# Patient Record
Sex: FEMALE | Race: WHITE | NOT HISPANIC OR LATINO | Employment: OTHER | URBAN - METROPOLITAN AREA
[De-identification: names, ages, dates, MRNs, and addresses within clinical notes are randomized per-mention and may not be internally consistent; named-entity substitution may affect disease eponyms.]

---

## 2019-08-27 ENCOUNTER — TELEPHONE (OUTPATIENT)
Dept: VASCULAR SURGERY | Facility: CLINIC | Age: 69
End: 2019-08-27

## 2019-08-27 NOTE — TELEPHONE ENCOUNTER
Would be happy to see patient again  Unfortunately, without any clinical history or exam, I cannot know what workup she needs without seeing her first   Please schedule her for appt at her convenience and we can try to expedite her workup if need be      Thanks,    Alicia Moss

## 2019-08-27 NOTE — TELEPHONE ENCOUNTER
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  Patient has since relocated to [place] [id number] and was seeing vascular surgeon there who informed her she is going to need some "work" done on the area and possibly "cleaned out"  Even though patient would technically be a new patient now, she is requesting Dr Alex Kilgore look over her chart and advise what should be ordered out in [place] so that she can come here and Dr Alex Kilgore address her problem  Advised her she needs to have all records with vascular surgeon in [place] forwarded to office and to schedule appt here  Please advise if there is anything you suggest the patient have done (imaging) prior to her visit with you

## 2019-09-20 ENCOUNTER — TELEPHONE (OUTPATIENT)
Dept: VASCULAR SURGERY | Facility: CLINIC | Age: 69
End: 2019-09-20

## 2019-09-20 NOTE — TELEPHONE ENCOUNTER
Patient called to request Dr Lou Souza to order bilateral knee Xrays when she has her doppler done  She said she had a bad fall in the shower and she wants her joint replacements to be checked  I told her that it should be done by ortho, but she insisted on me asking dr Lou Souza because she is in [place] presently and she is coming to see dr Luo Souza and have doppler done and wants knees checked at the same time  I told her I would send a message to dr Lou Souza

## 2019-09-24 NOTE — TELEPHONE ENCOUNTER
No, I will not order xrays to evaluate knee replacements as I would not be qualified to evaluate the xrays once complete  Additionally, these could not be done at the same time as the doppler as they are two different imaging modalities  Please tell patient that she should mention any concerns either to ortho or her PCP and if she is still having pain, she should get this evaluated in [place] so as not to delay treatment      Thanks

## 2019-09-26 ENCOUNTER — OFFICE VISIT (OUTPATIENT)
Dept: VASCULAR SURGERY | Facility: CLINIC | Age: 69
End: 2019-09-26
Payer: COMMERCIAL

## 2019-09-26 ENCOUNTER — HOSPITAL ENCOUNTER (OUTPATIENT)
Dept: NON INVASIVE DIAGNOSTICS | Facility: CLINIC | Age: 69
Discharge: HOME | End: 2019-09-26
Payer: COMMERCIAL

## 2019-09-26 VITALS
HEIGHT: 61 IN | HEART RATE: 83 BPM | SYSTOLIC BLOOD PRESSURE: 138 MMHG | BODY MASS INDEX: 40.17 KG/M2 | TEMPERATURE: 98.7 F | DIASTOLIC BLOOD PRESSURE: 90 MMHG | WEIGHT: 212.8 LBS

## 2019-09-26 DIAGNOSIS — I73.9 PAD (PERIPHERAL ARTERY DISEASE) (HCC): Primary | ICD-10-CM

## 2019-09-26 DIAGNOSIS — I72.9 ANEURYSM (HCC): ICD-10-CM

## 2019-09-26 PROBLEM — M47.812 DJD (DEGENERATIVE JOINT DISEASE) OF CERVICAL SPINE: Status: ACTIVE | Noted: 2019-09-26

## 2019-09-26 PROCEDURE — 93926 LOWER EXTREMITY STUDY: CPT | Performed by: SURGERY

## 2019-09-26 PROCEDURE — 99214 OFFICE O/P EST MOD 30 MIN: CPT | Performed by: SURGERY

## 2019-09-26 PROCEDURE — 93922 UPR/L XTREMITY ART 2 LEVELS: CPT | Performed by: SURGERY

## 2019-09-26 PROCEDURE — 93926 LOWER EXTREMITY STUDY: CPT

## 2019-09-26 RX ORDER — ASPIRIN 81 MG/1
81 TABLET ORAL DAILY
COMMUNITY

## 2019-09-26 RX ORDER — ESOMEPRAZOLE MAGNESIUM 40 MG/1
CAPSULE, DELAYED RELEASE ORAL
COMMUNITY

## 2019-09-26 RX ORDER — DULOXETIN HYDROCHLORIDE 30 MG/1
CAPSULE, DELAYED RELEASE ORAL
COMMUNITY

## 2019-09-26 NOTE — PATIENT INSTRUCTIONS
1) Right popliteal stent  -the ultrasound showed that there is a narrowing at the end of the stent  -I think we should do an angiogram to try to enlarge this area and prevent further blockage  -we can do this now or next time you are back in the area  -avoid kneeling for any period of time  -continue your aspirin once daily

## 2019-09-26 NOTE — PROGRESS NOTES
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  She is s/p viabahn stent placement  (11/15)    PAD (peripheral artery disease) ([facility])  -     VAS lower limb arterial duplex, complete bilateral; Future  -s/p R viabahn stent placement for bleeding pseudoaneurysm as complication of R knee replacement  -patient has since moved to [place] to be close to her sons  -reports that she had a procedure there where it sound like the did a RLE agram and "cleaned things out put want to do the same thing to clean it out more"  -reviewed LEADs which report >75% stenosis at the distal stent but ABIs remain wnl  -patient continues to be asymptomatic  -discussed in stent stenosis and risk of stentgraft thrombosis; recommended angiogram to evaluate and treat any stenosis; as patient is only visiting, she does not want to plan procedure now and would like to have this done after the winter  -she will call our office once she knows the schedule for her next visit; we will plan to do a LEADs and then angiogram; instructed her that she should attempt to fly home for a least a week after the procedure, if all goes well and longer if any complications    Medications  -cont ASA and statin    F/U: 6 mos for RLE angiogram    Subjective:      Patient ID: Matt Sosa is a 76 y o  female  Pt is a new patient referred by Dr Nadeen Vizcaino  Pt c/o of Heaviness of the R leg  Pt C/o of cramping in legs and thighs, pt states they are worst in the [place]   Pt is taking potassium and magnesium to help  This has been going on for 2 1/2 years  Pt had R leg agram in [place] about two years ago  Pt [place] TEDS in [place]   Pt has a VAS CASH 09/26/19  HPI:    Patient known to me from '13 when I placed a viabahn stent for bleeding PSA at [facility] after injury from TKA  She returns for first f/u in 2 years    She has since move to [place] to be closer to her twin boys and grandchildren  She reports having a procedure there to work on the right leg where he "opened things up" and that he said he wants to repeat the procedure to "open it up some more"  She denies symptoms including claudication, rest pain, wounds  The following portions of the patient's history were reviewed and updated as appropriate: allergies, current medications, past family history, past medical history, past social history, past surgical history and problem list     Review of Systems   Constitutional: Negative  HENT: Negative  Eyes: Negative  Respiratory: Positive for shortness of breath (with activity)  Cardiovascular: Positive for leg swelling  Gastrointestinal: Negative  Endocrine: Positive for heat intolerance  Genitourinary: Negative  Musculoskeletal: Positive for back pain, gait problem and myalgias (thigh spasms B)  Leg Heaviness B   Skin: Positive for pallor  Allergic/Immunologic: Negative  Neurological: Positive for numbness (R leg (since knee surgery))  Hematological: Negative  Psychiatric/Behavioral: Negative  Objective:      /90 (BP Location: Left arm, Patient Position: Sitting)   Pulse 83   Temp 98 7 °F (37 1 °C)   Ht 5' 1" (1 549 m)   Wt 96 5 kg (212 lb 12 8 oz)   BMI 40 21 kg/m²          Physical Exam   Constitutional: She is oriented to person, place, and time  She appears well-developed and well-nourished  HENT:   Head: Normocephalic and atraumatic  Eyes: Conjunctivae are normal    Neck: Normal range of motion  Neck supple  Cardiovascular: Normal rate, regular rhythm and normal heart sounds  No murmur heard  Pulses:       Radial pulses are 2+ on the right side, and 2+ on the left side  Dorsalis pedis pulses are 2+ on the right side, and 2+ on the left side  Posterior tibial pulses are 2+ on the right side, and 1+ on the left side     No carotid bruits   Pulmonary/Chest: Effort normal and breath sounds normal  No respiratory distress  She has no wheezes  Abdominal: Soft  She exhibits no distension  There is no tenderness  There is no rebound  Musculoskeletal: Normal range of motion  She exhibits edema (mild to moderate leg edema)  Neurological: She is alert and oriented to person, place, and time  Skin: Skin is warm and dry  Psychiatric: She has a normal mood and affect  Her behavior is normal    Nursing note and vitals reviewed  I have reviewed and made appropriate changes to the review of systems input by the medical assistant      Vitals:    09/26/19 1512   BP: 138/90   BP Location: Left arm   Patient Position: Sitting   Pulse: 83   Temp: 98 7 °F (37 1 °C)   Weight: 96 5 kg (212 lb 12 8 oz)   Height: 5' 1" (1 549 m)       Patient Active Problem List   Diagnosis    DJD (degenerative joint disease) of cervical spine    Arthritis    Bilateral edema of lower extremity    [facility]ch[facility] obstructive [facility]) [facility])    PAD (peripheral artery disease) ([facility])       Past Surgical History:   Procedure Laterality Date    LAPAROSCOPIC GASTRIC BANDING      POPLITEAL ARTERY STENT      REPLACEMENT TOTAL KNEE         Family History   Problem Relation Age of Onset    Cancer Mother        Social History     Socioeconomic History    Marital status: /Civil Union     Spouse name: Not on file    Number of children: Not on file    Years of education: Not on file    Highest education level: Not on file   Occupational History    Not on file   Social Needs    Financial resource strain: Not on file    Food insecurity:     Worry: Not on file     Inability: Not on file    Transportation needs:     Medical: Not on file     Non-medical: Not on file   Tobacco Use    Smoking status: Never Smoker    Smokeless tobacco: Never Used   Substance and Sexual Activity    Alcohol use: Never     Frequency: Never    Drug use: Never    Sexual activity: Not on file   Lifestyle    Physical activity: Days per week: Not on file     Minutes per session: Not on file    Stress: Not on file   Relationships    Social connections:     Talks on phone: Not on file     Gets together: Not on file     Attends [employer] service: Not on file     Active member of club or organization: Not on file     Attends meetings of clubs or organizations: Not on file     Relationship status: Not on file    Intimate partner violence:     Fear of current or ex partner: Not on file     Emotionally abused: Not on file     Physically abused: Not on file     Forced sexual activity: Not on file   Other Topics Concern    Not on file   Social History Narrative    Not on file       Allergies   Allergen Reactions    Erythromycin Nausea Only     Nausea      Levofloxacin          Current Outpatient Medications:     aspirin (ECOTRIN LOW STRENGTH) 81 mg EC tablet, Take 81 mg by mouth daily, Disp: , Rfl:     Aspirin-Acetaminophen-Caffeine (EXCEDRIN PO), Take by mouth, Disp: , Rfl:     diazePAM (VALIUM PO), Take by mouth, Disp: , Rfl:     MAGNESIUM PO, Take by mouth, Disp: , Rfl:     POTASSIUM PO, Take by mouth, Disp: , Rfl:     DULoxetine (CYMBALTA) 30 mg delayed release capsule, Take by mouth, Disp: , Rfl:     esomeprazole (NEXIUM) 40 MG capsule, Take by mouth, Disp: , Rfl:

## 2020-05-03 ENCOUNTER — OFFICE VISIT (OUTPATIENT)
Dept: URGENT CARE | Facility: CLINIC | Age: 70
End: 2020-05-03
Payer: COMMERCIAL

## 2020-05-03 VITALS
OXYGEN SATURATION: 95 % | SYSTOLIC BLOOD PRESSURE: 124 MMHG | RESPIRATION RATE: 20 BRPM | WEIGHT: 197 LBS | TEMPERATURE: 98.5 F | HEIGHT: 61 IN | DIASTOLIC BLOOD PRESSURE: 86 MMHG | HEART RATE: 82 BPM | BODY MASS INDEX: 37.19 KG/M2

## 2020-05-03 DIAGNOSIS — J11.1 INFLUENZA: Primary | ICD-10-CM

## 2020-05-03 PROCEDURE — 99213 OFFICE O/P EST LOW 20 MIN: CPT | Performed by: PHYSICIAN ASSISTANT

## 2020-05-03 PROCEDURE — S9083 URGENT CARE CENTER GLOBAL: HCPCS | Performed by: PHYSICIAN ASSISTANT

## 2020-05-03 RX ORDER — OSELTAMIVIR PHOSPHATE 75 MG/1
75 CAPSULE ORAL EVERY 12 HOURS SCHEDULED
Qty: 10 CAPSULE | Refills: 0 | Status: SHIPPED | OUTPATIENT
Start: 2020-05-03 | End: 2020-05-08

## 2020-05-03 RX ORDER — VALSARTAN AND HYDROCHLOROTHIAZIDE 80; 12.5 MG/1; MG/1
1 TABLET, FILM COATED ORAL
COMMUNITY
Start: 2019-04-25 | End: 2021-10-07

## 2020-05-03 RX ORDER — ALPRAZOLAM 0.5 MG/1
TABLET ORAL
COMMUNITY
Start: 2019-04-02 | End: 2022-08-09

## 2020-05-03 RX ORDER — IPRATROPIUM BROMIDE 42 UG/1
2 SPRAY, METERED NASAL 4 TIMES DAILY
COMMUNITY
Start: 2019-11-15 | End: 2020-05-03 | Stop reason: SDUPTHER

## 2020-05-03 RX ORDER — IPRATROPIUM BROMIDE 42 UG/1
2 SPRAY, METERED NASAL 4 TIMES DAILY
Qty: 15 ML | Refills: 0 | Status: SHIPPED | OUTPATIENT
Start: 2020-05-03

## 2020-06-11 ENCOUNTER — HOSPITAL ENCOUNTER (OUTPATIENT)
Dept: NON INVASIVE DIAGNOSTICS | Facility: CLINIC | Age: 70
Discharge: HOME | End: 2020-06-11
Payer: COMMERCIAL

## 2020-06-11 DIAGNOSIS — I73.9 PAD (PERIPHERAL ARTERY DISEASE) (HCC): ICD-10-CM

## 2020-06-11 PROCEDURE — 93925 LOWER EXTREMITY STUDY: CPT

## 2020-06-11 PROCEDURE — 93923 UPR/LXTR ART STDY 3+ LVLS: CPT

## 2020-06-12 ENCOUNTER — TELEPHONE (OUTPATIENT)
Dept: ADMINISTRATIVE | Facility: HOSPITAL | Age: 70
End: 2020-06-12

## 2020-06-12 PROCEDURE — 93925 LOWER EXTREMITY STUDY: CPT | Performed by: SURGERY

## 2020-06-12 PROCEDURE — 93922 UPR/L XTREMITY ART 2 LEVELS: CPT | Performed by: SURGERY

## 2020-06-15 ENCOUNTER — OFFICE VISIT (OUTPATIENT)
Dept: VASCULAR SURGERY | Facility: CLINIC | Age: 70
End: 2020-06-15
Payer: COMMERCIAL

## 2020-06-15 VITALS
TEMPERATURE: 99.1 F | SYSTOLIC BLOOD PRESSURE: 126 MMHG | WEIGHT: 199 LBS | RESPIRATION RATE: 18 BRPM | HEART RATE: 107 BPM | BODY MASS INDEX: 37.57 KG/M2 | DIASTOLIC BLOOD PRESSURE: 84 MMHG | HEIGHT: 61 IN

## 2020-06-15 DIAGNOSIS — I73.9 PAD (PERIPHERAL ARTERY DISEASE) (HCC): Primary | ICD-10-CM

## 2020-06-15 DIAGNOSIS — R60.0 BILATERAL EDEMA OF LOWER EXTREMITY: ICD-10-CM

## 2020-06-15 PROCEDURE — 99213 OFFICE O/P EST LOW 20 MIN: CPT | Performed by: SURGERY

## 2020-06-25 ENCOUNTER — APPOINTMENT (OUTPATIENT)
Dept: LAB | Facility: CLINIC | Age: 70
End: 2020-06-25
Payer: COMMERCIAL

## 2020-06-25 ENCOUNTER — TRANSCRIBE ORDERS (OUTPATIENT)
Dept: LAB | Facility: CLINIC | Age: 70
End: 2020-06-25

## 2020-06-25 DIAGNOSIS — F41.9 ANXIETY AND DEPRESSION: ICD-10-CM

## 2020-06-25 DIAGNOSIS — F32.A ANXIETY AND DEPRESSION: Primary | ICD-10-CM

## 2020-06-25 DIAGNOSIS — M54.42 BILATERAL LOW BACK PAIN WITH BILATERAL SCIATICA, UNSPECIFIED CHRONICITY: ICD-10-CM

## 2020-06-25 DIAGNOSIS — M54.41 BILATERAL LOW BACK PAIN WITH BILATERAL SCIATICA, UNSPECIFIED CHRONICITY: ICD-10-CM

## 2020-06-25 DIAGNOSIS — I10 HYPERTENSION, UNSPECIFIED TYPE: ICD-10-CM

## 2020-06-25 DIAGNOSIS — F32.A ANXIETY AND DEPRESSION: ICD-10-CM

## 2020-06-25 DIAGNOSIS — F41.9 ANXIETY AND DEPRESSION: Primary | ICD-10-CM

## 2020-06-25 LAB
25(OH)D3 SERPL-MCNC: 30.4 NG/ML (ref 30–100)
ALBUMIN SERPL BCP-MCNC: 3.8 G/DL (ref 3.5–5)
ALP SERPL-CCNC: 82 U/L (ref 46–116)
ALT SERPL W P-5'-P-CCNC: 21 U/L (ref 12–78)
ANION GAP SERPL CALCULATED.3IONS-SCNC: 6 MMOL/L (ref 4–13)
AST SERPL W P-5'-P-CCNC: 19 U/L (ref 5–45)
BASOPHILS # BLD AUTO: 0.05 THOUSANDS/ΜL (ref 0–0.1)
BASOPHILS NFR BLD AUTO: 1 % (ref 0–1)
BILIRUB SERPL-MCNC: 0.66 MG/DL (ref 0.2–1)
BUN SERPL-MCNC: 18 MG/DL (ref 5–25)
CALCIUM SERPL-MCNC: 9.5 MG/DL (ref 8.3–10.1)
CHLORIDE SERPL-SCNC: 104 MMOL/L (ref 100–108)
CHOLEST SERPL-MCNC: 294 MG/DL (ref 50–200)
CO2 SERPL-SCNC: 30 MMOL/L (ref 21–32)
CREAT SERPL-MCNC: 0.8 MG/DL (ref 0.6–1.3)
EOSINOPHIL # BLD AUTO: 0.08 THOUSAND/ΜL (ref 0–0.61)
EOSINOPHIL NFR BLD AUTO: 1 % (ref 0–6)
ERYTHROCYTE [DISTWIDTH] IN BLOOD BY AUTOMATED COUNT: 14.1 % (ref 11.6–15.1)
ERYTHROCYTE [SEDIMENTATION RATE] IN BLOOD: 35 MM/HOUR (ref 0–20)
GFR SERPL CREATININE-BSD FRML MDRD: 75 ML/MIN/1.73SQ M
GLUCOSE P FAST SERPL-MCNC: 98 MG/DL (ref 65–99)
HCT VFR BLD AUTO: 40.4 % (ref 34.8–46.1)
HDLC SERPL-MCNC: 94 MG/DL
HGB BLD-MCNC: 12.8 G/DL (ref 11.5–15.4)
IMM GRANULOCYTES # BLD AUTO: 0.02 THOUSAND/UL (ref 0–0.2)
IMM GRANULOCYTES NFR BLD AUTO: 0 % (ref 0–2)
LDLC SERPL CALC-MCNC: 178 MG/DL (ref 0–100)
LYMPHOCYTES # BLD AUTO: 0.98 THOUSANDS/ΜL (ref 0.6–4.47)
LYMPHOCYTES NFR BLD AUTO: 13 % (ref 14–44)
MAGNESIUM SERPL-MCNC: 1.9 MG/DL (ref 1.6–2.6)
MCH RBC QN AUTO: 28.9 PG (ref 26.8–34.3)
MCHC RBC AUTO-ENTMCNC: 31.7 G/DL (ref 31.4–37.4)
MCV RBC AUTO: 91 FL (ref 82–98)
MONOCYTES # BLD AUTO: 0.35 THOUSAND/ΜL (ref 0.17–1.22)
MONOCYTES NFR BLD AUTO: 5 % (ref 4–12)
NEUTROPHILS # BLD AUTO: 5.84 THOUSANDS/ΜL (ref 1.85–7.62)
NEUTS SEG NFR BLD AUTO: 80 % (ref 43–75)
NONHDLC SERPL-MCNC: 200 MG/DL
NRBC BLD AUTO-RTO: 0 /100 WBCS
NT-PROBNP SERPL-MCNC: 63 PG/ML
PLATELET # BLD AUTO: 328 THOUSANDS/UL (ref 149–390)
PMV BLD AUTO: 8.6 FL (ref 8.9–12.7)
POTASSIUM SERPL-SCNC: 3.7 MMOL/L (ref 3.5–5.3)
PROT SERPL-MCNC: 7.6 G/DL (ref 6.4–8.2)
RBC # BLD AUTO: 4.43 MILLION/UL (ref 3.81–5.12)
SODIUM SERPL-SCNC: 140 MMOL/L (ref 136–145)
TRIGL SERPL-MCNC: 108 MG/DL
TSH SERPL DL<=0.05 MIU/L-ACNC: 0.98 UIU/ML (ref 0.36–3.74)
VIT B12 SERPL-MCNC: 500 PG/ML (ref 100–900)
WBC # BLD AUTO: 7.32 THOUSAND/UL (ref 4.31–10.16)

## 2020-06-25 PROCEDURE — 83880 ASSAY OF NATRIURETIC PEPTIDE: CPT

## 2020-06-25 PROCEDURE — 80061 LIPID PANEL: CPT

## 2020-06-25 PROCEDURE — 83735 ASSAY OF MAGNESIUM: CPT

## 2020-06-25 PROCEDURE — 85025 COMPLETE CBC W/AUTO DIFF WBC: CPT

## 2020-06-25 PROCEDURE — 84443 ASSAY THYROID STIM HORMONE: CPT

## 2020-06-25 PROCEDURE — 82607 VITAMIN B-12: CPT

## 2020-06-25 PROCEDURE — 82306 VITAMIN D 25 HYDROXY: CPT

## 2020-06-25 PROCEDURE — 36415 COLL VENOUS BLD VENIPUNCTURE: CPT

## 2020-06-25 PROCEDURE — 80053 COMPREHEN METABOLIC PANEL: CPT

## 2020-06-25 PROCEDURE — 85652 RBC SED RATE AUTOMATED: CPT

## 2021-02-13 DIAGNOSIS — Z23 ENCOUNTER FOR IMMUNIZATION: ICD-10-CM

## 2021-03-22 ENCOUNTER — OFFICE VISIT (OUTPATIENT)
Dept: URGENT CARE | Facility: CLINIC | Age: 71
End: 2021-03-22
Payer: COMMERCIAL

## 2021-03-22 VITALS
SYSTOLIC BLOOD PRESSURE: 130 MMHG | TEMPERATURE: 97.4 F | OXYGEN SATURATION: 97 % | HEIGHT: 61 IN | WEIGHT: 220 LBS | BODY MASS INDEX: 41.54 KG/M2 | RESPIRATION RATE: 16 BRPM | HEART RATE: 80 BPM | DIASTOLIC BLOOD PRESSURE: 92 MMHG

## 2021-03-22 DIAGNOSIS — N30.01 ACUTE CYSTITIS WITH HEMATURIA: Primary | ICD-10-CM

## 2021-03-22 LAB
SL AMB  POCT GLUCOSE, UA: ABNORMAL
SL AMB LEUKOCYTE ESTERASE,UA: ABNORMAL
SL AMB POCT BILIRUBIN,UA: ABNORMAL
SL AMB POCT BLOOD,UA: ABNORMAL
SL AMB POCT CLARITY,UA: ABNORMAL
SL AMB POCT COLOR,UA: ABNORMAL
SL AMB POCT KETONES,UA: ABNORMAL
SL AMB POCT NITRITE,UA: ABNORMAL
SL AMB POCT PH,UA: 6
SL AMB POCT SPECIFIC GRAVITY,UA: 1.02
SL AMB POCT URINE PROTEIN: 30
SL AMB POCT UROBILINOGEN: 0.2

## 2021-03-22 PROCEDURE — 87086 URINE CULTURE/COLONY COUNT: CPT | Performed by: PHYSICIAN ASSISTANT

## 2021-03-22 PROCEDURE — 99213 OFFICE O/P EST LOW 20 MIN: CPT | Performed by: PHYSICIAN ASSISTANT

## 2021-03-22 PROCEDURE — 87186 SC STD MICRODIL/AGAR DIL: CPT | Performed by: PHYSICIAN ASSISTANT

## 2021-03-22 PROCEDURE — 81002 URINALYSIS NONAUTO W/O SCOPE: CPT | Performed by: PHYSICIAN ASSISTANT

## 2021-03-22 PROCEDURE — 87077 CULTURE AEROBIC IDENTIFY: CPT | Performed by: PHYSICIAN ASSISTANT

## 2021-03-22 PROCEDURE — S9083 URGENT CARE CENTER GLOBAL: HCPCS | Performed by: PHYSICIAN ASSISTANT

## 2021-03-22 RX ORDER — NITROFURANTOIN 25; 75 MG/1; MG/1
100 CAPSULE ORAL 2 TIMES DAILY
Qty: 14 CAPSULE | Refills: 1 | Status: SHIPPED | OUTPATIENT
Start: 2021-03-22 | End: 2021-03-29

## 2021-03-22 NOTE — PATIENT INSTRUCTIONS
Urine dip in the office today was positive for signs of UTI  Begin taking  Antibiotic as directed  Take with probiotic or yogurt to prevent stomach upset  Continue to stay very well hydrated  Follow up with PCP in 3-5 days for continued symptoms  Proceed to the ER with any worsening of symptoms, flank pain, decreased urination, fever or chills  Dysuria   WHAT YOU NEED TO KNOW:   Dysuria is difficulty urinating, or pain, burning, or discomfort with urination  Dysuria is usually a symptom of another problem  DISCHARGE INSTRUCTIONS:   Return to the emergency department if:   · You have severe back, side, or abdominal pain  · You have fever and shaking chills  · You vomit several times in a row  Contact your healthcare provider if:   · Your symptoms do not go away, even after treatment  · You have questions or concerns about your condition or care  Medicines:   · Medicines  may be given to help treat a bacterial infection or help decrease bladder spasms  · Take your medicine as directed  Contact your healthcare provider if you think your medicine is not helping or if you have side effects  Tell him of her if you are allergic to any medicine  Keep a list of the medicines, vitamins, and herbs you take  Include the amounts, and when and why you take them  Bring the list or the pill bottles to follow-up visits  Carry your medicine list with you in case of an emergency  Follow up with your healthcare provider as directed: Your healthcare provider may also refer you to a urologist or nephrologist to have additional testing  Write down your questions so you remember to ask them during your visits  Manage your dysuria:   · Drink more liquids  Liquids help flush out bacteria that may be causing an infection  Ask your healthcare provider how much liquid to drink each day and which liquids are best for you  · Take sitz baths as directed  Fill a bathtub with 4 to 6 inches of warm water  You may also use a sitz bath pan that fits over a toilet  Sit in the sitz bath for 20 minutes  Do this 2 to 3 times a day, or as directed  The warm water can help decrease pain and swelling  © Copyright [facility] [address] Information is for End User's use only and may not be sold, redistributed or otherwise used for commercial purposes  All illustrations and images included in CareNotes® are the copyrighted property of [employer]  or [facility] Naima Sung   The above information is an  only  It is not intended as medical advice for individual conditions or treatments  Talk to your doctor, nurse or pharmacist before following any medical regimen to see if it is safe and effective for you

## 2021-03-22 NOTE — PROGRESS NOTES
[facility]        NAME: Leah Bejarano is a 79 y o  female  : 1950    MRN: [id number]  DATE: 2021  TIME: 10:31 AM    Assessment and Plan   Acute cystitis with hematuria [N30 01]  1  Acute cystitis with hematuria  POCT urine dip    Urine culture    nitrofurantoin (MACROBID) 100 mg capsule         Patient Instructions     Urine dip in the office today was positive for signs of UTI  Begin taking  Antibiotic as directed  Take with probiotic or yogurt to prevent stomach upset  Continue to stay very well hydrated  Follow up with PCP in 3-5 days for continued symptoms  Proceed to the ER with any worsening of symptoms, flank pain, decreased urination, fever or chills  Follow up with PCP in 3-5 days  Proceed to  ER if symptoms worsen  Chief Complaint     Chief Complaint   Patient presents with    Possible UTI     About one week ago patient started with blood in urine  She the complains of burning while urinating  She tried Azo but it did not help  History of Present Illness       79year old female presents to the office for c/o of hematuria x 1 episode 4 days ago as well as dysuria for the last few days  The patient notes that she is prone to UTIs and that this feels similar  She wears a pad and noted a small amount of blood 4 days ago  No episodes since  She has had burning with urination on every urination for the last several days  She has been taking Azo OTC without much relief  She has also tried to stay hydrated  She dneies any fever, chills, flank pain  No changes in BM, blood in stool  She is eating and drinking normally  He last UTI was in the fall of last year  Urinary Tract Infection   This is a new problem  The current episode started in the past 7 days  The problem occurs every urination  The problem has been unchanged  The quality of the pain is described as burning  There has been no fever   Associated symptoms include frequency, hematuria, hesitancy and urgency  Pertinent negatives include no chills, flank pain, nausea or vomiting  She has tried increased fluids (AZO) for the symptoms  The treatment provided no relief  Her past medical history is significant for recurrent UTIs  There is no history of a single kidney or a urological procedure  Review of Systems   Review of Systems   Constitutional: Negative for appetite change, chills, fatigue and fever  Gastrointestinal: Negative for nausea and vomiting  Genitourinary: Positive for difficulty urinating, dysuria, frequency, hematuria, hesitancy and urgency  Negative for decreased urine volume and flank pain  Neurological: Negative for dizziness and headaches           Current Medications       Current Outpatient Medications:     ALPRAZolam (XANAX) 0 5 mg tablet, TAKE 1 TABLET BY MOUTH EVERY DAY AS NEEDED FOR SEVERE ANXIETY, Disp: , Rfl:     aspirin (ECOTRIN LOW STRENGTH) 81 mg EC tablet, Take 81 mg by mouth daily, Disp: , Rfl:     Aspirin-Acetaminophen-Caffeine (EXCEDRIN PO), Take by mouth, Disp: , Rfl:     diazePAM (VALIUM PO), Take by mouth, Disp: , Rfl:     DULoxetine (CYMBALTA) 30 mg delayed release capsule, Take by mouth, Disp: , Rfl:     esomeprazole (NEXIUM) 40 MG capsule, Take by mouth, Disp: , Rfl:     ipratropium (ATROVENT) 0 06 % nasal spray, 2 sprays into each nostril 4 (four) times a day, Disp: 15 mL, Rfl: 0    MAGNESIUM PO, Take by mouth, Disp: , Rfl:     POTASSIUM PO, Take by mouth, Disp: , Rfl:     valsartan-hydrochlorothiazide (DIOVAN-HCT) 80-12 5 MG per tablet, 1 tablet, Disp: , Rfl:     nitrofurantoin (MACROBID) 100 mg capsule, Take 1 capsule (100 mg total) by mouth 2 (two) times a day for 7 days, Disp: 14 capsule, Rfl: 1    Current Allergies     Allergies as of 03/22/2021 - Reviewed 03/22/2021   Allergen Reaction Noted    Erythromycin Nausea Only 06/16/2017    Levofloxacin  01/25/2016    Codeine Other (See Comments) 04/19/2018            The following portions of the patient's history were reviewed and updated as appropriate: allergies, current medications, past family history, past medical history, past social history, past surgical history and problem list      Past Medical History:   Diagnosis Date    High blood pressure        Past Surgical History:   Procedure Laterality Date    GASTROSTOMY      LAPAROSCOPIC GASTRIC BANDING      POPLITEAL ARTERY STENT      REPLACEMENT TOTAL KNEE      TONSILLECTOMY         Family History   Problem Relation Age of Onset    Cancer Mother          Medications have been verified  Objective   /92   Pulse 80   Temp (!) 97 4 °F (36 3 °C)   Resp 16   Ht 5' 1" (1 549 m)   Wt 99 8 kg (220 lb)   SpO2 97%   BMI 41 57 kg/m²   No LMP recorded  Physical Exam     Physical Exam  Vitals signs and nursing note reviewed  Constitutional:       General: She is not in acute distress  Appearance: Normal appearance  She is well-developed  She is not diaphoretic  Comments: Pleasant female in no acute distress    HENT:      Head: Normocephalic and atraumatic  Right Ear: Hearing, tympanic membrane, ear canal and external ear normal       Left Ear: Hearing, tympanic membrane, ear canal and external ear normal       Nose: No mucosal edema or rhinorrhea  Mouth/Throat:      Lips: Pink  Mouth: Mucous membranes are moist       Dentition: No dental caries  Pharynx: Uvula midline  No oropharyngeal exudate, posterior oropharyngeal erythema or uvula swelling  Tonsils: No tonsillar exudate or tonsillar abscesses  Comments:  Mucous membranes moist  Eyes:      General: Lids are normal  No scleral icterus  Right eye: No discharge  Left eye: No discharge  Conjunctiva/sclera: Conjunctivae normal       Pupils: Pupils are equal, round, and reactive to light  Cardiovascular:      Rate and Rhythm: Normal rate and regular rhythm        Heart sounds: Normal heart sounds, S1 normal and S2 normal  No murmur  No S3 or S4 sounds  Pulmonary:      Effort: Pulmonary effort is normal  No respiratory distress  Breath sounds: Normal breath sounds  No stridor  No wheezing, rhonchi or rales  Abdominal:      General: Bowel sounds are normal  There is no distension  Palpations: Abdomen is soft  Abdomen is not rigid  Tenderness: There is no abdominal tenderness  There is no right CVA tenderness, left CVA tenderness or guarding  Lymphadenopathy:      Cervical: No cervical adenopathy  Skin:     General: Skin is warm and dry  Coloration: Skin is not pale  Findings: No rash  Neurological:      Mental Status: She is alert  Psychiatric:         Behavior: Behavior is cooperative

## 2021-03-25 LAB — BACTERIA UR CULT: ABNORMAL

## 2021-04-27 ENCOUNTER — HOSPITAL ENCOUNTER (OUTPATIENT)
Dept: NON INVASIVE DIAGNOSTICS | Facility: CLINIC | Age: 71
Discharge: HOME | End: 2021-04-27
Payer: COMMERCIAL

## 2021-04-27 DIAGNOSIS — I73.9 PAD (PERIPHERAL ARTERY DISEASE) (HCC): ICD-10-CM

## 2021-04-27 PROCEDURE — 93925 LOWER EXTREMITY STUDY: CPT

## 2021-04-27 PROCEDURE — 93925 LOWER EXTREMITY STUDY: CPT | Performed by: SURGERY

## 2021-04-27 PROCEDURE — 93923 UPR/LXTR ART STDY 3+ LVLS: CPT

## 2021-04-27 PROCEDURE — 93922 UPR/L XTREMITY ART 2 LEVELS: CPT | Performed by: SURGERY

## 2021-05-07 ENCOUNTER — OFFICE VISIT (OUTPATIENT)
Dept: VASCULAR SURGERY | Facility: CLINIC | Age: 71
End: 2021-05-07
Payer: COMMERCIAL

## 2021-05-07 VITALS
WEIGHT: 220 LBS | TEMPERATURE: 98.8 F | HEART RATE: 86 BPM | HEIGHT: 61 IN | SYSTOLIC BLOOD PRESSURE: 130 MMHG | DIASTOLIC BLOOD PRESSURE: 86 MMHG | BODY MASS INDEX: 41.54 KG/M2

## 2021-05-07 DIAGNOSIS — I73.9 PAD (PERIPHERAL ARTERY DISEASE) (HCC): Primary | ICD-10-CM

## 2021-05-07 DIAGNOSIS — R60.0 BILATERAL EDEMA OF LOWER EXTREMITY: ICD-10-CM

## 2021-05-07 PROCEDURE — 99214 OFFICE O/P EST MOD 30 MIN: CPT | Performed by: PHYSICIAN ASSISTANT

## 2021-05-07 RX ORDER — VALSARTAN 80 MG/1
80 TABLET ORAL DAILY
COMMUNITY
Start: 2020-09-23 | End: 2021-09-23

## 2021-05-07 RX ORDER — ALBUTEROL SULFATE 90 UG/1
AEROSOL, METERED RESPIRATORY (INHALATION)
COMMUNITY
Start: 2020-12-11 | End: 2022-08-09

## 2021-05-07 NOTE — PROGRESS NOTES
Assessment/Plan:    PAD (peripheral artery disease) ([address] )  79year old female nonsmoker w/ COPD, OA, DJD, venous insufficiency, hx of bleeding PSA after R knee replacement s/p viabahn stent placement by Dr Antwan Alfaro Doctor in 2015, presenting for review of recent CASH    CASH 4/27/21  -Rt: known >50% stenosis of the distal popliteal stent, ELBA 1 11/144/102  -Lt: No evidence of arterial occlusive disease  ELBA 1 24/119/109    -Patient presents for follow up after previous viabahn stenting for bleeding PSA following R knee replacement in 2015  -CASH reviewed with patient  There is a known >50% stenosis of the distal R pop stent  This has remained stable and unchanged  -Patient remains asymptomatic  She denies claudication, rest pain or wounds  Palpable pedal pulses on exam  -No surgical/endovascular intervention warranted at this time  -Recommend continuation of ASA  -Will continue routine surveillance with CASH in one year with follow up at that time  -Advised patient to call the office with new symptoms consistent with claudication or rest pain  Patient vocalized understanding     Bilateral edema of lower extremity  -Bilateral lower extremity edema likely secondary to venous insuffiencey   -Patient notices increased swelling in the lower extremities at night  -Recommend compression stockings, lower extremity elevation, exercise and weight loss  -Prescription given for compression stockings       Diagnoses and all orders for this visit:    PAD (peripheral artery disease) ([facility])  -     VAS lower limb arterial duplex, complete bilateral; Future  -     Compression Stocking    Bilateral edema of lower extremity    Other orders  -     albuterol (PROVENTIL HFA,VENTOLIN HFA) 90 mcg/act inhaler; USE 2 INHALATIONS BY MOUTH  SPACED 60 SECONDS APART,  EVERY 4 TO 6 HOURS  -     valsartan (DIOVAN) 80 mg tablet; Take 80 mg by mouth daily          Subjective:      Patient ID: Chema Mishra is a 79 y o  female      Patient is here to rev CASH done on 4/27/21  Patient is having R leg swelling  Patient is taking ASA 81mg  79year old female with hx of R knee replacement complicated by bleeding PSA s/p repair presents for regular follow up and review of routine CASH for surveillance  Patient has overall been doing well  Her imaging notes patent R popliteal viabahn stent with 50% stenosis distally  This has been present on previous imaging  Patient denies claudication, rest pain or wounds  She is able to ambulate without difficulty  She notes some bilateral lower extremity edema which is relieved with compression stockings  The following portions of the patient's history were reviewed and updated as appropriate: allergies, current medications, past family history, past medical history, past social history, past surgical history and problem list     Review of Systems   Constitutional: Negative  HENT: Negative  Eyes: Negative  Respiratory: Negative  Cardiovascular: Positive for leg swelling (RLE)  Gastrointestinal: Negative  Endocrine: Negative  Genitourinary: Negative  Musculoskeletal: Negative  Skin: Negative  Allergic/Immunologic: Negative  Neurological: Negative  Hematological: Negative  Psychiatric/Behavioral: Negative  I have reviewed and made appropriate changes to the review of systems input by the medical assistant      Vitals:    05/07/21 1308   BP: 130/86   BP Location: Right arm   Patient Position: Sitting   Cuff Size: Standard   Pulse: 86   Temp: 98 8 °F (37 1 °C)   TempSrc: Tympanic   Weight: 99 8 kg (220 lb)   Height: 5' 1" (1 549 m)       Patient Active Problem List   Diagnosis    DJD (degenerative joint disease) of cervical spine    Arthritis    Bilateral edema of lower extremity    COPD (chronic obstructive pulmonary disease) ([facility]    PAD (peripheral artery disease) ([facility])       Past Surgical History:   Procedure Laterality Date    GASTROSTOMY      LAPAROSCOPIC GASTRIC BANDING  POPLITEAL ARTERY STENT      REPLACEMENT TOTAL KNEE      TONSILLECTOMY         Family History   Problem Relation Age of Onset    Cancer Mother        Social History     Socioeconomic History    Marital status: /Civil Union     Spouse name: Not on file    Number of children: Not on file    Years of education: Not on file    Highest education level: Not on file   Occupational History    Not on file   Social Needs    Financial resource strain: Not on file    Food insecurity     Worry: Not on file     Inability: Not on file   [employer] needs     Medical: Not on file     Non-medical: Not on file   Tobacco Use    Smoking status: Never Smoker    Smokeless tobacco: Never Used   Substance and Sexual Activity    Alcohol use: Never     Frequency: Never    Drug use: Never    Sexual activity: Not on file   Lifestyle    Physical activity     Days per week: Not on file     Minutes per session: Not on file    Stress: Not on file   Relationships    Social connections     Talks on phone: Not on file     Gets together: Not on file     Attends Rastafarian service: Not on file     Active member of club or organization: Not on file     Attends meetings of clubs or organizations: Not on file     Relationship status: Not on file    Intimate partner violence     Fear of current or ex partner: Not on file     Emotionally abused: Not on file     Physically abused: Not on file     Forced sexual activity: Not on file   Other Topics Concern    Not on file   Social History Narrative    Not on file       Allergies   Allergen Reactions    Erythromycin Nausea Only     Nausea      Levofloxacin     Codeine Other (See Comments)     RINGING IN EARS AND DIFFICULTY SLEEPING         Current Outpatient Medications:     albuterol (PROVENTIL HFA,VENTOLIN HFA) 90 mcg/act inhaler, USE 2 INHALATIONS BY MOUTH  SPACED 60 SECONDS APART,  EVERY 4 TO 6 HOURS, Disp: , Rfl:     ALPRAZolam (XANAX) 0 5 mg tablet, TAKE 1 TABLET BY MOUTH EVERY DAY AS NEEDED FOR SEVERE ANXIETY, Disp: , Rfl:     aspirin (ECOTRIN LOW STRENGTH) 81 mg EC tablet, Take 81 mg by mouth daily, Disp: , Rfl:     Aspirin-Acetaminophen-Caffeine (EXCEDRIN PO), Take by mouth, Disp: , Rfl:     diazePAM (VALIUM PO), Take by mouth, Disp: , Rfl:     DULoxetine (CYMBALTA) 30 mg delayed release capsule, Take by mouth, Disp: , Rfl:     esomeprazole (NEXIUM) 40 MG capsule, Take by mouth, Disp: , Rfl:     MAGNESIUM PO, Take by mouth, Disp: , Rfl:     POTASSIUM PO, Take by mouth, Disp: , Rfl:     valsartan (DIOVAN) 80 mg tablet, Take 80 mg by mouth daily, Disp: , Rfl:     valsartan-hydrochlorothiazide (DIOVAN-HCT) 80-12 5 MG per tablet, 1 tablet, Disp: , Rfl:     ipratropium (ATROVENT) 0 06 % nasal spray, 2 sprays into each nostril 4 (four) times a day (Patient not taking: Reported on 5/7/2021), Disp: 15 mL, Rfl: 0    Objective:      /86 (BP Location: Right arm, Patient Position: Sitting, Cuff Size: Standard)   Pulse 86   Temp 98 8 °F (37 1 °C) (Tympanic)   Ht 5' 1" (1 549 m)   Wt 99 8 kg (220 lb)   BMI 41 57 kg/m²          Physical Exam  Constitutional:       General: She is not in acute distress  Appearance: Normal appearance  She is not ill-appearing, toxic-appearing or diaphoretic  HENT:      Head: Normocephalic and atraumatic  Right Ear: External ear normal       Left Ear: External ear normal       Nose: Nose normal       Mouth/Throat:      Mouth: Mucous membranes are moist       Pharynx: Oropharynx is clear  Eyes:      General: No scleral icterus  Extraocular Movements: Extraocular movements intact  Conjunctiva/sclera: Conjunctivae normal    Neck:      Musculoskeletal: Normal range of motion and neck supple  Cardiovascular:      Rate and Rhythm: Normal rate and regular rhythm  Pulses:           Radial pulses are 2+ on the right side and 2+ on the left side          Dorsalis pedis pulses are 2+ on the right side and 2+ on the left side       Heart sounds: Normal heart sounds  Pulmonary:      Effort: Pulmonary effort is normal  No respiratory distress  Breath sounds: Normal breath sounds  Abdominal:      General: Abdomen is flat  Palpations: Abdomen is soft  Tenderness: There is no abdominal tenderness  Musculoskeletal: Normal range of motion  Skin:     General: Skin is warm and dry  Neurological:      General: No focal deficit present  Mental Status: She is alert and oriented to person, place, and time  Mental status is at baseline  Cranial Nerves: No cranial nerve deficit  Sensory: No sensory deficit  Motor: No weakness     Psychiatric:         Mood and Affect: Mood normal          Behavior: Behavior normal

## 2021-05-07 NOTE — ASSESSMENT & PLAN NOTE
-Bilateral lower extremity edema likely secondary to venous insuffiencey   -Patient notices increased swelling in the lower extremities at night  -Recommend compression stockings, lower extremity elevation, exercise and weight loss  -Prescription given for compression stockings

## 2021-05-07 NOTE — ASSESSMENT & PLAN NOTE
79year old female nonsmoker w/ COPD, OA, DJD, venous insufficiency, hx of bleeding PSA after R knee replacement s/p viabahn stent placement by Dr Danisha Dela Cruz Doctor in 2015, presenting for review of recent CASH    CASH 4/27/21  -Rt: known >50% stenosis of the distal popliteal stent, ELBA 1 11/144/102  -Lt: No evidence of arterial occlusive disease  ELBA 1 24/119/109    -Patient presents for follow up after previous viabahn stenting for bleeding PSA following R knee replacement in 2015  -CASH reviewed with patient  There is a known >50% stenosis of the distal R pop stent  This has remained stable and unchanged  -Patient remains asymptomatic  She denies claudication, rest pain or wounds  Palpable pedal pulses on exam  -No surgical/endovascular intervention warranted at this time  -Recommend continuation of ASA  -Will continue routine surveillance with CASH in one year with follow up at that time  -Advised patient to call the office with new symptoms consistent with claudication or rest pain   Patient vocalized understanding

## 2021-05-07 NOTE — PATIENT INSTRUCTIONS
Venous Insufficiency   AMBULATORY CARE:   Venous insufficiency  is a condition that prevents blood from flowing out of your legs and back to your heart  Veins contain valves that help blood flow in one direction  Venous insufficiency means the valves do not close correctly or fully  Blood flows back and pools in your leg  This can cause problems such as varicose veins  Venous insufficiency may also be called chronic venous insufficiency or venous stasis  Common signs and symptoms:   · Visible veins on your legs that may be small and red or large, thick, and blue    · Swelling in your ankles or calves    · Changes in skin color, such as dark or purple skin    · An ulcer (open sore) on your leg    · Leg pain that is worse when you are menstruating (women) or when you stand, and better when you elevate your legs    · Burning or itching    · Cramps that happen at night    · Thick, hard skin on your legs and ankles    · Feeling of heaviness in your legs    Seek care immediately if:   · You have a wound that does not heal or is infected  · You have an injury that has broken your skin and caused your varicose veins to bleed  · Your leg is swollen and hard  · You have pain in your leg that does not go away or gets worse  · Your legs or feet are turning blue or black  · Your leg feels warm, tender, and painful  It may look swollen and red  Contact your healthcare provider if:   · You have a fever  · You have varicose veins and they are painful  · You have new or worsening leg pain, swelling, or redness  · You have new or worsening ulcers or other sores on your leg  · You have questions or concerns about your condition or care  Treatment  may include any of the following:  · Medicine  may be given to improve blood flow  The medicines may thin your blood or reduce swelling to help blood flow  You may also need medicine to treat a bacterial infection      · Ablation  is a procedure used to close varicose veins  A catheter is guided until it is near the vein  A device will then be guided to the area  The device may produce energy through radiofrequency or a laser  The energy creates heat that will close the blood vessel  · Sclerotherapy  is a procedure used to fade visible veins  Your healthcare provider will inject a liquid into a spider vein or varicose vein  The liquid causes irritation in the vein  The vein swells and sticks together  Your body will then absorb the vein  · Surgery  may be needed if other treatments do not work  Surgery may be used to repair a leg vein valve or to clip or tie off a vein so blood cannot flow through it  You may need to have a veins removed during surgery called stripping  Surgery may be used to bypass (go around) the damaged vein  Blood will flow through a vein transplanted from another part of your body  Manage your symptoms:   · Wear pressure stockings as directed  Pressure stockings help keep blood from pooling in your leg veins  Your healthcare provider can prescribe stockings that are right for you  Do not buy over-the-counter pressure stockings unless your healthcare provider says it is okay  They may not fit correctly or may have elastic that cuts off your circulation  Ask your healthcare provider when to start wearing pressure stockings and how long to wear them each day  · Do not sit or stand for long periods of time  If you have to sit for a long time, flex and extend your legs, feet, and ankles  Do this about 10 times every 30 minutes to help keep blood flowing  If you have to stand for a long time, take breaks and sit with your legs elevated  · Elevate your legs  Elevate your legs above the level of your heart to reduce swelling  Your healthcare provider may recommend that you keep your legs elevated for 30 minutes at a time  You may need to do this 3 to 4 times per day, or more if your healthcare provider recommends  · Do not smoke  Nicotine and other chemicals in cigarettes and cigars can cause blood vessel damage  Ask your healthcare provider for information if you currently smoke and need help to quit  E-cigarettes or smokeless tobacco still contain nicotine  Talk to your healthcare provider before you use these products  · Reach or maintain a healthy weight  Extra weight can make venous insufficiency worse  Ask your healthcare provider how much you should weigh  He can help you create a weight loss plan if you need to lose weight  · Exercise as directed  Walking can help increase blood flow in your calves  Ask your healthcare provider how much exercise you need each day and which exercises are best for you  · Care for your skin  Keep your skin clean  Do not use any soaps or lotions that may dry your skin  For example, do not use products that contain fragrance or alcohol  If you have a skin ulcer, your healthcare provider may recommend a wet-to-dry bandage  To do this, apply a wet bandage to your wound and allow it to dry  This will help remove drainage from your wound each time you change the bandage  Your healthcare provider will tell you how often to change your bandage and which kind of bandage to use  Check your wound for signs of infection, such as swelling or pus  · Go to physical therapy (PT) as directed  A physical therapist can help you increase movement and range of motion in your legs  Follow up with your healthcare provider as directed:  Write down your questions so you remember to ask them during your visits  © Copyright [address] Information is for End User's use only and may not be sold, redistributed or otherwise used for commercial purposes  All illustrations and images included in CareNotes® are the copyrighted property of [employer]  or [facility] Naima Sung   The above information is an  only  It is not intended as medical advice for individual conditions or treatments   Talk to your doctor, nurse or pharmacist before following any medical regimen to see if it is safe and effective for you  Peripheral Artery Disease   WHAT YOU NEED TO KNOW:   What is peripheral artery disease? Peripheral artery disease (PAD) is narrow, weak, or blocked arteries  It may affect any arteries outside of your heart and brain  PAD is usually the result of a buildup of fat and cholesterol, also called plaque, along your artery walls  Inflammation, a blood clot, or abnormal cell growth could also block your arteries  PAD prevents normal blood flow to your legs and arms  You are at risk of an amputation if poor blood flow keeps wounds from healing or causes gangrene (tissue death)  Without treatment, PAD can also cause a heart attack or stroke  What increases my risk for PAD? · Smoking cigarettes     · Diabetes     · High blood pressure     · High cholesterol     · Age older than 40 years    · Heart disease or a family history of heart disease    What are the signs and symptoms of PAD? Mild PAD usually does not cause symptoms  As the disease worsens over time, you may have the following:  · Pain or cramps in your leg or hip while you walk     · A numb, weak, or heavy feeling in your legs     · Dry, scaly, red, or pale skin on your legs     · Thick or brittle nails, or hair loss on your arms and legs     · Foot sores that will not heal     · Burning or aching in your feet and toes while resting (this may be worse when you lie down)    How is PAD diagnosed? · Angiography  is a test that shows pictures of the arteries in your arms and legs  You will be given contrast liquid to help the arteries show up better on the pictures  The pictures will be taken with an MRI or CT scan  Tell the healthcare provider if you have ever had an allergic reaction to contrast liquid  Do not enter the MRI room with anything metal  Metal can cause serious injury   Tell a healthcare provider if you have any metal in or on your body     · Doppler ankle brachial index (ELBA)  is a test that compares blood pressure in your ankles to blood pressure in your arms  This tells your healthcare provider how well blood is flowing through the arteries in your legs  How is PAD treated? Treatment can help reduce your risk of a heart attack, stroke, or amputation  You may need more than one of the following:  · Medicines  may be given  Your healthcare provider may give you any of the following:     ? Antiplatelet medicine,  such as aspirin, helps prevent blood clots and reduces the risk of a heart attack or stroke  ? Statin medicine  helps lower your cholesterol and prevents your PAD from getting worse  · A supervised exercise program  helps you stay active in normal daily activities and may prevent disability  Healthcare providers will help you safely walk or do strength training exercises 3 times a week for 30 to 60 minutes  You will do this for several months, then transition to walking on your own  · Angioplasty  is a procedure to open your artery so blood can flow through normally  A thin tube called a catheter is used to insert a small balloon into your artery  The balloon is inflated to open your blocked artery, and then removed  A tube called a stent may be placed in your artery to hold it open  · Bypass surgery  is used to make a new connection to your artery with a vein from another part of your body, or an artificial graft  The vein or graft is attached to your artery above and below your blockage  This allows blood to flow around the blocked portion of your artery  How can I manage PAD? · Do not smoke  Nicotine and other chemicals in cigarettes and cigars can worsen PAD  They can also increase your risk for a heart attack or stroke  Ask your healthcare provider for information if you currently smoke and need help to quit  E-cigarettes or smokeless tobacco still contain nicotine   Talk to your healthcare provider before you use these products  · Manage other health conditions  Take your medicines as directed  Follow your healthcare provider's instructions if you have high blood pressure or high cholesterol  Perform foot care and check your blood sugar levels as directed if you have diabetes  · Eat heart healthy foods  Eat whole grains, fruits, and vegetables every day  Limit salt and high-fat foods  Ask your healthcare provider for more information on a heart healthy diet  Ask if you need to lose weight  Your healthcare provider can help you create a healthy weight-loss plan  Call 911 for the following:   · You have any of the following signs of a heart attack:      ? Squeezing, pressure, or pain in your chest    ? You may  also have any of the following:     ? Discomfort or pain in your back, neck, jaw, stomach, or arm    ? Shortness of breath    ? Nausea or vomiting    ? Lightheadedness or a sudden cold sweat    · You have any of the following signs of a stroke:      ? Numbness or drooping on one side of your face     ? Weakness in an arm or leg    ? Confusion or difficulty speaking    ? Dizziness, a severe headache, or vision loss    When should I seek immediate care? · You have sores or wounds that will not heal      · You notice black or discolored skin on your arm or leg  · Your skin is cool to the touch  When should I contact my healthcare provider? · You have leg pain when you walk 1/8 mile (200 meters) or less, even with treatment  · Your legs are red, dry, or pale, even with treatment  · You have questions or concerns about your condition or care  CARE AGREEMENT:   You have the right to help plan your care  Learn about your health condition and how it may be treated  Discuss treatment options with your healthcare providers to decide what care you want to receive  You always have the right to refuse treatment  The above information is an  only   It is not intended as medical advice for individual conditions or treatments  Talk to your doctor, nurse or pharmacist before following any medical regimen to see if it is safe and effective for you  © Copyright [address] Information is for End User's use only and may not be sold, redistributed or otherwise used for commercial purposes   All illustrations and images included in CareNotes® are the copyrighted property of [employer]  or [address] [place] [address]

## 2021-09-10 ENCOUNTER — APPOINTMENT (OUTPATIENT)
Dept: LAB | Facility: CLINIC | Age: 71
End: 2021-09-10
Payer: COMMERCIAL

## 2021-09-10 DIAGNOSIS — J44.9 OBSTRUCTIVE CHRONIC BRONCHITIS WITHOUT EXACERBATION (HCC): ICD-10-CM

## 2021-09-10 DIAGNOSIS — I10 HYPERTENSION, UNSPECIFIED TYPE: ICD-10-CM

## 2021-09-10 DIAGNOSIS — R06.02 SHORTNESS OF BREATH: ICD-10-CM

## 2021-09-10 DIAGNOSIS — R05.9 COUGH: ICD-10-CM

## 2021-09-10 LAB
25(OH)D3 SERPL-MCNC: 27.1 NG/ML (ref 30–100)
ALBUMIN SERPL BCP-MCNC: 3.9 G/DL (ref 3.5–5)
ALP SERPL-CCNC: 94 U/L (ref 46–116)
ALT SERPL W P-5'-P-CCNC: 27 U/L (ref 12–78)
ANION GAP SERPL CALCULATED.3IONS-SCNC: 8 MMOL/L (ref 4–13)
AST SERPL W P-5'-P-CCNC: 20 U/L (ref 5–45)
BASOPHILS # BLD AUTO: 0.06 THOUSANDS/ΜL (ref 0–0.1)
BASOPHILS NFR BLD AUTO: 1 % (ref 0–1)
BILIRUB SERPL-MCNC: 0.51 MG/DL (ref 0.2–1)
BUN SERPL-MCNC: 15 MG/DL (ref 5–25)
CALCIUM SERPL-MCNC: 9.5 MG/DL (ref 8.3–10.1)
CHLORIDE SERPL-SCNC: 103 MMOL/L (ref 100–108)
CHOLEST SERPL-MCNC: 327 MG/DL (ref 50–200)
CO2 SERPL-SCNC: 30 MMOL/L (ref 21–32)
CREAT SERPL-MCNC: 0.69 MG/DL (ref 0.6–1.3)
EOSINOPHIL # BLD AUTO: 0.17 THOUSAND/ΜL (ref 0–0.61)
EOSINOPHIL NFR BLD AUTO: 3 % (ref 0–6)
ERYTHROCYTE [DISTWIDTH] IN BLOOD BY AUTOMATED COUNT: 14.6 % (ref 11.6–15.1)
ERYTHROCYTE [SEDIMENTATION RATE] IN BLOOD: 51 MM/HOUR (ref 0–29)
GFR SERPL CREATININE-BSD FRML MDRD: 88 ML/MIN/1.73SQ M
GLUCOSE P FAST SERPL-MCNC: 95 MG/DL (ref 65–99)
HCT VFR BLD AUTO: 42.3 % (ref 34.8–46.1)
HDLC SERPL-MCNC: 93 MG/DL
HGB BLD-MCNC: 13 G/DL (ref 11.5–15.4)
IMM GRANULOCYTES # BLD AUTO: 0.02 THOUSAND/UL (ref 0–0.2)
IMM GRANULOCYTES NFR BLD AUTO: 0 % (ref 0–2)
IRON SERPL-MCNC: 64 UG/DL (ref 50–170)
LDLC SERPL CALC-MCNC: 212 MG/DL (ref 0–100)
LYMPHOCYTES # BLD AUTO: 1.82 THOUSANDS/ΜL (ref 0.6–4.47)
LYMPHOCYTES NFR BLD AUTO: 29 % (ref 14–44)
MAGNESIUM SERPL-MCNC: 1.9 MG/DL (ref 1.6–2.6)
MCH RBC QN AUTO: 28.1 PG (ref 26.8–34.3)
MCHC RBC AUTO-ENTMCNC: 30.7 G/DL (ref 31.4–37.4)
MCV RBC AUTO: 92 FL (ref 82–98)
MONOCYTES # BLD AUTO: 0.44 THOUSAND/ΜL (ref 0.17–1.22)
MONOCYTES NFR BLD AUTO: 7 % (ref 4–12)
NEUTROPHILS # BLD AUTO: 3.86 THOUSANDS/ΜL (ref 1.85–7.62)
NEUTS SEG NFR BLD AUTO: 60 % (ref 43–75)
NONHDLC SERPL-MCNC: 234 MG/DL
NRBC BLD AUTO-RTO: 0 /100 WBCS
PLATELET # BLD AUTO: 402 THOUSANDS/UL (ref 149–390)
PMV BLD AUTO: 9.1 FL (ref 8.9–12.7)
POTASSIUM SERPL-SCNC: 4.3 MMOL/L (ref 3.5–5.3)
PROT SERPL-MCNC: 7.4 G/DL (ref 6.4–8.2)
RBC # BLD AUTO: 4.62 MILLION/UL (ref 3.81–5.12)
SODIUM SERPL-SCNC: 141 MMOL/L (ref 136–145)
TRIGL SERPL-MCNC: 108 MG/DL
VIT B12 SERPL-MCNC: 401 PG/ML (ref 100–900)
WBC # BLD AUTO: 6.37 THOUSAND/UL (ref 4.31–10.16)

## 2021-09-10 PROCEDURE — 85025 COMPLETE CBC W/AUTO DIFF WBC: CPT

## 2021-09-10 PROCEDURE — 82306 VITAMIN D 25 HYDROXY: CPT

## 2021-09-10 PROCEDURE — 36415 COLL VENOUS BLD VENIPUNCTURE: CPT

## 2021-09-10 PROCEDURE — 80053 COMPREHEN METABOLIC PANEL: CPT

## 2021-09-10 PROCEDURE — 86003 ALLG SPEC IGE CRUDE XTRC EA: CPT

## 2021-09-10 PROCEDURE — 83540 ASSAY OF IRON: CPT

## 2021-09-10 PROCEDURE — 82607 VITAMIN B-12: CPT

## 2021-09-10 PROCEDURE — 83735 ASSAY OF MAGNESIUM: CPT

## 2021-09-10 PROCEDURE — 80061 LIPID PANEL: CPT

## 2021-09-10 PROCEDURE — 85652 RBC SED RATE AUTOMATED: CPT

## 2021-09-10 PROCEDURE — 82785 ASSAY OF IGE: CPT

## 2021-09-11 ENCOUNTER — HOSPITAL ENCOUNTER (OUTPATIENT)
Dept: RADIOLOGY | Facility: HOSPITAL | Age: 71
Discharge: HOME | End: 2021-09-11
Payer: COMMERCIAL

## 2021-09-11 ENCOUNTER — APPOINTMENT (OUTPATIENT)
Dept: LAB | Facility: HOSPITAL | Age: 71
End: 2021-09-11
Payer: COMMERCIAL

## 2021-09-11 DIAGNOSIS — J44.9 OBSTRUCTIVE CHRONIC BRONCHITIS WITHOUT EXACERBATION (HCC): ICD-10-CM

## 2021-09-11 DIAGNOSIS — R06.02 SHORTNESS OF BREATH: ICD-10-CM

## 2021-09-11 DIAGNOSIS — R05.9 COUGH: ICD-10-CM

## 2021-09-11 DIAGNOSIS — I10 HYPERTENSION, UNSPECIFIED TYPE: ICD-10-CM

## 2021-09-11 PROCEDURE — 70210 X-RAY EXAM OF SINUSES: CPT

## 2021-09-11 PROCEDURE — 71046 X-RAY EXAM CHEST 2 VIEWS: CPT

## 2021-09-11 PROCEDURE — 87086 URINE CULTURE/COLONY COUNT: CPT

## 2021-09-12 LAB — BACTERIA UR CULT: NORMAL

## 2021-09-13 LAB
A ALTERNATA IGE QN: <0.1 KUA/I
A FUMIGATUS IGE QN: <0.1 KUA/I
ALLERGEN COMMENT: NORMAL
BERMUDA GRASS IGE QN: <0.1 KUA/I
BOXELDER IGE QN: <0.1 KUA/I
C HERBARUM IGE QN: <0.1 KUA/I
CAT DANDER IGE QN: <0.1 KUA/I
CMN PIGWEED IGE QN: <0.1 KUA/I
COMMON RAGWEED IGE QN: <0.1 KUA/I
COTTONWOOD IGE QN: <0.1 KUA/I
D FARINAE IGE QN: <0.1 KUA/I
D PTERONYSS IGE QN: <0.1 KUA/I
DOG DANDER IGE QN: <0.1 KUA/I
LONDON PLANE IGE QN: <0.1 KUA/I
MOUSE URINE PROT IGE QN: <0.1 KUA/I
MT JUNIPER IGE QN: <0.1 KUA/I
MUGWORT IGE QN: <0.1 KUA/I
P NOTATUM IGE QN: <0.1 KUA/I
ROACH IGE QN: <0.1 KUA/I
SHEEP SORREL IGE QN: <0.1 KUA/I
SILVER BIRCH IGE QN: <0.1 KUA/I
TIMOTHY IGE QN: <0.1 KUA/I
TOTAL IGE SMQN RAST: 19.4 KU/L (ref 0–113)
WALNUT IGE QN: <0.1 KUA/I
WHITE ASH IGE QN: <0.1 KUA/I
WHITE ELM IGE QN: <0.1 KUA/I
WHITE MULBERRY IGE QN: <0.1 KUA/I
WHITE OAK IGE QN: <0.1 KUA/I

## 2021-09-16 ENCOUNTER — TELEPHONE (OUTPATIENT)
Dept: GASTROENTEROLOGY | Facility: CLINIC | Age: 71
End: 2021-09-16

## 2021-09-16 NOTE — TELEPHONE ENCOUNTER
Returned Pt call, She states she is due for colon and has moved back from [place]  She also has bleeding from hemorrhoid  LMOM advising he that we should schedule an OV, last ov 2015, last colon 2014

## 2021-09-20 ENCOUNTER — HOSPITAL ENCOUNTER (OUTPATIENT)
Dept: RADIOLOGY | Facility: IMAGING CENTER | Age: 71
Discharge: HOME | End: 2021-09-20
Payer: COMMERCIAL

## 2021-09-20 VITALS — BODY MASS INDEX: 38.89 KG/M2 | HEIGHT: 61 IN | WEIGHT: 206 LBS

## 2021-09-20 DIAGNOSIS — Z12.31 ENCOUNTER FOR SCREENING MAMMOGRAM FOR MALIGNANT NEOPLASM OF BREAST: ICD-10-CM

## 2021-09-20 PROCEDURE — 77063 BREAST TOMOSYNTHESIS BI: CPT

## 2021-09-20 PROCEDURE — 77067 SCR MAMMO BI INCL CAD: CPT

## 2021-09-24 ENCOUNTER — OFFICE VISIT (OUTPATIENT)
Dept: GASTROENTEROLOGY | Facility: CLINIC | Age: 71
End: 2021-09-24
Payer: COMMERCIAL

## 2021-09-24 ENCOUNTER — TELEPHONE (OUTPATIENT)
Dept: VASCULAR SURGERY | Facility: CLINIC | Age: 71
End: 2021-09-24

## 2021-09-24 VITALS
HEART RATE: 70 BPM | BODY MASS INDEX: 39.65 KG/M2 | DIASTOLIC BLOOD PRESSURE: 80 MMHG | WEIGHT: 210 LBS | HEIGHT: 61 IN | SYSTOLIC BLOOD PRESSURE: 142 MMHG

## 2021-09-24 DIAGNOSIS — E66.01 MORBID OBESITY DUE TO EXCESS CALORIES (HCC): ICD-10-CM

## 2021-09-24 DIAGNOSIS — D12.3 ADENOMATOUS POLYP OF TRANSVERSE COLON: Primary | ICD-10-CM

## 2021-09-24 DIAGNOSIS — E66.01 MORBID OBESITY (HCC): ICD-10-CM

## 2021-09-24 PROCEDURE — 99204 OFFICE O/P NEW MOD 45 MIN: CPT | Performed by: INTERNAL MEDICINE

## 2021-09-24 NOTE — PROGRESS NOTES
[facility] - Outpatient Consultation  Bran Zabala 79 y o  female MRN: [id number]  Encounter: [id number]          ASSESSMENT AND PLAN:      1  Adenomatous polyp of transverse colon   patient has presented with history of colon polyp  Patient has history of multiple adenomatous polyps including villous adenoma removed in the past   Patient has not had any colonoscopy over the last six years  She had moved out to [place] to live with her son  She came back from [place] 18 months ago when [place] pandemic started  Recently she has felt lumpy feeling during her bowel movements  Denies any rectal bleeding  Denies any abdominal pain or discomfort  There is no nausea or vomiting  She denies any dysphagia or odynophagia  She has a bariatric adjustable gastric band surgery done in the past   Recently she has visited her the attic surgeon and had her port injected  She is eating less now  She is open for weight loss in the near future  - Colonoscopy; Future    2  Morbid obesity [facility])     Patient recently saw her bariatric surgeon and had port injected  She is eating less now  There is no nausea or vomiting  She denies any chest pain, cough or wheezing  3  Morbid obesity due to excess calories [facility])    She has history of morbid obesity  ______________________________________________________________________    HPI:    Change in bowel habits and rectal discomfort  Occasional lumpy feeling in the rectum  No bleeding per rectum  Denies any abdominal pain or discomfort  There is no nausea or vomiting  She denies any chest pain, cough or wheezing  She denies any palpitation, orthopnea or exertional dyspnea  There is no dysuria hematuria  REVIEW OF SYSTEMS:    CONSTITUTIONAL: Denies any fever, chills, rigors, and weight loss  HEENT: No earache or tinnitus  Denies hearing loss or visual disturbances  CARDIOVASCULAR: No chest pain or palpitations     RESPIRATORY: Denies any cough, hemoptysis, shortness of breath or dyspnea on exertion  GASTROINTESTINAL: As noted in the History of Present Illness  GENITOURINARY: No problems with urination  Denies any hematuria or dysuria  NEUROLOGIC: No dizziness or vertigo, denies headaches  MUSCULOSKELETAL: Denies any muscle or joint pain  SKIN: Denies skin rashes or itching  ENDOCRINE: Denies excessive thirst  Denies intolerance to heat or cold  PSYCHOSOCIAL: Denies depression or anxiety  Denies any recent memory loss         Historical Information   Past Medical History:   Diagnosis Date    High blood pressure      Past Surgical History:   Procedure Laterality Date    GASTROSTOMY      HYSTERECTOMY Bilateral     complete  age 54    IR IVC FILTER REMOVAL  12/7/2015    LAPAROSCOPIC GASTRIC BANDING      POPLITEAL ARTERY STENT      REPLACEMENT TOTAL KNEE      TONSILLECTOMY      US GUIDED VASCULAR ACCESS  12/7/2015     Social History   Social History     Substance and Sexual Activity   Alcohol Use Never     Social History     Substance and Sexual Activity   Drug Use Never     Social History     Tobacco Use   Smoking Status Never Smoker   Smokeless Tobacco Never Used     Family History   Problem Relation Age of Onset    Ovarian cancer Mother     No Known Problems Father     Colon cancer Cousin     Cancer Maternal Grandmother     No Known Problems Maternal Grandfather     No Known Problems Paternal Grandmother     No Known Problems Paternal Grandfather     No Known Problems Son     No Known Problems Son     No Known Problems Maternal Aunt     No Known Problems Paternal Aunt        Meds/Allergies       Current Outpatient Medications:     albuterol (PROVENTIL HFA,VENTOLIN HFA) 90 mcg/act inhaler    ALPRAZolam (XANAX) 0 5 mg tablet    aspirin (ECOTRIN LOW STRENGTH) 81 mg EC tablet    Aspirin-Acetaminophen-Caffeine (EXCEDRIN PO)    diazePAM (VALIUM PO)    DULoxetine (CYMBALTA) 30 mg delayed release capsule   esomeprazole (NEXIUM) 40 MG capsule    valsartan-hydrochlorothiazide (DIOVAN-HCT) 80-12 5 MG per tablet    ipratropium (ATROVENT) 0 06 % nasal spray    MAGNESIUM PO    POTASSIUM PO    valsartan (DIOVAN) 80 mg tablet    Allergies   Allergen Reactions    Erythromycin Nausea Only     Nausea      Levofloxacin     Codeine Other (See Comments)     RINGING IN EARS AND DIFFICULTY SLEEPING           Objective     Blood pressure 142/80, pulse 70, height 5' 1" (1 549 m), weight 95 3 kg (210 lb)  Body mass index is 39 68 kg/m²  PHYSICAL EXAM:      General Appearance:   Alert, cooperative, no distress   HEENT:   Normocephalic, atraumatic, anicteric      Neck:  Supple, symmetrical, trachea midline   Lungs:   Clear to auscultation bilaterally; no rales, rhonchi or wheezing; respirations unlabored    Heart[de-identified]   Regular rate and rhythm; no murmur, rub, or gallop  Abdomen:   Soft, non-tender, non-distended; normal bowel sounds; no masses, no organomegaly   Epigastrium is nontender  There is no mass felt   Port felt  Genitalia:   Deferred    Rectal:   Deferred    Extremities:  No cyanosis, clubbing or edema    Pulses:  2+ and symmetric    Skin:  No jaundice, rashes, or lesions    Lymph nodes:  No palpable cervical lymphadenopathy        Lab Results:   No visits with results within 1 Day(s) from this visit  Latest known visit with results is:   Appointment on 09/11/2021   Component Date Value    Urine Culture 09/11/2021 No Growth <1000 cfu/mL          Radiology Results:   XR sinuses < 3 vw    Result Date: 9/13/2021  Narrative: PARANASAL SINUSES INDICATION:  R05: Cough  COMPARISON:  None VIEWS:  XR SINUSES < 3 VW FINDINGS: No evidence of sinus opacification or air-fluid levels  No lytic or blastic lesions are identified  Impression: No radiographic evidence of acute sinusitis   Workstation performed: [id number]     XR chest pa & lateral    Result Date: 9/13/2021  Narrative: CHEST INDICATION:   R05: Cough J44 9: Chronic obstructive pulmonary disease, unspecified R06 02: Shortness of breath I10: Essential (primary) hypertension  COMPARISON:  None EXAM PERFORMED/VIEWS:  XR CHEST PA & LATERAL FINDINGS: Cardiomediastinal silhouette appears unremarkable  No focal airspace consolidation  No pneumothorax or pleural effusion  Osseous structures appear within normal limits for patient age  Impression: No acute cardiopulmonary disease  Workstation performed: [id number]     Mammo screening bilateral w 3d & cad    Result Date: 9/21/2021  Narrative: DIAGNOSIS: Encounter for screening mammogram for malignant neoplasm of breast TECHNIQUE: Digital screening mammography was performed  Computer Aided Detection (CAD) analyzed all applicable images  COMPARISONS: Prior breast imaging dated: 10/04/2016, 09/01/2016, 12/04/2015, 11/25/2015, 11/21/2015, 11/13/2015, 08/21/2015, 07/08/2014, and 07/02/2013 RELEVANT HISTORY: Family Breast Cancer History: No known family history of breast cancer  Family Medical History: Family medical history includes colon cancer in cousin and ovarian cancer in mother  Personal History: No known relevant hormone history  Surgical history includes hysterectomy  No known relevant medical history  The patient is scheduled in a reminder system for screening mammography  8-10% of cancers will be missed on mammography  Management of a palpable abnormality must be based on clinical grounds  Patients will be notified of their results via letter from our facility  Accredited by [employer] of Radiology and FDA  RISK ASSESSMENT: 5 Year Tyrer-Cuzick: 1 28 % 10 Year Tyrer-Cuzick: 2 71 % Lifetime Tyrer-Cuzick: 4 29 % TISSUE DENSITY: The breasts are almost entirely fatty  INDICATION: Yuliet Jensen is a 79 y o  female presenting for screening mammography  FINDINGS: There are no suspicious masses, grouped microcalcifications or areas of architectural distortion  The skin and nipple areolar complex are unremarkable  Impression: No mammographic evidence of malignancy  ASSESSMENT/BI-RADS CATEGORY: Left: 1 - Negative Right: 1 - Negative Overall: 1 - Negative RECOMMENDATION:      - Routine screening mammogram in 1 year for both breasts   Workstation ID: [id number]

## 2021-09-24 NOTE — TELEPHONE ENCOUNTER
Patient called and requested antibiotics for a dental appt  She said her dentist recommended because of her stent and prosthetic knee replacement  I told her that if dentist recommended , we ask that the dentist order the antibiotic

## 2021-10-07 ENCOUNTER — ANESTHESIA (OUTPATIENT)
Dept: GASTROENTEROLOGY | Facility: AMBULATORY SURGERY CENTER | Age: 71
End: 2021-10-07

## 2021-10-07 ENCOUNTER — ANESTHESIA EVENT (OUTPATIENT)
Dept: GASTROENTEROLOGY | Facility: AMBULATORY SURGERY CENTER | Age: 71
End: 2021-10-07

## 2021-10-07 ENCOUNTER — HOSPITAL ENCOUNTER (OUTPATIENT)
Dept: GASTROENTEROLOGY | Facility: AMBULATORY SURGERY CENTER | Age: 71
Discharge: HOME | End: 2021-10-07
Payer: COMMERCIAL

## 2021-10-07 VITALS
OXYGEN SATURATION: 98 % | WEIGHT: 202 LBS | DIASTOLIC BLOOD PRESSURE: 84 MMHG | HEART RATE: 68 BPM | BODY MASS INDEX: 38.14 KG/M2 | RESPIRATION RATE: 18 BRPM | TEMPERATURE: 98 F | HEIGHT: 61 IN | SYSTOLIC BLOOD PRESSURE: 132 MMHG

## 2021-10-07 DIAGNOSIS — K58.0 IRRITABLE BOWEL SYNDROME WITH DIARRHEA: Primary | ICD-10-CM

## 2021-10-07 DIAGNOSIS — D12.3 ADENOMATOUS POLYP OF TRANSVERSE COLON: ICD-10-CM

## 2021-10-07 PROCEDURE — 00811 ANES LWR INTST NDSC NOS: CPT | Performed by: NURSE ANESTHETIST, CERTIFIED REGISTERED

## 2021-10-07 PROCEDURE — 45385 COLONOSCOPY W/LESION REMOVAL: CPT | Performed by: INTERNAL MEDICINE

## 2021-10-07 PROCEDURE — 99100 ANES PT EXTEME AGE<1 YR&>70: CPT | Performed by: NURSE ANESTHETIST, CERTIFIED REGISTERED

## 2021-10-07 RX ORDER — CHOLESTYRAMINE 4 G/9G
1 POWDER, FOR SUSPENSION ORAL
Qty: 30 PACKET | Refills: 1 | Status: SHIPPED | OUTPATIENT
Start: 2021-10-07 | End: 2021-10-29

## 2021-10-07 RX ORDER — PROPOFOL 10 MG/ML
INJECTION, EMULSION INTRAVENOUS AS NEEDED
Status: DISCONTINUED | OUTPATIENT
Start: 2021-10-07 | End: 2021-10-07

## 2021-10-07 RX ORDER — SODIUM CHLORIDE 9 MG/ML
20 INJECTION, SOLUTION INTRAVENOUS CONTINUOUS
Status: CANCELLED | OUTPATIENT
Start: 2021-10-07

## 2021-10-07 RX ORDER — SODIUM CHLORIDE 9 MG/ML
INJECTION, SOLUTION INTRAVENOUS CONTINUOUS PRN
Status: DISCONTINUED | OUTPATIENT
Start: 2021-10-07 | End: 2021-10-07

## 2021-10-07 RX ORDER — SODIUM CHLORIDE 9 MG/ML
30 INJECTION, SOLUTION INTRAVENOUS CONTINUOUS
Status: DISCONTINUED | OUTPATIENT
Start: 2021-10-07 | End: 2021-10-11 | Stop reason: HOSPADM

## 2021-10-07 RX ADMIN — PROPOFOL 50 MG: 10 INJECTION, EMULSION INTRAVENOUS at 11:30

## 2021-10-07 RX ADMIN — PROPOFOL 50 MG: 10 INJECTION, EMULSION INTRAVENOUS at 11:33

## 2021-10-07 RX ADMIN — PROPOFOL 50 MG: 10 INJECTION, EMULSION INTRAVENOUS at 11:39

## 2021-10-07 RX ADMIN — PROPOFOL 50 MG: 10 INJECTION, EMULSION INTRAVENOUS at 11:28

## 2021-10-07 RX ADMIN — PROPOFOL 50 MG: 10 INJECTION, EMULSION INTRAVENOUS at 11:36

## 2021-10-07 RX ADMIN — PROPOFOL 50 MG: 10 INJECTION, EMULSION INTRAVENOUS at 11:27

## 2021-10-07 RX ADMIN — SODIUM CHLORIDE: 9 INJECTION, SOLUTION INTRAVENOUS at 11:22

## 2021-10-29 DIAGNOSIS — K58.0 IRRITABLE BOWEL SYNDROME WITH DIARRHEA: ICD-10-CM

## 2021-10-29 RX ORDER — CHOLESTYRAMINE 4 G/9G
1 POWDER, FOR SUSPENSION ORAL
Qty: 90 PACKET | Refills: 1 | Status: SHIPPED | OUTPATIENT
Start: 2021-10-29 | End: 2022-08-09

## 2022-05-10 ENCOUNTER — HOSPITAL ENCOUNTER (OUTPATIENT)
Dept: NON INVASIVE DIAGNOSTICS | Facility: CLINIC | Age: 72
Discharge: HOME | End: 2022-05-10
Payer: COMMERCIAL

## 2022-05-10 DIAGNOSIS — I73.9 PAD (PERIPHERAL ARTERY DISEASE) (HCC): ICD-10-CM

## 2022-05-10 PROCEDURE — 93925 LOWER EXTREMITY STUDY: CPT | Performed by: SURGERY

## 2022-05-10 PROCEDURE — 93925 LOWER EXTREMITY STUDY: CPT

## 2022-05-10 PROCEDURE — 93922 UPR/L XTREMITY ART 2 LEVELS: CPT | Performed by: SURGERY

## 2022-05-10 PROCEDURE — 93923 UPR/LXTR ART STDY 3+ LVLS: CPT

## 2022-06-01 ENCOUNTER — OFFICE VISIT (OUTPATIENT)
Dept: VASCULAR SURGERY | Facility: CLINIC | Age: 72
End: 2022-06-01
Payer: COMMERCIAL

## 2022-06-01 ENCOUNTER — TELEPHONE (OUTPATIENT)
Dept: VASCULAR SURGERY | Facility: CLINIC | Age: 72
End: 2022-06-01

## 2022-06-01 VITALS
DIASTOLIC BLOOD PRESSURE: 72 MMHG | WEIGHT: 178.2 LBS | HEART RATE: 62 BPM | HEIGHT: 61 IN | TEMPERATURE: 97.2 F | BODY MASS INDEX: 33.64 KG/M2 | SYSTOLIC BLOOD PRESSURE: 152 MMHG

## 2022-06-01 DIAGNOSIS — I73.9 PAD (PERIPHERAL ARTERY DISEASE) (HCC): Primary | ICD-10-CM

## 2022-06-01 PROCEDURE — 99215 OFFICE O/P EST HI 40 MIN: CPT | Performed by: SURGERY

## 2022-06-01 RX ORDER — SODIUM CHLORIDE 9 MG/ML
75 INJECTION, SOLUTION INTRAVENOUS CONTINUOUS
OUTPATIENT
Start: 2022-06-01

## 2022-06-01 RX ORDER — GABAPENTIN 100 MG/1
CAPSULE ORAL
COMMUNITY
Start: 2022-04-25

## 2022-06-01 RX ORDER — TIOTROPIUM BROMIDE 18 UG/1
18 CAPSULE ORAL; RESPIRATORY (INHALATION)
COMMUNITY
Start: 2021-09-09 | End: 2022-09-09

## 2022-06-01 NOTE — TELEPHONE ENCOUNTER
REMINDER: Under Reason For Call, comments MUST be formatted as:   (Surgeon's Initials) / (Procedure)      Special Instructions / FYI: patient requests dates in June  Her sone will be here from [place] to help her  June 19, 20, 21, 27, 28 or 29  Procedure: RLE angiogram, L femoral access    Level: 4 - Route clearance(s) to [facility] Surgery Coordinator Pool    Allergies: Erythromycin, Levofloxacin, and Codeine    Instructions Given: Angiogram Instructions    Dialysis: Patient is not on dialysis  Return Visit Required Prior to Procedure: No     Consent: I certify that patient has signed, printed, timed, and dated their surgery consent  I certify that the patient's LEGAL NAME and DATE OF BIRTH are written in the upper left corner on BOTH sides of the consent  I certify that BOTH sides of the completed surgery consent have been scanned into the patient's Epic chart by myself on 6/1/2022  Yes, I have LABELED the consent in Epic as Consent for Vascular Procedure  For Surgical Clearances     Levels   1-3   ROUTE this encounter to [facility] Clearance Pool (AND)   [facility] Surgery Coordinator Pool     Level   4   ROUTE this encounter to [facility] Surgery Coordinator Pool       HYDRATION CLEARANCES   ONLY ROUTE TO  [facility] Clearance Pool     Patient does not require any pre operative clearance  Yes, I have ROUTED this encounter to [facility] Surgery Coordinator and/or [facility] Clearance Pool

## 2022-06-01 NOTE — PROGRESS NOTES
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  She is s/p viabahn stent placement  (11/15)  PAD (peripheral artery disease) ([address] )  -s/p R viabahn stent placement for bleeding pseudoaneurysm as complication of R knee replacement 11/15  -s/p procedure in [place] where it sounds like they did a RLE agram and "cleaned things out put want to do the same thing to clean it out more"  -reviewed LEADs which reports >75% stenosis at the distal stent but ABIs remain wnl; this is increased from prior  -patient continues to be asymptomatic with palpable pulses  -discussed in stent stenosis and risk of stentgraft thrombosis; as this stenosis is worsening, we are going to do angiogram at this point to prevent instent occlusion; this is not urgent and thus we will need to wait until after the contrast dye shortage  -plan for RLE angiogram, L femoral access  -labs     Medications  -cont ASA     Operative Scheduling Information:    Hospital:  Any IR/endo suite    Physician:  Me    Surgery: RLE angiogram, L femoral access    Urgency:  Standard    Level:  Level 4: Outpatients to be scheduled for screening procedures and elective surgery that can be delayed for longer than one month without reasonable expectation of detriment to patient  Can wait until after contrast shortage    Case Length:  Normal    Post-op Bed:  Outpatient    OR Table:  IR    Equipment Needs:  None    Medication Instructions:  Aspirin:   Continue (do not hold)    Hydration:  No    Contrast Allergy:  no      Subjective:      Patient ID: Velasquez Barton is a 70 y o  female  Pt is here to rev CASH  5/10/22  Pt c/o  RL pain and swelling in calf and behind her knee  Pt is taking ASA  HPI:    Patient known to me from '13 when I placed a viabahn stent for bleeding PSA at [facility] after injury from TKA      At last visit, she had moved to [place] to be closer to her twin boys and grandchildren but now moved back  She reports she had a procedure while in [facility] to work on the right leg where he "opened things up" and that he said he wants to repeat the procedure to "open it up some more"  It sounds like she had an access site hematoma after that procedure    She denies symptoms including claudication, rest pain, wounds  Continues to complain of generalized muscle cramps, mainly at night affecting her thighs, pelvis, legs, etc   These are not related to activity  Denies calf cramping with walking or short inclines  Continues ASA daily  The following portions of the patient's history were reviewed and updated as appropriate: allergies, current medications, past family history, past medical history, past social history, past surgical history and problem list     Review of Systems   Constitutional: Negative  HENT: Negative  Eyes: Negative  Respiratory: Negative  Cardiovascular: Positive for leg swelling  Gastrointestinal: Negative  Endocrine: Negative  Genitourinary: Negative  Musculoskeletal: Positive for arthralgias  Negative for gait problem  RL pain   Skin: Negative  Negative for wound  Allergic/Immunologic: Negative  Neurological: Negative  Hematological: Negative  Psychiatric/Behavioral: Negative  Objective:      /72 (BP Location: Left arm, Patient Position: Sitting, Cuff Size: Standard)   Pulse 62   Temp (!) 97 2 °F (36 2 °C) (Tympanic)   Ht 5' 1" (1 549 m)   Wt 80 8 kg (178 lb 3 2 oz)   BMI 33 67 kg/m²          Physical Exam  Vitals and nursing note reviewed  Constitutional:       Appearance: She is well-developed  HENT:      Head: Normocephalic and atraumatic  Eyes:      Conjunctiva/sclera: Conjunctivae normal    Cardiovascular:      Rate and Rhythm: Normal rate and regular rhythm  Pulses:           Radial pulses are 2+ on the right side and 2+ on the left side          Dorsalis pedis pulses are 2+ on the right side and 2+ on the left side  Posterior tibial pulses are 0 on the right side and 2+ on the left side  Heart sounds: Normal heart sounds  No murmur heard  Comments: No carotid bruits  Pulmonary:      Effort: Pulmonary effort is normal  No respiratory distress  Breath sounds: Normal breath sounds  No wheezing  Abdominal:      General: There is no distension  Palpations: Abdomen is soft  Tenderness: There is no abdominal tenderness  There is no rebound  Musculoskeletal:         General: Normal range of motion  Cervical back: Normal range of motion and neck supple  Right lower le+ Edema present  Left lower le+ Edema present  Skin:     General: Skin is warm and dry  Neurological:      Mental Status: She is alert and oriented to person, place, and time  Psychiatric:         Behavior: Behavior normal            I have reviewed and made appropriate changes to the review of systems input by the medical assistant      Vitals:    22 1136   BP: 152/72   BP Location: Left arm   Patient Position: Sitting   Cuff Size: Standard   Pulse: 62   Temp: (!) 97 2 °F (36 2 °C)   TempSrc: Tympanic   Weight: 80 8 kg (178 lb 3 2 oz)   Height: 5' 1" (1 549 m)       Patient Active Problem List   Diagnosis    DJD (degenerative joint disease) of cervical spine    Arthritis    Bilateral edema of lower extremity    COPD [facility]chronic obstructive pulmonary disease) ([facility])    PAD (peripheral artery disease) ([facility])    Morbid obesity ([facility])       Past Surgical History:   Procedure Laterality Date    COLONOSCOPY      GASTROSTOMY      HYSTERECTOMY Bilateral     complete  age 54    IR IVC FILTER REMOVAL  2015    JOINT REPLACEMENT      bilateral knee    LAPAROSCOPIC GASTRIC BANDING      POPLITEAL ARTERY STENT      REPLACEMENT TOTAL KNEE      TONSILLECTOMY      US GUIDED VASCULAR ACCESS  2015       Family History   Problem Relation Age of Onset  Ovarian cancer Mother     No Known Problems Father     Colon cancer Cousin     Cancer Cousin         colon    Cancer Maternal Grandmother     No Known Problems Maternal Grandfather     No Known Problems Paternal Grandmother     No Known Problems Paternal Grandfather     No Known Problems Son     No Known Problems Son     No Known Problems Maternal Aunt     No Known Problems Paternal Aunt        Social History     Socioeconomic History    Marital status: Legally      Spouse name: Not on file    Number of children: Not on file    Years of education: Not on file    Highest education level: Not on file   Occupational History    Not on file   Tobacco Use    Smoking status: Never Smoker    Smokeless tobacco: Never Used   Vaping Use    Vaping Use: Never used   Substance and Sexual Activity    Alcohol use: Never    Drug use: Never    Sexual activity: Not on file   Other Topics Concern    Not on file   Social History Narrative    Not on file     Social Determinants of Health     Financial Resource Strain: Not on file   Food Insecurity: Not on file   Transportation Needs: Not on file   Physical Activity: Not on file   Stress: Not on file   Social Connections: Not on file   Intimate Partner Violence: Not on file   Housing Stability: Not on file       Allergies   Allergen Reactions    Erythromycin Nausea Only     Nausea      Levofloxacin     Codeine Other (See Comments)     RINGING IN EARS AND DIFFICULTY SLEEPING         Current Outpatient Medications:     aspirin (ECOTRIN LOW STRENGTH) 81 mg EC tablet, Take 81 mg by mouth daily, Disp: , Rfl:     Aspirin-Acetaminophen-Caffeine (EXCEDRIN PO), Take by mouth, Disp: , Rfl:     DULoxetine (CYMBALTA) 30 mg delayed release capsule, Take by mouth, Disp: , Rfl:     esomeprazole (NexIUM) 40 MG capsule, Take by mouth, Disp: , Rfl:     albuterol (PROVENTIL HFA,VENTOLIN HFA) 90 mcg/act inhaler, USE 2 INHALATIONS BY MOUTH  SPACED 60 SECONDS APART, EVERY 4 TO 6 HOURS (Patient not taking: Reported on 6/1/2022), Disp: , Rfl:     ALPRAZolam (XANAX) 0 5 mg tablet, TAKE 1 TABLET BY MOUTH EVERY DAY AS NEEDED FOR SEVERE ANXIETY (Patient not taking: Reported on 6/1/2022), Disp: , Rfl:     cholestyramine (QUESTRAN) 4 g packet, TAKE 1 PACKET (4 G TOTAL) BY MOUTH DAILY AT BEDTIME (Patient not taking: No sig reported), Disp: 90 packet, Rfl: 1    diazePAM (VALIUM PO), Take by mouth (Patient not taking: Reported on 6/1/2022), Disp: , Rfl:     gabapentin (NEURONTIN) 100 mg capsule, TAKE 1 CAPSULE BY MOUTH THREE TIMES A DAY FOR 30 DAYS (Patient not taking: No sig reported), Disp: , Rfl:     ipratropium (ATROVENT) 0 06 % nasal spray, 2 sprays into each nostril 4 (four) times a day (Patient not taking: Reported on 5/7/2021), Disp: 15 mL, Rfl: 0    MAGNESIUM PO, Take by mouth, Disp: , Rfl:     POTASSIUM PO, Take by mouth, Disp: , Rfl:     tiotropium (Spiriva HandiHaler) 18 mcg inhalation capsule, Place 18 mcg into inhaler and inhale (Patient not taking: No sig reported), Disp: , Rfl:     valsartan (DIOVAN) 80 mg tablet, Take 80 mg by mouth daily, Disp: , Rfl:

## 2022-06-01 NOTE — PATIENT INSTRUCTIONS
1) Right popliteal stent  -the ultrasound showed that there is a narrowing at the end of the stent  -the blood flow numbers have not changed and you are still not having any symptoms  -we discussed either doing a procedure or watching this area; because the narrowing is worse, we are going to intervene at this point  -continue your aspirin once daily

## 2022-06-03 NOTE — TELEPHONE ENCOUNTER
Patient called to see if we has a date for her procedure with Dr Franco Laughter I told patient we are trying to get her scheduled for 6-28-22 but will have to call her back once we can confirm this date   The next date would be 7-7-22 SLB/Hybrid LLF

## 2022-06-06 NOTE — TELEPHONE ENCOUNTER
Verified patient's insurance   CONFIRMED - Patient's insurance is [employer]  Is patient requesting a call when authorization has been obtained? Patient did not request a call  Surgery Date: 6/28/22  Primary Surgeon: MERA // DoctorLynn (NPI: [id number])  Assisting Surgeon: Not Applicable (N/A)  Facility: [facility] (Tax: [id number] / NPI: [id number])  Inpatient / Outpatient: Outpatient  Level: 4    Clearance Received: No clearance ordered  Consent Received: Yes, scanned into Epic on 6/1/22  Medication Hold / Last Dose: Not Applicable (N/A)  VQI Spreadsheet: Not Applicable (N/A)  IR Notified: 6/6/22  Rep   Notified: Not Applicable (N/A)  Equipment Needs: Not Applicable (N/A)  Vas Lab Requested: Not Applicable (N/A)  Patient Contacted: 6/6/22    Diagnosis: I73 9  Procedure/ CPT Code(s): Angiogram // CPT: 22036, 52339, [id number] and 65244 // Procedure to take place in IR [Referral Based]    For varicose vein related procedures:   Last LEVDR: Not Applicable (N/A)  CEAP Classification: Not Applicable (N/A)  VCSS: Not Applicable (N/A)    Post Operative Date/ Time: 7/15/22 , 2:00pm Bonita Carroll) with DoctorLynn (NPI: [id number])     Pt will have blood work done next week in [address] [place] [address]

## 2022-06-07 NOTE — TELEPHONE ENCOUNTER
Authorization requirements reviewed  Please refer to Velasquez Kelley / Adriana Levo number [id number] for case updates      No authorization required

## 2022-06-20 ENCOUNTER — TELEPHONE (OUTPATIENT)
Dept: RADIOLOGY | Facility: HOSPITAL | Age: 72
End: 2022-06-20

## 2022-06-20 NOTE — PRE-PROCEDURE INSTRUCTIONS
Pre-procedure Instructions for Interventional Radiology  [address] [place] [address] [address] [phone]    You are scheduled for a/an RLE angiogram with intervention  On Tuesday 7/28/22  Your tentative arrival time is [id number]  Short stay will notify you the day before your procedure with the exact arrival time and the location to arrive  To prepare for your procedure:  1  Please arrange for someone to drive you home after the procedure and stay with you until the next morning if you are instructed to do so  This is typically for patients receiving some type of sedative or anesthetic for the procedure  2  DO NOT EAT OR DRINK ANYTHING after midnight on the evening before your procedure including candy & gum   3  ONLY SIPS OF WATER with your medications are allowed on the morning of your procedure  4  TAKE ALL OF YOUR REGULAR MEDICATIONS THE MORNING OF YOUR PROCEDURE with sips of water! We may call you to stop some of your blood sugar, blood pressure and blood thinning medications depending on the procedure  Please take all of these medications unless we instruct you to stop them  5  If you have an allergy to x-ray dye, please contact Interventional Radiology for an x-ray dye preparation which usually consists of an oral steroid and Benadryl  The day of your procedure:  1  Bring a list of the medications you take at home  2  Bring medications you take for breathing problems (such as inhalers), medications for chest pain, or both  3  Bring a case for your glasses or contacts  4  Bring your insurance card and a form of photo ID   5  Please leave all valuables such as credit cards and jewelry at home  6  Report to the registration desk in the main lobby at the [facility], [facility]  Ask to be directed to [facility]    7  While your procedure is being performed, your family may wait in the Radiology Waiting Room on the 1st floor in Radiology  if they need to leave, they may provide a number to be called following the procedure  8  Be prepared to stay overnight just in case  Sometimes procedures will indicate the need for further observation or treatment  9  If you are scheduled for a follow-up visit with the Interventional Radiologist after your procedure, you will be called with a date and time  10  Covid vaccine and booster completed      Special Instructions (Medications to stop taking before your procedure etc ):

## 2022-06-22 ENCOUNTER — APPOINTMENT (OUTPATIENT)
Dept: LAB | Facility: CLINIC | Age: 72
End: 2022-06-22
Payer: COMMERCIAL

## 2022-06-22 DIAGNOSIS — I73.9 PAD (PERIPHERAL ARTERY DISEASE) (HCC): ICD-10-CM

## 2022-06-22 LAB
ANION GAP SERPL CALCULATED.3IONS-SCNC: 8 MMOL/L (ref 4–13)
BUN SERPL-MCNC: 13 MG/DL (ref 5–25)
CALCIUM SERPL-MCNC: 9.4 MG/DL (ref 8.4–10.2)
CHLORIDE SERPL-SCNC: 104 MMOL/L (ref 96–108)
CO2 SERPL-SCNC: 31 MMOL/L (ref 21–32)
CREAT SERPL-MCNC: 0.59 MG/DL (ref 0.6–1.3)
ERYTHROCYTE [DISTWIDTH] IN BLOOD BY AUTOMATED COUNT: 14.6 % (ref 11.6–15.1)
GFR SERPL CREATININE-BSD FRML MDRD: 92 ML/MIN/1.73SQ M
GLUCOSE P FAST SERPL-MCNC: 85 MG/DL (ref 65–99)
HCT VFR BLD AUTO: 40.7 % (ref 34.8–46.1)
HGB BLD-MCNC: 12.8 G/DL (ref 11.5–15.4)
INR PPP: 0.87 (ref 0.84–1.19)
MCH RBC QN AUTO: 28.6 PG (ref 26.8–34.3)
MCHC RBC AUTO-ENTMCNC: 31.4 G/DL (ref 31.4–37.4)
MCV RBC AUTO: 91 FL (ref 82–98)
PLATELET # BLD AUTO: 361 THOUSANDS/UL (ref 149–390)
PMV BLD AUTO: 9.6 FL (ref 8.9–12.7)
POTASSIUM SERPL-SCNC: 3.3 MMOL/L (ref 3.5–5.3)
PROTHROMBIN TIME: 11.9 SECONDS (ref 11.6–14.5)
RBC # BLD AUTO: 4.48 MILLION/UL (ref 3.81–5.12)
SODIUM SERPL-SCNC: 143 MMOL/L (ref 135–147)
WBC # BLD AUTO: 5.98 THOUSAND/UL (ref 4.31–10.16)

## 2022-06-22 PROCEDURE — 85610 PROTHROMBIN TIME: CPT

## 2022-06-22 PROCEDURE — 80048 BASIC METABOLIC PNL TOTAL CA: CPT

## 2022-06-22 PROCEDURE — 36415 COLL VENOUS BLD VENIPUNCTURE: CPT

## 2022-06-22 PROCEDURE — 85027 COMPLETE CBC AUTOMATED: CPT

## 2022-06-28 NOTE — TELEPHONE ENCOUNTER
Erin Jones / Glenys Laureen number  [phone] has been updated to reflect cancellation      No authorization required

## 2022-06-28 NOTE — TELEPHONE ENCOUNTER
Rec message to cancel agram scheduled on 6/28/22 with Dr Shin due to a family emergency  Patient was called and advised case is canceled, office will call with a new date

## 2022-06-29 NOTE — TELEPHONE ENCOUNTER
Spoke with Kim Cabrera this afternoon  She called twice requesting a new procedure date for her agram  Per LMD she can wait till after the dye shortage, patient would like sooner rather than later  I tentatively put her in 7/7/22 [facility] for LMD however I have to check to make sure this is ok  She is day float

## 2022-07-06 NOTE — TELEPHONE ENCOUNTER
Spoke w/ Madhav Walters for direction due to time restrictions with jose on Thursday  We were able to have Dr Octavio Stiles due [facility] rounds so Dr Hossein Giraldo can start earlier on her cases which would allow Russell Garayfred to stay on the schedule for tomorrow afternoon

## 2022-07-08 NOTE — TELEPHONE ENCOUNTER
Morales for pt requesting call back to choose date of 7/27 in [facility] or 8/12 in [facility] with Dr Shin for her angiogram

## 2022-07-11 NOTE — TELEPHONE ENCOUNTER
From: Akash Valladares <[email or web address]   Sent: Monday, July 11, 2022 10:17 AM  To: Doctor, Archana Helms <[email or web address]  Cc: Vascular Surgery [email or web address]  Subject: Reynaldo Hernandez [birth year]    Hi Dr Kami Scott,    This patient is scheduled with you on 8/12/22 in [facility] for her RLE agram, L fem access  She said she has no transportation and is aware we can get her there however she has no way to get home  She said she was told by a nurse in radiology that we can admit her after her procedure so that she can use our transportation to go home the next day   Please advise if you are willing to admit her after her agram

## 2022-08-04 NOTE — TELEPHONE ENCOUNTER
Graciela [place], [place]; P Patient João  Cc: P [facility] Surgery Coordinator  They all set up foThey all set up for that day  The day of they will receive a call/text from the  with the 's name, make/model of their vehicle, and the time until they get there  We have him/her set up for a **0645**   When they are done with the appointment give us a call at [phone] and we can set up their return ride  Please note that L[facility] is not a guaranteed service and is based on  availability for that area  Please have an alternate option if [facility] is not available to  or take the patient home  Thank you!             transportation  Received:  Today  Walter Waller MA  P Patient João  Abdiel Mccloud [facility] Surgery Coordinator  Patients Name: Erich Honeycutt   : 1950   Phone Number: [phone]   Appointment Date: 22   Appointment time: 7:45 am    Address: [place] [address]   Drop off Facility/Office Name: [address] [place] [address]   Drop off  Address: [address] [place] [address] Thangit: [phone]   Special notes/directions for  patient is scheduled for a procedure   Note for scheduling team

## 2022-08-09 ENCOUNTER — APPOINTMENT (OUTPATIENT)
Dept: PREADMISSION TESTING | Facility: HOSPITAL | Age: 72
End: 2022-08-09
Payer: COMMERCIAL

## 2022-08-09 NOTE — PRE-PROCEDURE INSTRUCTIONS
Pre-Surgery Instructions:   Medication Instructions    aspirin (ECOTRIN LOW STRENGTH) 81 mg EC tablet msg office whether to hold or not    Aspirin-Acetaminophen-Caffeine (EXCEDRIN PO) Stop taking 3 days prior to surgery   DULoxetine (CYMBALTA) 30 mg delayed release capsule Hold day of surgery   esomeprazole (NexIUM) 40 MG capsule Hold day of surgery  Spoke with patient medication list reviewed  Npo after midnight  Stop nsaids and vitamins 1 week prior to surgery  Surgeon to give aspirin instructions  pt has surgical soap and understands shower instructions no powder creams applied after showers  Use clean sheets towels and clothes  bring photo id and insurance card  Pt denies cvoid symptoms has been vaccinated  No jewlery or contacts   [facility] will call 8/11 with arrival time and directions

## 2022-08-12 ENCOUNTER — HOSPITAL ENCOUNTER (OUTPATIENT)
Facility: HOSPITAL | Age: 72
Setting detail: OUTPATIENT SURGERY
Discharge: HOME | End: 2022-08-12
Attending: SURGERY | Admitting: SURGERY
Payer: COMMERCIAL

## 2022-08-12 VITALS
DIASTOLIC BLOOD PRESSURE: 121 MMHG | OXYGEN SATURATION: 98 % | SYSTOLIC BLOOD PRESSURE: 184 MMHG | RESPIRATION RATE: 16 BRPM | TEMPERATURE: 97.9 F | HEART RATE: 75 BPM

## 2022-08-12 LAB
ATRIAL RATE: 66 BPM
P AXIS: 31 DEGREES
PR INTERVAL: 192 MS
QRS AXIS: -37 DEGREES
QRSD INTERVAL: 74 MS
QT INTERVAL: 412 MS
QTC INTERVAL: 431 MS
T WAVE AXIS: 1 DEGREES
VENTRICULAR RATE: 66 BPM

## 2022-08-12 PROCEDURE — 93010 ELECTROCARDIOGRAM REPORT: CPT | Performed by: INTERNAL MEDICINE

## 2022-08-12 PROCEDURE — 93005 ELECTROCARDIOGRAM TRACING: CPT

## 2022-08-15 ENCOUNTER — ANESTHESIA (OUTPATIENT)
Dept: PERIOP | Facility: HOSPITAL | Age: 72
End: 2022-08-15
Payer: COMMERCIAL

## 2022-08-15 ENCOUNTER — ANESTHESIA EVENT (OUTPATIENT)
Dept: PERIOP | Facility: HOSPITAL | Age: 72
End: 2022-08-15
Payer: COMMERCIAL

## 2022-08-15 ENCOUNTER — HOSPITAL ENCOUNTER (OUTPATIENT)
Facility: HOSPITAL | Age: 72
Setting detail: OUTPATIENT SURGERY
Discharge: HOME | End: 2022-08-15
Attending: SURGERY | Admitting: SURGERY
Payer: COMMERCIAL

## 2022-08-15 ENCOUNTER — APPOINTMENT (OUTPATIENT)
Dept: RADIOLOGY | Facility: HOSPITAL | Age: 72
End: 2022-08-15
Payer: COMMERCIAL

## 2022-08-15 VITALS
BODY MASS INDEX: 34.36 KG/M2 | HEART RATE: 59 BPM | OXYGEN SATURATION: 97 % | DIASTOLIC BLOOD PRESSURE: 75 MMHG | WEIGHT: 182 LBS | RESPIRATION RATE: 14 BRPM | HEIGHT: 61 IN | SYSTOLIC BLOOD PRESSURE: 129 MMHG | TEMPERATURE: 98.8 F

## 2022-08-15 PROBLEM — S85.009A POPLITEAL ARTERY INJURY: Status: ACTIVE | Noted: 2022-08-15

## 2022-08-15 PROCEDURE — C1769 GUIDE WIRE: HCPCS | Performed by: SURGERY

## 2022-08-15 PROCEDURE — NC001 PR NO CHARGE: Performed by: SURGERY

## 2022-08-15 PROCEDURE — C1894 INTRO/SHEATH, NON-LASER: HCPCS | Performed by: SURGERY

## 2022-08-15 PROCEDURE — 37224 PR REVSC OPN/PRG FEM/POP W/ANGIOPLASTY UNI: CPT | Performed by: SURGERY

## 2022-08-15 PROCEDURE — C1887 CATHETER, GUIDING: HCPCS | Performed by: SURGERY

## 2022-08-15 PROCEDURE — 75774 ARTERY X-RAY EACH VESSEL: CPT | Performed by: SURGERY

## 2022-08-15 PROCEDURE — 75710 ARTERY X-RAYS ARM/LEG: CPT | Performed by: SURGERY

## 2022-08-15 PROCEDURE — C1725 CATH, TRANSLUMIN NON-LASER: HCPCS | Performed by: SURGERY

## 2022-08-15 PROCEDURE — 75630 X-RAY AORTA LEG ARTERIES: CPT

## 2022-08-15 PROCEDURE — 36245 INS CATH ABD/L-EXT ART 1ST: CPT

## 2022-08-15 PROCEDURE — 76937 US GUIDE VASCULAR ACCESS: CPT | Performed by: SURGERY

## 2022-08-15 PROCEDURE — C1760 CLOSURE DEV, VASC: HCPCS | Performed by: SURGERY

## 2022-08-15 PROCEDURE — 75625 CONTRAST EXAM ABDOMINL AORTA: CPT | Performed by: SURGERY

## 2022-08-15 DEVICE — MYNX CONTROL VCD 6F 7F
Type: IMPLANTABLE DEVICE | Site: ARTERIAL | Status: FUNCTIONAL
Brand: MYNX CONTROL

## 2022-08-15 RX ORDER — SODIUM CHLORIDE 9 MG/ML
125 INJECTION, SOLUTION INTRAVENOUS CONTINUOUS
Status: DISCONTINUED | OUTPATIENT
Start: 2022-08-15 | End: 2022-08-15 | Stop reason: HOSPADM

## 2022-08-15 RX ORDER — MIDAZOLAM HYDROCHLORIDE 2 MG/2ML
INJECTION, SOLUTION INTRAMUSCULAR; INTRAVENOUS AS NEEDED
Status: DISCONTINUED | OUTPATIENT
Start: 2022-08-15 | End: 2022-08-15

## 2022-08-15 RX ORDER — METOCLOPRAMIDE HYDROCHLORIDE 5 MG/ML
10 INJECTION INTRAMUSCULAR; INTRAVENOUS ONCE AS NEEDED
Status: DISCONTINUED | OUTPATIENT
Start: 2022-08-15 | End: 2022-08-15 | Stop reason: HOSPADM

## 2022-08-15 RX ORDER — LIDOCAINE HYDROCHLORIDE 10 MG/ML
0.5 INJECTION, SOLUTION EPIDURAL; INFILTRATION; INTRACAUDAL; PERINEURAL ONCE AS NEEDED
Status: DISCONTINUED | OUTPATIENT
Start: 2022-08-15 | End: 2022-08-15 | Stop reason: HOSPADM

## 2022-08-15 RX ORDER — EPHEDRINE SULFATE 50 MG/ML
INJECTION INTRAVENOUS AS NEEDED
Status: DISCONTINUED | OUTPATIENT
Start: 2022-08-15 | End: 2022-08-15

## 2022-08-15 RX ORDER — FENTANYL CITRATE/PF 50 MCG/ML
50 SYRINGE (ML) INJECTION
Status: DISCONTINUED | OUTPATIENT
Start: 2022-08-15 | End: 2022-08-15 | Stop reason: HOSPADM

## 2022-08-15 RX ORDER — SODIUM CHLORIDE, SODIUM LACTATE, POTASSIUM CHLORIDE, CALCIUM CHLORIDE 600; 310; 30; 20 MG/100ML; MG/100ML; MG/100ML; MG/100ML
125 INJECTION, SOLUTION INTRAVENOUS CONTINUOUS
Status: DISCONTINUED | OUTPATIENT
Start: 2022-08-15 | End: 2022-08-15 | Stop reason: HOSPADM

## 2022-08-15 RX ORDER — HEPARIN SODIUM 5000 [USP'U]/ML
INJECTION, SOLUTION INTRAVENOUS; SUBCUTANEOUS AS NEEDED
Status: DISCONTINUED | OUTPATIENT
Start: 2022-08-15 | End: 2022-08-15

## 2022-08-15 RX ORDER — HYDROMORPHONE HCL/PF 1 MG/ML
0.4 SYRINGE (ML) INJECTION
Status: DISCONTINUED | OUTPATIENT
Start: 2022-08-15 | End: 2022-08-15 | Stop reason: HOSPADM

## 2022-08-15 RX ORDER — GLYCOPYRROLATE 0.2 MG/ML
INJECTION INTRAMUSCULAR; INTRAVENOUS AS NEEDED
Status: DISCONTINUED | OUTPATIENT
Start: 2022-08-15 | End: 2022-08-15

## 2022-08-15 RX ORDER — FENTANYL CITRATE 50 UG/ML
INJECTION, SOLUTION INTRAMUSCULAR; INTRAVENOUS AS NEEDED
Status: DISCONTINUED | OUTPATIENT
Start: 2022-08-15 | End: 2022-08-15

## 2022-08-15 RX ORDER — DEXMEDETOMIDINE HYDROCHLORIDE 100 UG/ML
INJECTION, SOLUTION INTRAVENOUS AS NEEDED
Status: DISCONTINUED | OUTPATIENT
Start: 2022-08-15 | End: 2022-08-15

## 2022-08-15 RX ORDER — LIDOCAINE HYDROCHLORIDE 10 MG/ML
INJECTION, SOLUTION EPIDURAL; INFILTRATION; INTRACAUDAL; PERINEURAL AS NEEDED
Status: DISCONTINUED | OUTPATIENT
Start: 2022-08-15 | End: 2022-08-15 | Stop reason: HOSPADM

## 2022-08-15 RX ORDER — OXYCODONE HYDROCHLORIDE AND ACETAMINOPHEN 5; 325 MG/1; MG/1
1 TABLET ORAL EVERY 4 HOURS PRN
Status: DISCONTINUED | OUTPATIENT
Start: 2022-08-15 | End: 2022-08-15 | Stop reason: HOSPADM

## 2022-08-15 RX ORDER — ONDANSETRON 2 MG/ML
4 INJECTION INTRAMUSCULAR; INTRAVENOUS ONCE AS NEEDED
Status: COMPLETED | OUTPATIENT
Start: 2022-08-15 | End: 2022-08-15

## 2022-08-15 RX ORDER — ALBUTEROL SULFATE 2.5 MG/3ML
2.5 SOLUTION RESPIRATORY (INHALATION) ONCE AS NEEDED
Status: DISCONTINUED | OUTPATIENT
Start: 2022-08-15 | End: 2022-08-15 | Stop reason: HOSPADM

## 2022-08-15 RX ADMIN — GLYCOPYRROLATE 0.2 MCG: 0.2 INJECTION, SOLUTION INTRAMUSCULAR; INTRAVENOUS at 12:14

## 2022-08-15 RX ADMIN — SODIUM CHLORIDE, SODIUM LACTATE, POTASSIUM CHLORIDE, AND CALCIUM CHLORIDE: .6; .31; .03; .02 INJECTION, SOLUTION INTRAVENOUS at 10:55

## 2022-08-15 RX ADMIN — ONDANSETRON 4 MG: 2 INJECTION INTRAMUSCULAR; INTRAVENOUS at 16:25

## 2022-08-15 RX ADMIN — SODIUM CHLORIDE, SODIUM LACTATE, POTASSIUM CHLORIDE, AND CALCIUM CHLORIDE 125 ML/HR: .6; .31; .03; .02 INJECTION, SOLUTION INTRAVENOUS at 10:06

## 2022-08-15 RX ADMIN — EPHEDRINE SULFATE 10 MG: 50 INJECTION INTRAVENOUS at 12:14

## 2022-08-15 RX ADMIN — SODIUM CHLORIDE 0.8 MCG/KG/HR: 900 INJECTION INTRAVENOUS at 10:55

## 2022-08-15 RX ADMIN — SODIUM CHLORIDE 125 ML/HR: 0.9 INJECTION, SOLUTION INTRAVENOUS at 12:35

## 2022-08-15 RX ADMIN — FENTANYL CITRATE 50 MCG: 50 INJECTION INTRAMUSCULAR; INTRAVENOUS at 11:00

## 2022-08-15 RX ADMIN — DEXMEDETOMIDINE HCL 8 MCG: 100 INJECTION INTRAVENOUS at 10:47

## 2022-08-15 RX ADMIN — FENTANYL CITRATE 50 MCG: 50 INJECTION INTRAMUSCULAR; INTRAVENOUS at 10:47

## 2022-08-15 RX ADMIN — DEXMEDETOMIDINE HCL 8 MCG: 100 INJECTION INTRAVENOUS at 10:53

## 2022-08-15 RX ADMIN — DEXMEDETOMIDINE HCL 4 MCG: 100 INJECTION INTRAVENOUS at 10:57

## 2022-08-15 RX ADMIN — HEPARIN SODIUM 5000 UNITS: 5000 INJECTION INTRAVENOUS; SUBCUTANEOUS at 11:20

## 2022-08-15 RX ADMIN — MIDAZOLAM 2 MG: 1 INJECTION INTRAMUSCULAR; INTRAVENOUS at 10:47

## 2022-08-15 NOTE — H&P
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  She is s/p viabahn stent placement  (11/15)     PAD (peripheral artery disease) ([facility])  -s/p R viabahn stent placement for bleeding pseudoaneurysm as complication of R knee replacement 11/15  -s[place]p procedure in [place] where it sounds like they did a RLE agram and "cleaned things out put want to do the same thing to clean it out more"  -reviewed LEADs which reports >75% stenosis at the distal stent but ABIs remain wnl; this is increased from prior  -patient continues to be asymptomatic with palpable pulses  -discussed in stent stenosis and risk of stentgraft thrombosis; as this stenosis is worsening, we are going to do angiogram at this point to prevent instent occlusion; this is not urgent and thus we will need to wait until after the contrast dye shortage  -plan for RLE angiogram, L femoral access  -labs     Medications  -cont ASA      Operative Scheduling Information:     Hospital:  Any IR/endo suite     Physician: Me     Surgery: RLE angiogram, L femoral access     Urgency:  Standard     Level:  Level 4: Outpatients to be scheduled for screening procedures and elective surgery that can be delayed for longer than one month without reasonable expectation of detriment to patient  Can wait until after contrast shortage     Case Length:  Normal     Post-op Bed:  Outpatient     OR Table:  IR     Equipment Needs:  None     Medication Instructions:  Aspirin:   Continue (do not hold)     Hydration:  No     Contrast Allergy:  no        Subjective:       Patient ID: Will Amaral is a 70 y o  female      Pt is here to rev CASH  5/10/22  Pt c/o             RL pain and swelling in calf and behind her knee   Pt is taking ASA       HPI:     Patient known to me from '15 when I placed a viabahn stent for bleeding PSA at [facility] after injury from TKA      At last visit, she had moved to [place] to be closer to her twin boys and grandchildren but now moved back  She reports she had a procedure while in [facility] to work on the right leg where he "opened things up" and that he said he wants to repeat the procedure to "open it up some more"  It sounds like she had an access site hematoma after that procedure     She denies symptoms including claudication, rest pain, wounds  Continues to complain of generalized muscle cramps, mainly at night affecting her thighs, pelvis, legs, etc   These are not related to activity  Denies calf cramping with walking or short inclines      Continues ASA daily         The following portions of the patient's history were reviewed and updated as appropriate: allergies, current medications, past family history, past medical history, past social history, past surgical history and problem list      Review of Systems   Constitutional: Negative  HENT: Negative  Eyes: Negative  Respiratory: Negative  Cardiovascular: Positive for leg swelling  Gastrointestinal: Negative  Endocrine: Negative  Genitourinary: Negative  Musculoskeletal: Positive for arthralgias  Negative for gait problem  RL pain   Skin: Negative  Negative for wound  Allergic/Immunologic: Negative  Neurological: Negative  Hematological: Negative  Psychiatric/Behavioral: Negative            Objective:        Ht 5' 1" (1 549 m)   Wt 82 6 kg (182 lb)   BMI 34 39 kg/m²               Physical Exam  Vitals and nursing note reviewed  Constitutional:       Appearance: She is well-developed  HENT:      Head: Normocephalic and atraumatic  Eyes:      Conjunctiva/sclera: Conjunctivae normal    Cardiovascular:      Rate and Rhythm: Normal rate and regular rhythm  Pulses:           Radial pulses are 2+ on the right side and 2+ on the left side  Dorsalis pedis pulses are 2+ on the right side and 2+ on the left side          Posterior tibial pulses are 1+ on the right side and 2+ on the left side  Heart sounds: Normal heart sounds  No murmur heard  Comments: No carotid bruits  Pulmonary:      Effort: Pulmonary effort is normal  No respiratory distress  Breath sounds: Normal breath sounds  No wheezing  Abdominal:      General: There is no distension  Palpations: Abdomen is soft  Tenderness: There is no abdominal tenderness  There is no rebound  Musculoskeletal:         General: Normal range of motion  Cervical back: Normal range of motion and neck supple  Right lower le+ Edema present  Left lower le+ Edema present  Skin:     General: Skin is warm and dry  Neurological:      Mental Status: She is alert and oriented to person, place, and time  Psychiatric:         Behavior: Behavior normal      I have reviewed and made appropriate changes to the review of systems input by the medical assistant      Vitals:    08/15/22 0957   Weight: 82 6 kg (182 lb)   Height: 5' 1" (1 549 m)       Patient Active Problem List   Diagnosis    DJD (degenerative joint disease) of cervical spine    Arthritis    Bilateral edema of lower extremity    COPD (chronic obstructive pulmonary disease) ([address] )    [facility]per[facility]) [facility])    Morbid obesity (HCC)       Past Surgical History:   Procedure Laterality Date    COLONOSCOPY      GASTROSTOMY      HYSTERECTOMY Bilateral     complete  age 54    IR IVC FILTER REMOVAL  2015    JOINT REPLACEMENT      bilateral knee    LAPAROSCOPIC GASTRIC BANDING      POPLITEAL ARTERY STENT      REPLACEMENT TOTAL KNEE      TONSILLECTOMY      US GUIDED VASCULAR ACCESS  2015       Family History   Problem Relation Age of Onset    Ovarian cancer Mother     No Known Problems Father     Colon cancer Cousin     Cancer Cousin         colon    Cancer Maternal Grandmother     No Known Problems Maternal Grandfather     No Known Problems Paternal Grandmother     No Known Problems Paternal Grandfather     No Known Problems Son     No Known Problems Son     No Known Problems Maternal Aunt     No Known Problems Paternal Aunt        Social History     Socioeconomic History    Marital status: Legally      Spouse name: Not on file    Number of children: Not on file    Years of education: Not on file    Highest education level: Not on file   Occupational History    Not on file   Tobacco Use    Smoking status: Never Smoker    Smokeless tobacco: Never Used   Vaping Use    Vaping Use: Never used   Substance and Sexual Activity    Alcohol use: Never    Drug use: Never    Sexual activity: Not on file   Other Topics Concern    Not on file   Social History Narrative    Not on file     Social Determinants of Health     Financial Resource Strain: Not on file   Food Insecurity: Not on file   Transportation Needs: Not on file   Physical Activity: Not on file   Stress: Not on file   Social Connections: Not on file   Intimate Partner Violence: Not on file   Housing Stability: Not on file       Allergies   Allergen Reactions    Erythromycin Nausea Only     Nausea      Levofloxacin     Codeine Other (See Comments)     RINGING IN EARS AND DIFFICULTY SLEEPING         Current Facility-Administered Medications:     lactated ringers infusion, 125 mL/hr, Intravenous, Continuous, Dorota Menjivar MD, Last Rate: 125 mL/hr at 08/15/22 1006, 125 mL/hr at 08/15/22 1006    lidocaine (PF) (XYLOCAINE-MPF) 1 % injection 0 5 mL, 0 5 mL, Infiltration, Once PRN, Dorota Menjivar MD

## 2022-08-15 NOTE — INTERIM OP NOTE
ARTERIOGRAM; right lower extremity angiogram, left femoral access  Postoperative Note  PATIENT NAME: Ena Chan  : 1950  MRN: [id number]  [facility]BR[facility] OR ROOM 02    Surgery Date: 8/15/2022    Preop Diagnosis:  PAD (peripheral artery disease) ([facility]) [I73 9]  R popliteal artery injury  R popliteal artery in stent stenosis    Post-Op Diagnosis Codes:  same    Procedure(s) (LRB):  Aortogram L CFA access w/ 6F sheath and mynx closure   RLE runoff  R popliteal PTA w/ 4x60mm  balloon    Surgeon(s) and Role:     Carlyle Shin MD - Primary     * Thierno Herrera DO - Assisting    Estimated Blood Loss:   Minimal    Anesthesia Type:   Conscious sedation w/ anesthesia    Findings: Moderate in stent restenosis treated with angioplasty    2+ R DP/PT at completion    SIGNATURE: Luh Correia MD   DATE: August 15, 2022   TIME: 12:01 PM       Vascular Quality Initiative - Peripheral Vascular Intervention     Urgency: Elective    Functional Status:  Fully active; able to carry on all predisease activities without restriction  Ambulation: Amb = independently ambulatory    Leg Symptoms    Right: Asymptomatic:  documented peripheral arterial disease without symptoms of claudication or ischemic pain      Treatment of Native Artery to Maintain Bypass Patency?:  Yes (sort of)   Left: Asymptomatic:  documented peripheral arterial disease without symptoms of claudication or ischemic pain      Treatment of Native Artery to Maintain Bypass Patency?:  No    COVID Information  COVID Symptoms Pre-Procedure: Asymptomatic    Treatment Delayed by Pandemic: None    Access   Number of Sites: 1     Access Site 1:     Side 1: Left    Site 1: Femoral Retrograde    Access Guidance 1:U/S    Largest Sheath Size 1: 6 Fr      Closure Device 1: MynxGrip      Number of Closure Devices: 1     Closure Device Outcome: Closure device successful         Procedure  Fluoro Time: 10 2 minutes  Contrast Volume: Visipaque 44 ml  DAP: 17 Gy cm2  CO2: no  Anticoagulant: Heparin  Protamine: No  If Creatinine is > 1 2 or missing, WONG Prophylaxis none     Treatment Details  Indication: Occlusive Disease,    Completion Assessment  Artery 1 treated: Popliteal   Right               Outflow: AT,PT,Peroneal: 2             Segments treated: P2+P3                       Was this Site previously treated?: Yes, Stent          TASC Grade: A          Total Treated Length: 6 cm          Total Occluded Length: 0 cm          Calcification: None (no calcification visible on fluoroscopic, CT or IVUS imaging)          Number of Treatment types (Devices):   1           Device 1          Treatment Type: Plain Balloon            Concomitant: None          Technical result: Successful (stenosis <=30%)      None     Post Procedure  Patient currently taking: Statin, No, not indicated; initial stenting 2/2 trauma and not PAD      Antiplatelet Medication, Yes    Procedure Complications: No

## 2022-08-15 NOTE — DISCHARGE INSTRUCTIONS
DISCHARGE INSTRUCTIONS  ARTERIOGRAM/ANGIOPLASTY/STENT    ACTIVITY: On the evening following the procedure, you should be mostly resting  Someone should remain with you during the evening and overnight following the procedure  On the day after your procedure, limit your activity to walking  Avoid heavy lifting (no more than 15 lbs) for the first three days  Walking up steps and normal activities may be resumed as you feel ready  You should not drive a car for at least two days following discharge from the hospital  You may ride in a car  If you have any questions regarding a particular activity, please discuss with your doctor or nurse before you are discharged  DIET:  Resume your normal diet  Drink more water than usual for the next 24 hours  PROCEDURE SITE: You may have a procedure site in your groin, arm, or foot  You may have surgical glue at your procedure site  The glue is used to cover the procedure site, assist in closure, and prevent contamination  This adhesive will darken and peel away on its own within one to two weeks  Do not pick at it  You should shower daily  Wash incision daily with soap and water, but do not rub or scrub the incision; rinse thoroughly and pat dry  Do not bathe in a tub or swim for the first 2 week following your procedure or if you have any open wounds  It is normal to have some bruising, swelling or discoloration around the procedure site  IF increasing redness, pain, or a bulge develops, call our office immediately  If present, you may remove the band-aid or steri-strips over your procedure site after two days  If you notice any active bleeding at the site, apply pressure to the site and call our office ([phone]) or [phone]  FOLLOW UP STUDIES:  Your doctor will discuss whether further treatments or follow-up studies are necessary at your first post procedure visit      FOLLOW UP APPOINTMENTS:  Making and keeping follow up appointments and ultrasound tests are important to your recovery  If you have difficulty making it to or keeping your follow up appointments, call the office  If you have increased pain, fever >101 5, increased drainage, redness or a bad smell at your surgery site, new coldness/numbness of your arm or leg, please call us immediately and GO directly to the ER  PLEASE CALL THE OFFICE IF YOU HAVE ANY QUESTIONS  [phone]  [phone] [address] [facility] , [address], [place], [address]  [address], [address], [place], [address]  [address], [facility], [address] [facility]  [address] [facility], [address] [facility], [place] [place], [address]    [address], [address] N[address]  [facility], [address] [facility], [address]  [address] [facility], [address] [facility], [address]  [address] [facility], [address] [facility], [address] [facility], Marilee [address]  [address] [facility] [place], [address], [address]  [address] [place], [place] [address]

## 2022-08-15 NOTE — ANESTHESIA PREPROCEDURE EVALUATION
Procedure:  ARTERIOGRAM; right lower extremity angiogram, left femoral access (Right Abdomen)    Relevant Problems   CARDIO   (+) PAD (peripheral artery disease) (HCC)      MUSCULOSKELETAL   (+) Arthritis   (+) DJD (degenerative joint disease) of cervical spine      PULMONARY   (+) COPD (chronic obstructive pulmonary disease) (HCC)      Other   (+) Bilateral edema of lower extremity   (+) Morbid obesity (HCC)      Normal sinus rhythm  Left axis deviation  Low voltage QRS  Possible Anterolateral infarct , age undetermined  Abnormal ECG  No previous ECGs available  Confirmed by Heber Najera ([id number]) on 8/12/2022 4:21:46 PM  Physical Exam    Airway    Mallampati score: II  TM Distance: >3 FB  Neck ROM: full     Dental   No notable dental hx     Cardiovascular  Cardiovascular exam normal    Pulmonary  Pulmonary exam normal Breath sounds clear to auscultation,     Other Findings        Anesthesia Plan  ASA Score- 3     Anesthesia Type- IV sedation with anesthesia with ASA Monitors  Additional Monitors:   Airway Plan:           Plan Factors-    Chart reviewed  EKG reviewed  Imaging results reviewed  Existing labs reviewed  Patient summary reviewed  Patient is not a current smoker  Patient instructed to abstain from smoking on day of procedure  Patient did not smoke on day of surgery  Induction- intravenous  Postoperative Plan- Plan for postoperative opioid use  Informed Consent- Anesthetic plan and risks discussed with patient  I personally reviewed this patient with the CRNA  Discussed and agreed on the Anesthesia Plan with the CRNA             Lab Results   Component Value Date    WBC 5 98 06/22/2022    HGB 12 8 06/22/2022    HCT 40 7 06/22/2022    MCV 91 06/22/2022     06/22/2022     Lab Results   Component Value Date    CALCIUM 9 4 06/22/2022    K 3 3 (L) 06/22/2022    CO2 31 06/22/2022     06/22/2022    BUN 13 06/22/2022    CREATININE 0 59 (L) 06/22/2022     Lab Results Component Value Date    INR 0 87 06/22/2022    PROTIME 11 9 06/22/2022       Discussed with pt the benefits/alternatives and risks or General Anesthesia including breathing tube remaining in place if not strong enough, PONV, damage to lips and teeth, sore throat, eye injury or blindness    I, Dr Margarita Moscoso, the attending physician, have personally seen and evaluated the patient prior to anesthetic care  I have reviewed the pre-anesthetic record, and other medical records if appropriate to the anesthetic care  If a CRNA is involved in the case, I have reviewed the CRNA assessment, if present, and agree  The patient is in a suitable condition to proceed with my formulated anesthetic plan

## 2022-08-15 NOTE — ANESTHESIA POSTPROCEDURE EVALUATION
Post-Op Assessment Note    CV Status:  Stable    Pain management: adequate     Mental Status:  Alert and awake   Hydration Status:  Euvolemic   PONV Controlled:  Controlled   Airway Patency:  Patent      Post Op Vitals Reviewed: Yes      Staff: CRNA         No complications documented      /75    Temp 97 8 °F (36 6 °C) (08/15/22 1212)    Pulse 55   Resp 15 (08/15/22 1221)    SpO2 93 % (08/15/22 1221)

## 2022-08-23 RX ORDER — DULOXETIN HYDROCHLORIDE 20 MG/1
CAPSULE, DELAYED RELEASE ORAL
COMMUNITY
Start: 2022-06-16

## 2022-08-23 RX ORDER — DIAZEPAM 2 MG/1
2 TABLET ORAL
COMMUNITY
Start: 2022-07-12

## 2022-08-24 ENCOUNTER — OFFICE VISIT (OUTPATIENT)
Dept: VASCULAR SURGERY | Facility: CLINIC | Age: 72
End: 2022-08-24
Payer: COMMERCIAL

## 2022-08-24 ENCOUNTER — TELEPHONE (OUTPATIENT)
Dept: VASCULAR SURGERY | Facility: CLINIC | Age: 72
End: 2022-08-24

## 2022-08-24 VITALS
WEIGHT: 186.4 LBS | SYSTOLIC BLOOD PRESSURE: 148 MMHG | HEART RATE: 72 BPM | TEMPERATURE: 98.6 F | HEIGHT: 61 IN | DIASTOLIC BLOOD PRESSURE: 86 MMHG | BODY MASS INDEX: 35.19 KG/M2 | RESPIRATION RATE: 18 BRPM

## 2022-08-24 DIAGNOSIS — I73.9 PAD (PERIPHERAL ARTERY DISEASE) (HCC): Primary | ICD-10-CM

## 2022-08-24 DIAGNOSIS — S85.001S INJURY OF RIGHT POPLITEAL ARTERY, SEQUELA: ICD-10-CM

## 2022-08-24 PROCEDURE — 99215 OFFICE O/P EST HI 40 MIN: CPT | Performed by: SURGERY

## 2022-08-24 RX ORDER — PREGABALIN 25 MG/1
CAPSULE ORAL
COMMUNITY
Start: 2022-08-17

## 2022-08-24 NOTE — TELEPHONE ENCOUNTER
This is a reminder; patient is due for 1 yr ov res rev w/ CASH due in November   Please call patient and schedule the following by the dates provided  Patient's appointment(s) are due on or after 08/24/2023  Dopplers  [] Abdominal Aorta Iliac (AOIL)  [] Carotid (CV)   [] Celiac and/or Mesenteric  [] Endovascular Aortic Repair (EVAR)   [] Hemodialysis Access (HD)   [x] Lower Limb Arterial (CASH) Per patient would like to schedule herself   Due in November  [] Lower Limb Venous Duplex with Reflux (LEVDR)  [] Renal Artery  [] Upper Limb Arterial (UEA)    [] Upper Limb Venous (UEV)    Advanced Imaging   [] CTA abdomen    [] CTA abdomen & pelvis    [] CT abdomen with/ without contrast  [] CT abdomen with contrast  [] CT abdomen without contrast    [] CT abdomen & pelvis with/ without contrast  [] CT abdomen & pelvis with contrast  [] CT abdomen & pelvis without contrast    Office Visit   [] New patient, patient last seen over 3 years ago  [] Risk factor modification (RFM)   [x] Follow up  due on or after 08/24/2023

## 2022-08-24 NOTE — PROGRESS NOTES
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  She is s/p viabahn stent placement  (11/15)  PAD (peripheral artery disease) (HCC)  Injury of right popliteal artery, sequela  -     VAS lower limb arterial duplex, complete bilateral; Future  -s/p R viabahn stent placement for bleeding pseudoaneurysm as complication of R knee [id number] 11/22  -s/p procedure in [place] where it sounds like they did a RLE agram and "cleaned things out put want to do the same thing to clean it out more"  -reviewed LEADs which reports >75% stenosis at the distal stent but ABIs remain wnl; this is increased from prior  -patient continues to be asymptomatic with palpable pulses  -performed RLE angiogram w/ moderate instent restenosis at the proximal and distal aspects treated with 4mm PTA with good result; 3-vessel runoff; now 2+ DP and PT pulses  -will repeat LEADs in 3 months     Medications  -cont ASA      Subjective:      Patient ID: Will Amaral is a 70 y o  female  Patient is s/p RLE agram; Lt Fem access on 8/12/22 by Dr Lorenzo Hernandez Doctor  Pt has a lump on the Lt side of her abdomen and LLQ bruising  Pt denies new numbness, tingling, coldness, or open wounds  Pt denies claudication or rest pain  Pt is on ASA 81 mg  HPI:    Patient known to me from '13 when I placed a viabahn stent for bleeding PSA at [facility] after injury from TKA  At last visit, she had moved to [place] to be closer to her twin boys and grandchildren but now moved back  She reports she had a procedure while in [facility] to work on the right leg where he "opened things up" and that he said he wants to repeat the procedure to "open it up some more"  It sounds like she had an access site hematoma after that procedure    She denies symptoms including claudication, rest pain, wounds    Continues to complain of generalized muscle cramps, mainly at night affecting her thighs, pelvis, legs, etc   These are not related to activity  Denies calf cramping with walking or short inclines  Continues ASA daily  Since angiogram, she has significant bruising along the lower abdominal wall and vulva which is sore but resolving  The following portions of the patient's history were reviewed and updated as appropriate: allergies, current medications, past family history, past medical history, past social history, past surgical history and problem list     Review of Systems   Constitutional: Negative  HENT: Negative  Eyes: Negative  Respiratory: Negative  Cardiovascular: Negative  Gastrointestinal: Negative  Endocrine: Negative  Genitourinary: Negative  Musculoskeletal: Positive for arthralgias, back pain, joint swelling and myalgias  Skin: Negative  Allergic/Immunologic: Negative  Neurological: Negative  Hematological: Bruises/bleeds easily  Psychiatric/Behavioral: Negative  Objective:      /86 (BP Location: Left arm, Patient Position: Sitting)   Pulse 72   Temp 98 6 °F (37 °C) (Tympanic)   Resp 18   Ht 5' 1" (1 549 m)   Wt 84 6 kg (186 lb 6 4 oz)   BMI 35 22 kg/m²          Physical Exam  Vitals and nursing note reviewed  Constitutional:       Appearance: She is well-developed  HENT:      Head: Normocephalic and atraumatic  Eyes:      Conjunctiva/sclera: Conjunctivae normal    Cardiovascular:      Rate and Rhythm: Normal rate and regular rhythm  Pulses:           Radial pulses are 2+ on the right side and 2+ on the left side  Dorsalis pedis pulses are 2+ on the right side and 2+ on the left side  Posterior tibial pulses are 2+ on the right side and 2+ on the left side  Heart sounds: Normal heart sounds  No murmur heard       Comments: No carotid bruits    L groin w/ mild tenderness, no expansile pulsation  L lower abdomen and vulva w/ ecchymosis, resolving  Pulmonary:      Effort: Pulmonary effort is normal  No respiratory distress  Breath sounds: Normal breath sounds  No wheezing  Abdominal:      General: There is no distension  Palpations: Abdomen is soft  Tenderness: There is no abdominal tenderness  There is no rebound  Musculoskeletal:         General: Normal range of motion  Cervical back: Normal range of motion and neck supple  Right lower le+ Edema present  Left lower le+ Edema present  Skin:     General: Skin is warm and dry  Neurological:      Mental Status: She is alert and oriented to person, place, and time  Psychiatric:         Behavior: Behavior normal            I have reviewed and made appropriate changes to the review of systems input by the medical assistant  Vitals:    22 0957   BP: 148/86   BP Location: Left arm   Patient Position: Sitting   Pulse: 72   Resp: 18   Temp: 98 6 °F (37 °C)   TempSrc: Tympanic   Weight: 84 6 kg (186 lb 6 4 oz)   Height: 5' 1" (1 549 m)       Patient Active Problem List   Diagnosis    DJD (degenerative joint disease) of cervical spine    Arthritis    Bilateral edema of lower extremity    COPD [facility]ch[facility] obstructive [facility] disease) [facility])    PAD [facility]peripheral artery disease) ([facility])    Morbid obesity ([facility])    Popliteal artery injury       Past Surgical History:   Procedure Laterality Date    COLONOSCOPY      GASTROSTOMY      HYSTERECTOMY Bilateral     complete  age 54    IR IVC FILTER REMOVAL  2015    IR LOWER EXTREMITY ANGIOGRAM  8/15/2022    JOINT REPLACEMENT      bilateral knee    LAPAROSCOPIC GASTRIC BANDING      POPLITEAL ARTERY STENT      [place] SLCTV CATHJ 3RD+ ORD SLCTV ABDL PEL/LXTR [facility] Right 8/15/2022    Procedure:  Aortogram RLE runoff L CFA access w/ 6F sheath and mynx closure R popliteal PTA w/ 4x60mm  balloon;  Surgeon: Virginia Shin MD;  [facility];  Service: Vascular    REPLACEMENT TOTAL KNEE      TONSILLECTOMY      US GUIDED VASCULAR ACCESS  12/7/2015       Family History   Problem Relation Age of Onset    Ovarian cancer Mother     No Known Problems Father     Colon cancer Cousin     Cancer Cousin         colon    Cancer Maternal Grandmother     No Known Problems Maternal Grandfather     No Known Problems Paternal Grandmother     No Known Problems Paternal Grandfather     No Known Problems Son     No Known Problems Son     No Known Problems Maternal Aunt     No Known Problems Paternal Aunt        Social History     Socioeconomic History    Marital status: Legally      Spouse name: Not on file    Number of children: Not on file    Years of education: Not on file    Highest education level: Not on file   Occupational History    Not on file   Tobacco Use    Smoking status: Never Smoker    Smokeless tobacco: Never Used   Vaping Use    Vaping Use: Never used   Substance and Sexual Activity    Alcohol use: Never    Drug use: Never    Sexual activity: Not on file   Other Topics Concern    Not on file   Social History Narrative    Not on file     Social Determinants of Health     Financial Resource Strain: Not on file   Food Insecurity: Not on file   Transportation Needs: Not on file   Physical Activity: Not on file   Stress: Not on file   Social Connections: Not on file   Intimate Partner Violence: Not on file   Housing Stability: Not on file       Allergies   Allergen Reactions    Erythromycin Nausea Only     Nausea      Levofloxacin     Codeine Other (See Comments)     RINGING IN EARS AND DIFFICULTY SLEEPING         Current Outpatient Medications:     aspirin (ECOTRIN LOW STRENGTH) 81 mg EC tablet, Take 81 mg by mouth daily, Disp: , Rfl:     Aspirin-Acetaminophen-Caffeine (EXCEDRIN PO), Take by mouth, Disp: , Rfl:     diazepam (VALIUM) 2 mg tablet, Take 2 mg by mouth daily at bedtime, Disp: , Rfl:     DULoxetine (CYMBALTA) 20 mg capsule, , Disp: , Rfl:     esomeprazole (NexIUM) 40 MG capsule, Take by mouth, Disp: , Rfl:     tiotropium (Spiriva HandiHaler) 18 mcg inhalation capsule, Place 18 mcg into inhaler and inhale, Disp: , Rfl:     gabapentin (NEURONTIN) 100 mg capsule, TAKE 1 CAPSULE BY MOUTH THREE TIMES A DAY FOR 30 DAYS (Patient not taking: No sig reported), Disp: , Rfl:     pregabalin (LYRICA) 25 mg capsule, TAKE 1 CAPSULE BY MOUTH EVERY DAY IN THE EVENING FOR 30 DAYS (Patient not taking: Reported on 8/24/2022), Disp: , Rfl:

## 2022-08-24 NOTE — PATIENT INSTRUCTIONS
1) Right popliteal stent  -the ultrasound showed that there is a narrowing at the end of the stent  -we did an angiogram procedure and treated this with a balloon  -we will continue to monitor this area with ultrasound, first in 3 months  -continue your aspirin once daily    2) UTI  Please call your family doctor about your possible UTI

## 2022-11-02 ENCOUNTER — TELEPHONE (OUTPATIENT)
Dept: OTHER | Facility: OTHER | Age: 72
End: 2022-11-02

## 2022-11-03 DIAGNOSIS — K58.0 IRRITABLE BOWEL SYNDROME WITH DIARRHEA: Primary | ICD-10-CM

## 2022-11-03 RX ORDER — CHOLESTYRAMINE 4 G/9G
1 POWDER, FOR SUSPENSION ORAL
Qty: 90 PACKET | Refills: 1 | Status: SHIPPED | OUTPATIENT
Start: 2022-11-03

## 2022-11-03 NOTE — TELEPHONE ENCOUNTER
I lmom for pt to please call back to schedule an appt  Also informed medication was refilled  Will call pt again in one week if do not hear back from her

## 2022-11-25 ENCOUNTER — HOSPITAL ENCOUNTER (OUTPATIENT)
Dept: NON INVASIVE DIAGNOSTICS | Facility: CLINIC | Age: 72
Discharge: HOME | End: 2022-11-25

## 2022-11-25 DIAGNOSIS — S85.001S INJURY OF RIGHT POPLITEAL ARTERY, SEQUELA: ICD-10-CM

## 2022-11-25 DIAGNOSIS — I73.9 PAD (PERIPHERAL ARTERY DISEASE) (HCC): ICD-10-CM

## 2022-11-29 ENCOUNTER — TRANSCRIBE ORDERS (OUTPATIENT)
Dept: VASCULAR SURGERY | Facility: CLINIC | Age: 72
End: 2022-11-29

## 2022-11-29 DIAGNOSIS — I73.9 PAD (PERIPHERAL ARTERY DISEASE) (HCC): Primary | ICD-10-CM

## 2022-12-12 ENCOUNTER — OFFICE VISIT (OUTPATIENT)
Dept: GASTROENTEROLOGY | Facility: CLINIC | Age: 72
End: 2022-12-12

## 2022-12-12 VITALS
SYSTOLIC BLOOD PRESSURE: 170 MMHG | DIASTOLIC BLOOD PRESSURE: 111 MMHG | WEIGHT: 183 LBS | BODY MASS INDEX: 34.55 KG/M2 | HEIGHT: 61 IN | HEART RATE: 90 BPM

## 2022-12-12 DIAGNOSIS — K21.9 GASTROESOPHAGEAL REFLUX DISEASE WITHOUT ESOPHAGITIS: Primary | ICD-10-CM

## 2022-12-12 DIAGNOSIS — K58.0 IRRITABLE BOWEL SYNDROME WITH DIARRHEA: ICD-10-CM

## 2022-12-12 DIAGNOSIS — Z12.11 COLON CANCER SCREENING: ICD-10-CM

## 2022-12-12 DIAGNOSIS — Z86.010 HX OF ADENOMATOUS COLONIC POLYPS: ICD-10-CM

## 2022-12-12 PROBLEM — Z86.0101 HX OF ADENOMATOUS COLONIC POLYPS: Status: ACTIVE | Noted: 2022-12-12

## 2022-12-12 RX ORDER — ALPRAZOLAM 0.25 MG/1
TABLET ORAL
COMMUNITY
Start: 2022-10-21

## 2022-12-12 RX ORDER — ESOMEPRAZOLE MAGNESIUM 40 MG/1
40 CAPSULE, DELAYED RELEASE ORAL DAILY
Qty: 30 CAPSULE | Refills: 3 | Status: SHIPPED | OUTPATIENT
Start: 2022-12-12 | End: 2023-03-12

## 2022-12-12 RX ORDER — CHOLESTYRAMINE 4 G/9G
1 POWDER, FOR SUSPENSION ORAL
Qty: 90 PACKET | Refills: 3 | Status: SHIPPED | OUTPATIENT
Start: 2022-12-12 | End: 2023-03-12

## 2022-12-12 NOTE — PROGRESS NOTES
This is a 27-year-old female who presents our office for follow-up for GERD, irritable bowel syndrome [facility] - Outpatient Follow-up Note  Gamal Velez 67 y o  female MRN: [id number]  Encounter: [id number]          ASSESSMENT AND PLAN:      1  Gastroesophageal reflux disease without esophagitis  Patient has history of GERD  Patient reports symptoms are currently well controlled on Nexium  Patient denies nausea, vomiting, acid reflux, heartburn, epigastric or abdominal pain  - esomeprazole (NexIUM) 40 MG capsule; Take 1 capsule (40 mg total) by mouth in the morning Take 1/2 hour prior to breakfast  Dispense: 30 capsule; Refill: 3  -Continue antireflux diet and measures    2  Irritable bowel syndrome with diarrhea  Patient has history of irritable bowel syndrome with diarrhea  Patient reports that her symptoms are well controlled on current medication of Questran  Patient denies any abdominal pain, blood in stool, or blood from rectal area  - cholestyramine (QUESTRAN) 4 g packet; Take 1 packet (4 g total) by mouth daily at bedtime  Dispense: 90 packet; Refill: 3    3  Hx of adenomatous colonic polyps  History of tubular adenoma seen on colonoscopy done 10/2021  Patient due for repeat surveillance colonoscopy in 10/2026     4  Colon cancer screening  Up-to-date    5  Hypertension  Patient's blood pressure noted to be elevated in office 170/111  Patient was asymptomatic  Patient denies lightheadedness, dizziness, headaches, or blurred vision     -Patient informed to follow-up with PCP for management of blood pressure  Follow-up in 1 year    ______________________________________________________________________    SUBJECTIVE: This is a 27-year-old female who presents to the office for follow-up for GERD, irritable bowel syndrome diarrhea, history of 2 tubular adenoma polyps and colon cancer screening  Patient currently denies any GI symptoms    Patient denies nausea, vomiting, acid reflux, dysphagia, epigastric or abdominal pain  Patient denies blood in stool, blood from rectal area, or black tarry stool  Abdomen exam benign no abdominal tenderness or guarding  Patient reports bowel patterns are regular with use of Questran daily at bedtime  Patient reports that GERD symptoms are controlled with use of Nexium daily  Results most recent colonoscopy and biopsies reviewed with patient  Colonoscopy done 10/7/2021 showed colon polyps x2 removed  Normal colonic mucosa  Multiple small to medium size diverticula throughout the colon  Biopsies showed polyps were tubular adenomas  Patient does not smoke  Positive family history colon cancer cousin  REVIEW OF SYSTEMS IS OTHERWISE NEGATIVE  Historical Information   Past Medical History:   Diagnosis Date   • Arthritis    • Chronic pain disorder    • Colon polyp    • COPD (chronic obstructive pulmonary disease) ([facility] [address] )    • Coronary artery disease    • Fibromyalgia, primary    • GERD (gastroesophageal reflux disease)    • High blood pressure    • History of transfusion    • Hyperlipidemia    • Hypertension    • Irritable bowel syndrome    • PONV (postoperative nausea and vomiting)      Past Surgical History:   Procedure Laterality Date   • COLONOSCOPY     • GASTROSTOMY     • HYSTERECTOMY Bilateral     complete  age 54   • IR IVC FILTER REMOVAL  12/7/2015   • IR LOWER EXTREMITY ANGIOGRAM  8/15/2022   • JOINT REPLACEMENT      bilateral knee   • LAPAROSCOPIC GASTRIC BANDING     • POPLITEAL ARTERY STENT     • [place] SLCTV CATHJ 3RD+ ORD SLCTV ABDL PEL/LXTR [facility] Right 8/15/2022    Procedure:  Aortogram RLE runoff L CFA access w/ 6F sheath and mynx closure R popliteal PTA w/ 4x60mm  balloon;  Surgeon: Nuvia Shin MD;  Location: [facility];  Service: Vascular   • REPLACEMENT TOTAL KNEE     • [facility]     [facility]     Social History   Social History     Substance and Sexual Activity   Alcohol Use Never     Social History     Substance and Sexual Activity   Drug Use Never     Social History     Tobacco Use   Smoking Status Never   Smokeless Tobacco Never     Family History   Problem Relation Age of Onset   • Ovarian cancer Mother    • No Known Problems Father    • Colon cancer Cousin    • Cancer Cousin         colon   • Cancer Maternal Grandmother    • No Known Problems Maternal Grandfather    • No Known Problems Paternal Grandmother    • No Known Problems Paternal Grandfather    • No Known Problems Son    • No Known Problems Son    • No Known Problems Maternal Aunt    • No Known Problems Paternal Aunt        Meds/Allergies       Current Outpatient Medications:   •  aspirin (ECOTRIN LOW STRENGTH) 81 mg EC tablet  •  Aspirin-Acetaminophen-Caffeine (EXCEDRIN PO)  •  cholestyramine (QUESTRAN) 4 g packet  •  diazepam (VALIUM) 2 mg tablet  •  DULoxetine (CYMBALTA) 20 mg capsule  •  esomeprazole (NexIUM) 40 MG capsule  •  gabapentin (NEURONTIN) 100 mg capsule  •  ALPRAZolam (XANAX) 0 25 mg tablet  •  tiotropium (Spiriva HandiHaler) 18 mcg inhalation capsule    Allergies   Allergen Reactions   • Erythromycin Nausea Only     Nausea     • Levofloxacin    • Codeine Other (See Comments)     RINGING IN EARS AND DIFFICULTY SLEEPING           Objective     Blood pressure (!) 170/111, pulse 90, height 5' 1" (1 549 m), weight 83 kg (183 lb)  Body mass index is 34 58 kg/m²  PHYSICAL EXAM:      General Appearance:   Alert, cooperative, no distress   HEENT:   Normocephalic, atraumatic, anicteric      Neck:  Supple, symmetrical, trachea midline   Lungs:   Clear to auscultation bilaterally; no rales, rhonchi or wheezing; respirations unlabored    Heart[de-identified]   Regular rate and rhythm; no murmur, rub, or gallop     Abdomen:   Soft, non-tender, non-distended; normal bowel sounds; no masses, no organomegaly    Genitalia:   Deferred    Rectal:   Deferred    Extremities:  No cyanosis, clubbing or edema    Pulses:  2+ and symmetric  Skin:  No jaundice, rashes, or lesions    Lymph nodes:  No palpable cervical lymphadenopathy        Lab Results:   No visits with results within 1 Day(s) from this visit  Latest known visit with results is:   Admission on 08/12/2022, Discharged on 08/12/2022   Component Date Value   • Ventricular Rate 08/12/2022 66    • Atrial Rate 08/12/2022 66    • ME Interval 08/12/2022 192    • QRSD Interval 08/12/2022 74    • QT Interval 08/12/2022 412    • QTC Interval 08/12/2022 431    • P Axis 08/12/2022 31    • QRS Axis 08/12/2022 -40    • T Wave Axis 08/12/2022 1          Radiology Results:   VAS lower limb arterial duplex, complete bilateral    Result Date: 11/28/2022  Narrative:  [facility] REPORT CLINICAL: Indications: Initial Post-op evaluation s/p revascularization  Patient is asymptomatic at this time  Operative History: 2022-08-15 Right Popliteal artery angioplasty 2015-12-07 IVC filter removal Right Popliteal artery stent 11/2015 TKR Risk Factors The patient has history of HTN, Hyperlipidemia, PAD and CAD  Clinical Right Pressure:  150/ mm Hg, Left Pressure:  153/ mm Hg  FINDINGS:  Segment                Right                        Left                                          Impression  PSV (cm/s)  EDV  PSV (cm/s)  Common Femoral Artery                      85    0          75  Prox Profunda                              66    0          49  Prox SFA                                   81    0          85  Mid SFA                                    84               94  Dist SFA                                   40               48  Proximal Pop                               41               45  Distal Pop                                                  64  Distal Pop - Stent     50-75%             213                   Tibioperoneal                             114               65  Dist Post Tibial                           53               70  Dist  Ant   Tibial                          75 68  Dist Peroneal                              28 28     CONCLUSION: Impression: RIGHT LOWER LIMB: There is evidence of 50-75% stenosis in the distal popliteal artery stent s/p angioplasty  Ankle/Brachial index: 1 12 which is in the normal category, (Prior 1 10)  PVR/ PPG tracings are normal  Triphasic waveforms noted at the ankle  Metatarsal pressure 125 mm Hg, (Prior: 172 mm Hg)  Great toe pressure 106 mm Hg, within the healing range (Prior: 124 mm Hg)  LEFT LOWER LIMB: This resting evaluation shows no evidence of significant lower extremity arterial occlusive disease  Ankle/Brachial index: 1 10 which is in the normal category, (Prior: 1 14)  PVR/ PPG tracings are normal  Triphasic waveforms noted at the ankle  Metatarsal pressure 160 mm Hg, (Prior: 146 mm Hg)  Great toe pressure 92 mm Hg, within the healing range (Prior: 124 mm Hg)  In comparison to the study of 5/10/2022, the right popliteal artery and stent is patent with a 50-75% stenosis noted s/p angioplasty    SIGNATURE: Electronically Signed by: Tiffany King MD on 2022-11-28 02:25:00 PM

## 2023-02-10 PROBLEM — Z12.11 COLON CANCER SCREENING: Status: RESOLVED | Noted: 2022-12-12 | Resolved: 2023-02-10

## 2023-03-03 ENCOUNTER — OFFICE VISIT (OUTPATIENT)
Dept: URGENT CARE | Facility: CLINIC | Age: 73
End: 2023-03-03

## 2023-03-03 VITALS
SYSTOLIC BLOOD PRESSURE: 138 MMHG | RESPIRATION RATE: 18 BRPM | BODY MASS INDEX: 37.16 KG/M2 | HEART RATE: 77 BPM | DIASTOLIC BLOOD PRESSURE: 64 MMHG | HEIGHT: 61 IN | WEIGHT: 196.8 LBS | TEMPERATURE: 98.3 F | OXYGEN SATURATION: 97 %

## 2023-03-03 DIAGNOSIS — N30.01 ACUTE CYSTITIS WITH HEMATURIA: Primary | ICD-10-CM

## 2023-03-03 DIAGNOSIS — R31.9 HEMATURIA, UNSPECIFIED TYPE: ICD-10-CM

## 2023-03-03 LAB
SL AMB  POCT GLUCOSE, UA: NEGATIVE
SL AMB LEUKOCYTE ESTERASE,UA: ABNORMAL
SL AMB POCT BILIRUBIN,UA: NEGATIVE
SL AMB POCT BLOOD,UA: ABNORMAL
SL AMB POCT CLARITY,UA: ABNORMAL
SL AMB POCT COLOR,UA: ABNORMAL
SL AMB POCT KETONES,UA: NEGATIVE
SL AMB POCT NITRITE,UA: NEGATIVE
SL AMB POCT PH,UA: 6
SL AMB POCT SPECIFIC GRAVITY,UA: 1
SL AMB POCT URINE PROTEIN: NEGATIVE
SL AMB POCT UROBILINOGEN: 0.2

## 2023-03-03 RX ORDER — CIPROFLOXACIN 500 MG/1
500 TABLET, FILM COATED ORAL EVERY 12 HOURS SCHEDULED
Qty: 14 TABLET | Refills: 0 | Status: SHIPPED | OUTPATIENT
Start: 2023-03-03 | End: 2023-03-10

## 2023-03-03 NOTE — PROGRESS NOTES
[address] [employer]        NAME: Yoni Ramos is a 67 y o  female  : 1950    MRN: [id number]  DATE: March 3, 2023  TIME: 2:45 PM    Assessment and Plan   Acute cystitis with hematuria [N30 01]  1  Acute cystitis with hematuria  ciprofloxacin (CIPRO) 500 mg tablet      2  Hematuria, unspecified type  POCT urine dip    Urine culture            Patient Instructions       Follow up with PCP in 3-5 days  Proceed to  ER if symptoms worsen  Chief Complaint     Chief Complaint   Patient presents with   • Possible UTI     Pt complaining of a UTI  Symptoms started last Friday  Pt symptoms blood clots, blood and sharp burring pain and having difficult time urinating  Pt was taking OTC Med's but not helping  History of Present Illness       Patient is a 58-year-old female presenting with several days of dysuria, lower abdominal discomfort, lower back pain, and chills  Denies fever  She reports frequent urinary tract infections  States the symptoms are similar  Last infection was in December  She normally takes Cipro without any problems  Review of Systems   Review of Systems   Constitutional: Negative for activity change, chills and fever  Gastrointestinal: Positive for abdominal pain  Genitourinary: Positive for dysuria and frequency  Musculoskeletal: Positive for back pain  All other systems reviewed and are negative          Current Medications       Current Outpatient Medications:   •  ciprofloxacin (CIPRO) 500 mg tablet, Take 1 tablet (500 mg total) by mouth every 12 (twelve) hours for 7 days, Disp: 14 tablet, Rfl: 0  •  diazepam (VALIUM) 2 mg tablet, Take 2 mg by mouth daily at bedtime, Disp: , Rfl:   •  DULoxetine (CYMBALTA) 20 mg capsule, , Disp: , Rfl:   •  esomeprazole (NexIUM) 40 MG capsule, Take 1 capsule (40 mg total) by mouth in the morning Take 1/2 hour prior to breakfast, Disp: 30 capsule, Rfl: 3  •  gabapentin (NEURONTIN) 100 mg capsule, TAKE 1 CAPSULE BY MOUTH THREE TIMES A DAY FOR 30 DAYS, Disp: , Rfl:   •  ALPRAZolam (XANAX) 0 25 mg tablet, , Disp: , Rfl:   •  aspirin (ECOTRIN LOW STRENGTH) 81 mg EC tablet, Take 81 mg by mouth daily, Disp: , Rfl:   •  Aspirin-Acetaminophen-Caffeine (EXCEDRIN PO), Take by mouth, Disp: , Rfl:   •  cholestyramine (QUESTRAN) 4 g packet, Take 1 packet (4 g total) by mouth daily at bedtime, Disp: 90 packet, Rfl: 3  •  tiotropium (Spiriva HandiHaler) 18 mcg inhalation capsule, Place 18 mcg into inhaler and inhale, Disp: , Rfl:     Current Allergies     Allergies as of 03/03/2023 - Reviewed 03/03/2023   Allergen Reaction Noted   • Erythromycin Nausea Only 06/16/2017   • Levofloxacin  01/25/2016   • Codeine Other (See Comments) 04/19/2018            The following portions of the patient's history were reviewed and updated as appropriate: allergies, current medications, past family history, past medical history, past social history, past surgical history and problem list      Past Medical History:   Diagnosis Date   • Arthritis    • Chronic pain disorder    • Colon polyp    • COPD (chronic obstructive pulmonary disease) ([facility] [address] )    • Coronary artery disease    • Fibromyalgia, primary    • GERD (gastroesophageal reflux disease)    • High blood pressure    • History of transfusion    • Hyperlipidemia    • Hypertension    • Irritable bowel syndrome    • PONV (postoperative nausea and vomiting)        Past Surgical History:   Procedure Laterality Date   • COLONOSCOPY     • GASTROSTOMY     • HYSTERECTOMY Bilateral     complete  age 54   • IR IVC FILTER REMOVAL  12/7/2015   • IR LOWER EXTREMITY ANGIOGRAM  8/15/2022   • JOINT REPLACEMENT      bilateral knee   • LAPAROSCOPIC GASTRIC BANDING     • POPLITEAL ARTERY STENT     • [place] SLCTV CATHJ 3RD+ ORD SLCTV ABDL PEL/LXTR [facility] Right 8/15/2022    Procedure:  Aortogram RLE runoff L CFA access w/ 6F sheath and mynx closure R popliteal PTA w/ 4x60mm  balloon;  Surgeon: Nicky Anne MD;  Location: [facility]; Service: Vascular   • REPLACEMENT TOTAL KNEE     • TONSILLECTOMY     • US GUIDED VASCULAR ACCESS  12/7/2015       Family History   Problem Relation Age of Onset   • Ovarian cancer Mother    • No Known Problems Father    • Colon cancer Cousin    • Cancer Cousin         colon   • Cancer Maternal Grandmother    • No Known Problems Maternal Grandfather    • No Known Problems Paternal Grandmother    • No Known Problems Paternal Grandfather    • No Known Problems Son    • No Known Problems Son    • No Known Problems Maternal Aunt    • No Known Problems Paternal Aunt          Medications have been verified  Objective   /64   Pulse 77   Temp 98 3 °F (36 8 °C)   Resp 18   Ht 5' 1" (1 549 m)   Wt 89 3 kg (196 lb 12 8 oz)   SpO2 97%   BMI 37 19 kg/m²        Component 3/3/23 1409    LEUKOCYTE ESTERASE,UA large    NITRITE,UA negative    SL AMB POCT UROBILINOGEN 0 2    POCT URINE PROTEIN negative     PH,UA 6 0    BLOOD,UA large    SPECIFIC GRAVITY,UA 1 005    KETONES,UA negative    BILIRUBIN,UA negative    GLUCOSE, UA negative     COLOR,UA octavio    CLARITY,UA hazy        Physical Exam     Physical Exam  Vitals reviewed  Constitutional:       General: She is awake  She is not in acute distress  Appearance: Normal appearance  HENT:      Head: Normocephalic  Right Ear: Hearing normal       Left Ear: Hearing normal    Cardiovascular:      Rate and Rhythm: Normal rate  Pulmonary:      Effort: Pulmonary effort is normal    Neurological:      General: No focal deficit present  Mental Status: She is alert and oriented to person, place, and time  Psychiatric:         Behavior: Behavior is cooperative

## 2023-03-04 LAB — BACTERIA UR CULT: NORMAL

## 2023-05-12 DIAGNOSIS — K21.9 GASTROESOPHAGEAL REFLUX DISEASE WITHOUT ESOPHAGITIS: ICD-10-CM

## 2023-05-12 RX ORDER — ESOMEPRAZOLE MAGNESIUM 40 MG/1
CAPSULE, DELAYED RELEASE ORAL
Qty: 90 CAPSULE | Refills: 3 | Status: SHIPPED | OUTPATIENT
Start: 2023-05-12

## 2023-06-05 ENCOUNTER — TELEPHONE (OUTPATIENT)
Dept: NEPHROLOGY | Facility: CLINIC | Age: 73
End: 2023-06-05

## 2023-06-05 NOTE — TELEPHONE ENCOUNTER
New Patient Intake Form   Patient Details   Julia Joiner     1950     [id number]     Insurance Information   Name of Jolly [address]    Does the patient need an insurance referral? no   If patient has Pitney Chery, please ask if they will be using their Imer Gottlieb  Appointment Information   Who is calling to schedule? If not patient, what is callers name? Patient   Referring Provider  self   Reason for Appt (Diagnosis) Kidney stone prevention     Does Patient have labs/urine done at [place] [address]? If not, where do they go? List the date of last lab / urine  *Please try to get labs 2 years back if not at [facility] ([facility]) No [facility] May 2023   Has patient been hospitalized recently? If yes, list name and location of hospital they were in no   Has patient been seen by a Nephrologist before? If yes, list name, location and phone number no   Has the patient had renal imaging done? If so, list the most recent date and type of imaging yes [facility] 5/2023  CT   Does patient have a history of Kidney Stones?  yes   Appointment Details   Is there a referral on file? no    Appointment Date 8/29    Location  [place]   Miscellaneous

## 2023-06-14 ENCOUNTER — HOSPITAL ENCOUNTER (OUTPATIENT)
Dept: NON INVASIVE DIAGNOSTICS | Facility: CLINIC | Age: 73
Discharge: HOME | End: 2023-06-14
Payer: COMMERCIAL

## 2023-06-14 DIAGNOSIS — I73.9 PAD (PERIPHERAL ARTERY DISEASE) (HCC): ICD-10-CM

## 2023-06-14 PROCEDURE — 93925 LOWER EXTREMITY STUDY: CPT | Performed by: SURGERY

## 2023-06-14 PROCEDURE — 93923 UPR/LXTR ART STDY 3+ LVLS: CPT

## 2023-06-14 PROCEDURE — 93925 LOWER EXTREMITY STUDY: CPT

## 2023-06-14 PROCEDURE — 93923 UPR/LXTR ART STDY 3+ LVLS: CPT | Performed by: SURGERY

## 2023-06-21 RX ORDER — DIAZEPAM 5 MG/1
TABLET ORAL
COMMUNITY
Start: 2023-05-19

## 2023-06-22 ENCOUNTER — OFFICE VISIT (OUTPATIENT)
Dept: VASCULAR SURGERY | Facility: CLINIC | Age: 73
End: 2023-06-22
Payer: COMMERCIAL

## 2023-06-22 VITALS
DIASTOLIC BLOOD PRESSURE: 82 MMHG | HEIGHT: 61 IN | WEIGHT: 180 LBS | BODY MASS INDEX: 33.99 KG/M2 | SYSTOLIC BLOOD PRESSURE: 122 MMHG | HEART RATE: 76 BPM | RESPIRATION RATE: 18 BRPM

## 2023-06-22 DIAGNOSIS — I73.9 PAD (PERIPHERAL ARTERY DISEASE) (HCC): Primary | ICD-10-CM

## 2023-06-22 DIAGNOSIS — S85.001S INJURY OF RIGHT POPLITEAL ARTERY, SEQUELA: ICD-10-CM

## 2023-06-22 PROCEDURE — 99213 OFFICE O/P EST LOW 20 MIN: CPT | Performed by: SURGERY

## 2023-06-22 RX ORDER — VALSARTAN 80 MG/1
80 TABLET ORAL DAILY
COMMUNITY

## 2023-06-22 NOTE — PROGRESS NOTES
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  She is s/p viabahn stent placement  (11/15), s/p angiogram w/ R pop PTA 8/15/22    PAD (peripheral artery disease) (HCC)  Injury of right popliteal artery, sequela  -s/p R viabahn stent placement for bleeding pseudoaneurysm as complication of R knee [id number] 54/38  -s/p procedure in [place] where it sounds like they did a RLE agram and \"cleaned things out put want to do the same thing to clean it out more\"  -reviewed LEADs which reports >75% stenosis at the distal stent but ABIs remain wnl; this is increased from prior  -performed RLE angiogram w/ moderate instent restenosis at the proximal and distal aspects treated with 4mm PTA with good result; 3-vessel runoff; now 2+ DP and PT pulses  -reviewed LEADs which show R: 1 04/122/111 and L: 1 14/114/126 w/ 50-75% stenosis of the distal pop stent, stable from prior  -patient continues to be asymptomatic with palpable pulses  -will repeat LEADs in 12 months     Medications  -cont ASA    Subjective:      Patient ID: Danis Abrams is a 67 y o  female  Patient is here to review a CASH on 6/14/23  Pt denies claudication, rest pain, or non-healing wounds  Pt has a stent in the RLE  Pt is on ASA 81 mg  HPI:    Patient known to me from '13 when I placed a viabahn stent for bleeding PSA at [facility] after injury from TKA  She had moved to [place] to be closer to her twin boys and grandchildren but now moved back  She reports she had a procedure while in [facility] to work on the right leg where he \"opened things up\" and that he said he wants to repeat the procedure to \"open it up some more\"  It sounds like she had an access site hematoma after that procedure    She denies symptoms including claudication, rest pain, wounds    Continues to complain of generalized muscle cramps, mainly at night affecting her thighs, pelvis, " "legs, etc   She has nighttime leg pain relieved by bengay  ; These are not related to activity  Denies calf cramping with walking or short inclines  She has DDD of her c-spine and lumbar back with sciatica  Last month, she saw urology for frequent UTIs and hemattura and had cystoscopy resulting in infection? ? LVH    Continues ASA daily  The following portions of the patient's history were reviewed and updated as appropriate: allergies, current medications, past family history, past medical history, past social history, past surgical history and problem list     Review of Systems   Musculoskeletal: Positive for arthralgias and back pain  Negative for gait problem  Skin: Negative  Objective:      /82 (BP Location: Left arm, Patient Position: Sitting)   Pulse 76   Resp 18   Ht 5' 1\" (1 549 m)   Wt 81 6 kg (180 lb)   BMI 34 01 kg/m²          Physical Exam  Vitals and nursing note reviewed  Constitutional:       Appearance: She is well-developed  HENT:      Head: Normocephalic and atraumatic  Eyes:      Conjunctiva/sclera: Conjunctivae normal    Cardiovascular:      Rate and Rhythm: Normal rate and regular rhythm  Pulses:           Radial pulses are 2+ on the right side and 2+ on the left side  Dorsalis pedis pulses are 2+ on the right side and 2+ on the left side  Posterior tibial pulses are 1+ on the right side and 0 on the left side  Heart sounds: Normal heart sounds  No murmur heard  Comments: No carotid bruits  Pulmonary:      Effort: Pulmonary effort is normal  No respiratory distress  Breath sounds: Normal breath sounds  No wheezing  Abdominal:      General: There is no distension  Palpations: Abdomen is soft  Tenderness: There is no abdominal tenderness  There is no rebound  Musculoskeletal:         General: Normal range of motion  Cervical back: Normal range of motion and neck supple  Right lower le+ Edema present   " "     Left lower le+ Edema present  Skin:     General: Skin is warm and dry  Neurological:      Mental Status: She is alert and oriented to person, place, and time  Psychiatric:         Behavior: Behavior normal            I have reviewed and made appropriate changes to the review of systems input by the medical assistant  Vitals:    23 1537   BP: 122/82   BP Location: Left arm   Patient Position: Sitting   Pulse: 76   Resp: 18   Weight: 81 6 kg (180 lb)   Height: 5' 1\" (1 549 m)       Patient Active Problem List   Diagnosis   • DJD (degenerative joint disease) of cervical spine   • Arthritis   • Bilateral edema of lower extremity   • COPD (chronic obstructive pulmonary disease) ([facility])   • PAD (peripheral artery disease) ([facility])   • Morbid obesity ([facility])   • Popliteal artery injury   • Gastroesophageal reflux disease without esophagitis   • Irritable bowel syndrome with diarrhea   • Hx of adenomatous colonic polyps       Past Surgical History:   Procedure Laterality Date   • COLONOSCOPY     • GASTROSTOMY     • HYSTERECTOMY Bilateral     complete  age 54   • IR IVC FILTER REMOVAL  2015   • IR LOWER EXTREMITY ANGIOGRAM  8/15/2022   • JOINT REPLACEMENT      bilateral knee   • LAPAROSCOPIC GASTRIC BANDING     • POPLITEAL ARTERY STENT     • DE SLCTV CATHJ 3RD+ ORD SLCTV ABDL PEL/LXTR [facility] Right 8/15/2022    Procedure:  Aortogram RLE runoff L CFA access w/ 6F sheath and mynx closure R popliteal PTA w/ 4x60mm  balloon;  Surgeon: Aries Shin MD;  [facility];  Service: Vascular   • REPLACEMENT TOTAL KNEE     • TONSILLECTOMY     • US GUIDED VASCULAR ACCESS  2015       Family History   Problem Relation Age of Onset   • Ovarian cancer Mother    • No Known Problems Father    • Colon cancer Cousin    • Cancer Cousin         colon   • Cancer Maternal Grandmother    • No Known Problems Maternal Grandfather    • No Known Problems Paternal Grandmother    • No Known Problems Paternal " Grandfather    • No Known Problems Son    • No Known Problems Son    • No Known Problems Maternal Aunt    • No Known Problems Paternal Aunt        Social History     Socioeconomic History   • Marital status: Legally      Spouse name: Not on file   • Number of children: Not on file   • Years of education: Not on file   • Highest education level: Not on file   Occupational History   • Not on file   Tobacco Use   • Smoking status: Never   • Smokeless tobacco: Never   Vaping Use   • Vaping Use: Never used   Substance and Sexual Activity   • Alcohol use: Never   • Drug use: Never   • Sexual activity: Not on file   Other Topics Concern   • Not on file   Social History Narrative   • Not on file     Social Determinants of Health     Financial Resource Strain: Not on file   Food Insecurity: Not on file   Transportation Needs: Not on file   Physical Activity: Not on file   Stress: Not on file   Social Connections: Not on file   Intimate Partner Violence: Not on file   Housing Stability: Not on file       Allergies   Allergen Reactions   • Erythromycin Nausea Only     Nausea     • Levofloxacin    • Codeine Other (See Comments)     RINGING IN EARS AND DIFFICULTY SLEEPING         Current Outpatient Medications:   •  ALPRAZolam (XANAX) 0 25 mg tablet, , Disp: , Rfl:   •  aspirin (ECOTRIN LOW STRENGTH) 81 mg EC tablet, Take 81 mg by mouth daily, Disp: , Rfl:   •  Aspirin-Acetaminophen-Caffeine (EXCEDRIN PO), Take by mouth, Disp: , Rfl:   •  cholestyramine (QUESTRAN) 4 g packet, Take 1 packet (4 g total) by mouth daily at bedtime, Disp: 90 packet, Rfl: 3  •  diazepam (VALIUM) 2 mg tablet, Take 2 mg by mouth daily at bedtime, Disp: , Rfl:   •  DULoxetine (CYMBALTA) 20 mg capsule, , Disp: , Rfl:   •  esomeprazole (NexIUM) 40 MG capsule, TAKE 1 CAPSULE BY MOUTH IN THE  MORNING TAKE 1/2 HOUR PRIOR TO  BREAKFAST, Disp: 90 capsule, Rfl: 3  •  tiotropium (Spiriva HandiHaler) 18 mcg inhalation capsule, Place 18 mcg into inhaler and inhale, Disp: , Rfl:   •  valsartan (DIOVAN) 80 mg tablet, Take 80 mg by mouth daily, Disp: , Rfl:   •  diazepam (VALIUM) 5 mg tablet, 1/2 TABLET TO 1 TABLET AT BEDTIME (Patient not taking: Reported on 6/22/2023), Disp: , Rfl:   •  gabapentin (NEURONTIN) 100 mg capsule, TAKE 1 CAPSULE BY MOUTH THREE TIMES A DAY FOR 30 DAYS (Patient not taking: Reported on 6/22/2023), Disp: , Rfl:

## 2023-06-22 NOTE — PATIENT INSTRUCTIONS
1) Right popliteal stent  -the ultrasound showed that there is a narrowing at the end of the stent  -we did an angiogram procedure and treated this with a balloon  -you have some mild renarrowing but it is stable and your blood flow down the leg is excellent  -continue your aspirin once daily    2) UTI  -please followup with a urologist; if you would like to see someone new, I think you would really like Dr Rich Forte

## 2023-07-19 ENCOUNTER — OFFICE VISIT (OUTPATIENT)
Dept: OBGYN CLINIC | Facility: CLINIC | Age: 73
End: 2023-07-19
Payer: COMMERCIAL

## 2023-07-19 VITALS
HEIGHT: 61 IN | BODY MASS INDEX: 32.66 KG/M2 | DIASTOLIC BLOOD PRESSURE: 88 MMHG | SYSTOLIC BLOOD PRESSURE: 126 MMHG | WEIGHT: 173 LBS

## 2023-07-19 DIAGNOSIS — L90.0 LICHEN SCLEROSUS ET ATROPHICUS: Primary | ICD-10-CM

## 2023-07-19 PROBLEM — H26.9 CATARACTS, BOTH EYES: Status: ACTIVE | Noted: 2023-04-21

## 2023-07-19 PROBLEM — I10 BENIGN ESSENTIAL HYPERTENSION: Status: ACTIVE | Noted: 2018-04-19

## 2023-07-19 PROBLEM — J40 TRACHEOBRONCHITIS: Status: ACTIVE | Noted: 2023-06-02

## 2023-07-19 PROCEDURE — 99213 OFFICE O/P EST LOW 20 MIN: CPT | Performed by: NURSE PRACTITIONER

## 2023-07-19 RX ORDER — VALSARTAN 160 MG/1
160 TABLET ORAL DAILY
COMMUNITY
Start: 2023-07-07

## 2023-07-19 RX ORDER — CLOBETASOL PROPIONATE 0.5 MG/G
CREAM TOPICAL 2 TIMES DAILY
Qty: 45 G | Refills: 2 | Status: SHIPPED | OUTPATIENT
Start: 2023-07-19

## 2023-07-19 NOTE — PROGRESS NOTES
SUBJECTIVE:     67 y.o. female complains of vaginal itching and burning. The itching is on the outside. Denies vaginal discharge. History of lichen schlerosus - she has not been using the clobetasol. Denies abnormal vaginal bleeding or significant pelvic pain or  fever. No UTI symptoms. She is not sexually active. No LMP recorded. Patient has had a hysterectomy. OBJECTIVE:     Physical Exam  Constitutional:       Appearance: Normal appearance. Genitourinary:      Right Labia: tenderness and skin changes. Left Labia: tenderness and skin changes. No labial fusion noted. Vulva exam comments: The skin of the external genitalia surrounding the labias is thin, erythematous and mildly excoriated consistent with lichen sclerosus. Roslyn Phoenix HENT:      Head: Normocephalic and atraumatic. Musculoskeletal:      Cervical back: Neck supple. Neurological:      Mental Status: She is alert. Skin:     General: Skin is warm. Vitals and nursing note reviewed. ASSESSMENT AND PLAN:     Lupe Larry was seen today for vaginitis. Diagnoses and all orders for this visit:    Lichen sclerosus et atrophicus  -     clobetasol (TEMOVATE) 0.05 % cream; Apply topically 2 (two) times a day      Exam consistent with lichen sclerosus flare-up. Refill of her clobetasol provided. She is to apply a thin layer BID for 7-10 days. Recommended she also hydrate the external genitalia with coconut daily. RTO with no improvement or worsening symptoms.

## 2023-08-01 ENCOUNTER — TELEPHONE (OUTPATIENT)
Dept: NEPHROLOGY | Facility: CLINIC | Age: 73
End: 2023-08-01

## 2023-08-01 NOTE — TELEPHONE ENCOUNTER
LM for pt to r/s 8/29 appt with Dr. Shaista Montano due to change in her schedule. Can be moved to 9/1.

## 2023-09-01 ENCOUNTER — OFFICE VISIT (OUTPATIENT)
Dept: NEPHROLOGY | Facility: CLINIC | Age: 73
End: 2023-09-01
Payer: COMMERCIAL

## 2023-09-01 VITALS
DIASTOLIC BLOOD PRESSURE: 90 MMHG | HEIGHT: 61 IN | WEIGHT: 162.2 LBS | BODY MASS INDEX: 30.62 KG/M2 | SYSTOLIC BLOOD PRESSURE: 130 MMHG

## 2023-09-01 DIAGNOSIS — N20.0 NEPHROLITHIASIS: Primary | ICD-10-CM

## 2023-09-01 DIAGNOSIS — I10 BENIGN ESSENTIAL HYPERTENSION: ICD-10-CM

## 2023-09-01 DIAGNOSIS — E66.09 CLASS 1 OBESITY DUE TO EXCESS CALORIES WITHOUT SERIOUS COMORBIDITY WITH BODY MASS INDEX (BMI) OF 30.0 TO 30.9 IN ADULT: ICD-10-CM

## 2023-09-01 DIAGNOSIS — E87.6 HYPOKALEMIA: ICD-10-CM

## 2023-09-01 PROCEDURE — 99203 OFFICE O/P NEW LOW 30 MIN: CPT | Performed by: STUDENT IN AN ORGANIZED HEALTH CARE EDUCATION/TRAINING PROGRAM

## 2023-09-01 NOTE — PROGRESS NOTES
NEPHROLOGY OUTPATIENT CONSULTATION   Valentin Guadalupe 67 y.o. female MRN: [id number]  Date: 2023  Reason for consultation:   Chief Complaint   Patient presents with   • Consult     Kidney stone prevention       ASSESSMENT AND PLAN:  a 67 y.o woman with P<H hypertension, nephrolithiasis, GERD, COPD, PAD, obesity. Patient is here for initial consultation for nephrolithiasis    PLAN:    #Nephrolithiasis  · Episodes:  · Interventions:  · Imagin mm nonobstructive left kidney stone, no hydronephrosis  · Increase fluid intake, 60 to 70 ounces of fluids a day  · Treatment:  · Litholink/urine studies      #Volume status/hypertension:  • Volume: Euvolemic  • Blood pressure: Normotensive, /90mmhg, goal <140/90  • Recommend:  • Low-sodium diet  • Valsartan 80 mg daily  • Advised to maintain a good BP control to prevent progression of CKD     #Hypokalemia  · Serum potassium 3.3 mEq/L  · Increase potassium in the diet  · We will monitor BMP    #Obesity   • BMI 30.65  • Recommend weight loss , heathy diet  • Lifestyle modification        HISTORY OF PRESENT ILLNESS:  Valentin Guadalupe is a 67 y.o.  female with medical issues of hypertension, nephrolithiasis, GERD, COPD, PAD, obesity who presents for initial consultation for nephrolithiasis    REVIEW OF SYSTEMS:    More than 10 point review of systems were obtained and discussed in length with the patient. Complete review of systems were negative / unremarkable except mentioned in the HPI section.     Review of Systems - Psychological ROS: negative  Ophthalmic ROS: negative  ENT ROS: negative  Hematological and Lymphatic ROS: negative  Endocrine ROS: negative  Respiratory ROS: no cough, shortness of breath, or wheezing  Cardiovascular ROS: no chest pain or dyspnea on exertion  Gastrointestinal ROS: no abdominal pain, change in bowel habits, or black or bloody stools  Genito-Urinary ROS: no dysuria, trouble voiding, or hematuria  Musculoskeletal ROS: negative  Neurological ROS: no TIA or stroke symptoms  Dermatological ROS: negative     PHYSICAL EXAM:  Vitals:    09/01/23 1322   BP: 130/90   Weight: 73.6 kg (162 lb 3.2 oz)   Height: 5' 1" (1.549 m)     Body mass index is 30.65 kg/m². Physical Exam   General:  no acute distress at this time  Skin:  No acute rash  Eyes:  No scleral icterus and noninjected  ENT:  mucous membranes moist  Neck:  no carotid bruits  Chest:  Clear to auscultation percussion, good respiratory effort, no use of accessory respiratory muscles  CVS:  Regular rate and rhythm without rub   Abdomen:  soft and nontender   Extremities: no significant lower extremity edema  Neuro:  No gross focality  Psych:  Alert , cooperative       PAST MEDICAL HISTORY:  Past Medical History:   Diagnosis Date   • Abnormal ECG 2010 ? • Arthritis    • Asthma    • Chronic pain disorder    • Colon polyp    • COPD (chronic obstructive pulmonary disease) (HCC)    • Coronary artery disease    • Fibromyalgia, primary    • GERD (gastroesophageal reflux disease)    • High blood pressure    • History of transfusion    • Hyperlipidemia    • Hypertension    • Irritable bowel syndrome    • Kidney stone    • PONV (postoperative nausea and vomiting)    • Varicella        PAST SURGICAL HISTORY:  Past Surgical History:   Procedure Laterality Date   • BARIATRIC SURGERY     • COLONOSCOPY     • GASTROSTOMY     • HYSTERECTOMY Bilateral     complete  age 54   • IR IVC FILTER REMOVAL  12/07/2015   • IR LOWER EXTREMITY ANGIOGRAM  08/15/2022   • JOINT REPLACEMENT      bilateral knee   • LAPAROSCOPIC GASTRIC BANDING     • POPLITEAL ARTERY STENT     • [place] SLCTV CATHJ 3RD+ ORD SLCTV ABDL PEL/TR [facility] Right 08/15/2022    Procedure:  Aortogram RLE runoff L CFA access w/ 6F sheath and mynx closure R popliteal PTA w/ 4x60mm  balloon;  Surgeon: Trisha Shin MD;  Location[facility];  Service: Vascular   • REPLACEMENT TOTAL KNEE     • TONSILLECTOMY     • [address] [place] [address] 12/07/2015       ALLERGIES:  Allergies   Allergen Reactions   • Erythromycin Nausea Only     Nausea     • Levofloxacin    • Codeine Other (See Comments)     RINGING IN EARS AND DIFFICULTY SLEEPING   • Trospium Other (See Comments)     Dry mouth       SOCIAL HISTORY:  Social History     Substance and Sexual Activity   Alcohol Use Never     Social History     Substance and Sexual Activity   Drug Use Never     Social History     Tobacco Use   Smoking Status Never   Smokeless Tobacco Never       FAMILY HISTORY:  Family History   Problem Relation Age of Onset   • Ovarian cancer Mother    • No Known Problems Father    • Colon cancer Cousin    • Cancer Cousin         colon   • Cancer Maternal Grandmother    • No Known Problems Maternal Grandfather    • Osteoarthritis Paternal Grandmother    • Heart attack Paternal Grandfather    • No Known Problems Son    • No Known Problems Son    • No Known Problems Maternal Aunt    • No Known Problems Paternal Aunt        MEDICATIONS:    Current Outpatient Medications:   •  ALPRAZolam (XANAX) 0.25 mg tablet, , Disp: , Rfl:   •  aspirin (ECOTRIN LOW STRENGTH) 81 mg EC tablet, Take 81 mg by mouth daily, Disp: , Rfl:   •  Aspirin-Acetaminophen-Caffeine (EXCEDRIN PO), Take by mouth daily, Disp: , Rfl:   •  cholestyramine (QUESTRAN) 4 g packet, Take 1 packet (4 g total) by mouth daily at bedtime, Disp: 90 packet, Rfl: 3  •  clobetasol (TEMOVATE) 0.05 % cream, Apply topically 2 (two) times a day, Disp: 45 g, Rfl: 2  •  diazepam (VALIUM) 2 mg tablet, Take 2 mg by mouth daily at bedtime, Disp: , Rfl:   •  DULoxetine (CYMBALTA) 20 mg capsule, Take 20 mg by mouth every other day, Disp: , Rfl:   •  esomeprazole (NexIUM) 40 MG capsule, TAKE 1 CAPSULE BY MOUTH IN THE  MORNING TAKE 1/2 HOUR PRIOR TO  BREAKFAST, Disp: 90 capsule, Rfl: 3  •  tiotropium (Spiriva HandiHaler) 18 mcg inhalation capsule, Place 18 mcg into inhaler and inhale, Disp: , Rfl:   •  valsartan (DIOVAN) 80 mg tablet, Take 80 mg by mouth daily, Disp: , Rfl:     Lab Results:   Results for orders placed or performed in visit on 03/03/23   Urine culture    Specimen: Urine   Result Value Ref Range    Urine Culture No Growth <1000 cfu/mL    POCT urine dip   Result Value Ref Range    LEUKOCYTE ESTERASE,UA large     NITRITE,UA negative     SL AMB POCT UROBILINOGEN 0.2     POCT URINE PROTEIN negative      PH,UA 6.0     BLOOD,UA large     SPECIFIC GRAVITY,UA 1.005     KETONES,UA negative     BILIRUBIN,UA negative     GLUCOSE, UA negative      COLOR,UA octavio     CLARITY,UA hazy        Portions of the record may have been created with voice recognition software. Occasional wrong word or "sound a like" substitutions may have occurred due to the inherent limitations of voice recognition software. Read the chart carefully and recognize, using context, where substitutions have occurred.

## 2023-09-01 NOTE — PATIENT INSTRUCTIONS
Thank you for coming to your visit today. As we discussed you kidney function is normal, your creatinine is 0.6 mg/dL. Your potassium is low.  Please follow the recommendations below       Recommend low sodium (salt) food  Increase potassium in your body    Try to exercise at least 30 minutes 3 days a week to begin with with an ultimate goal of 5 days a week for at least 30 minutes    Try to lose 5-10 lb by your next visit      Measures to reduce stone development:    Please Drink  oz of water or 2.5L-3 L a day, throughout the day  Avoid salt/low-salt diet   Try to decrease animal protein intake, dairy protein and vegetable base protein are better  Increase fruits and vegetables as much as possible  Avoid calcium products such as Tums or other types of calcium containing antacids, you can use Pepcid for indigestion (but you do not have to restrict your dietary calcium)  Avoid excessive vitamin D   Avoid excessive vitamin C  Try to avoid oxalate products (please refer to the diet sheet)  Limit fructose and sucrose type drinks such as coke      Next Visit in 4-5  months with results   If you need to see us earlier we can change the appointment for you      Loy Perez MD  Nephrology Attending

## 2023-09-05 PROBLEM — E87.6 HYPOKALEMIA: Status: ACTIVE | Noted: 2023-09-05

## 2023-09-05 PROBLEM — N20.0 NEPHROLITHIASIS: Status: ACTIVE | Noted: 2023-09-05

## 2023-09-15 ENCOUNTER — TELEPHONE (OUTPATIENT)
Dept: NEPHROLOGY | Facility: CLINIC | Age: 73
End: 2023-09-15

## 2023-09-15 NOTE — TELEPHONE ENCOUNTER
Critical Lab Results   Name and location of person calling [address] [place] [address] Lab   Date labs were drawn 9/14/23   Type of lab (CBC. BMP etc) BMP   Lab Value Potassium 2.7   Read back done? Yes   Additional Information  [phone]     Tigertexted Dr. Asaf Sanchez. Dr. Arnol Matos is on vacation.

## 2023-09-15 NOTE — TELEPHONE ENCOUNTER
Tried calling the patient to discuss her low potassium level but patient not reachable. Discussed with alternate contact patient's son who is not aware of any symptoms at this point. Recommended going to ED for at least EKG and administration of intravenous potassium to begin with and further evaluation of hypokalemia can be completed as outpatient.   We will send a message to patient's primary nephrologist.

## 2023-09-17 ENCOUNTER — APPOINTMENT (EMERGENCY)
Dept: CT IMAGING | Facility: HOSPITAL | Age: 73
End: 2023-09-17
Payer: COMMERCIAL

## 2023-09-17 ENCOUNTER — HOSPITAL ENCOUNTER (EMERGENCY)
Facility: HOSPITAL | Age: 73
Discharge: LEFT BEFORE DISCHARGE | End: 2023-09-17
Attending: EMERGENCY MEDICINE
Payer: COMMERCIAL

## 2023-09-17 ENCOUNTER — APPOINTMENT (EMERGENCY)
Dept: RADIOLOGY | Facility: HOSPITAL | Age: 73
End: 2023-09-17
Payer: COMMERCIAL

## 2023-09-17 VITALS
OXYGEN SATURATION: 97 % | HEART RATE: 81 BPM | RESPIRATION RATE: 14 BRPM | SYSTOLIC BLOOD PRESSURE: 147 MMHG | TEMPERATURE: 98.5 F | DIASTOLIC BLOOD PRESSURE: 87 MMHG

## 2023-09-17 DIAGNOSIS — N39.0 UTI (URINARY TRACT INFECTION): ICD-10-CM

## 2023-09-17 DIAGNOSIS — J18.9 PNEUMONIA: ICD-10-CM

## 2023-09-17 DIAGNOSIS — M25.561 RIGHT KNEE PAIN: ICD-10-CM

## 2023-09-17 DIAGNOSIS — R03.0 ELEVATED BLOOD PRESSURE READING: ICD-10-CM
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
## 2023-09-17 DIAGNOSIS — A41.9 SEPSIS (HCC): Primary | ICD-10-CM

## 2023-09-17 DIAGNOSIS — E87.6 HYPOKALEMIA: ICD-10-CM

## 2023-09-17 DIAGNOSIS — M89.9 BONE LESION: ICD-10-CM

## 2023-09-17 DIAGNOSIS — M54.9 BACK PAIN: ICD-10-CM

## 2023-09-17 DIAGNOSIS — S09.90XA INJURY OF HEAD, INITIAL ENCOUNTER: ICD-10-CM

## 2023-09-17 LAB
2HR DELTA HS TROPONIN: 1 NG/L
ALBUMIN SERPL BCP-MCNC: 3.6 G/DL (ref 3.5–5)
ALP SERPL-CCNC: 60 U/L (ref 34–104)
ALT SERPL W P-5'-P-CCNC: 4 U/L (ref 7–52)
ANION GAP SERPL CALCULATED.3IONS-SCNC: 12 MMOL/L
APTT PPP: 28 SECONDS (ref 23–37)
AST SERPL W P-5'-P-CCNC: 17 U/L (ref 13–39)
BACTERIA UR QL AUTO: ABNORMAL /HPF
BASOPHILS # BLD AUTO: 0.05 THOUSANDS/ÂΜL (ref 0–0.1)
BASOPHILS NFR BLD AUTO: 0 % (ref 0–1)
BILIRUB SERPL-MCNC: 0.89 MG/DL (ref 0.2–1)
BILIRUB UR QL STRIP: NEGATIVE
BUN SERPL-MCNC: 7 MG/DL (ref 5–25)
CALCIUM SERPL-MCNC: 8.8 MG/DL (ref 8.4–10.2)
CARDIAC TROPONIN I PNL SERPL HS: 2 NG/L
CARDIAC TROPONIN I PNL SERPL HS: 3 NG/L
CHLORIDE SERPL-SCNC: 101 MMOL/L (ref 96–108)
CLARITY UR: ABNORMAL
CO2 SERPL-SCNC: 24 MMOL/L (ref 21–32)
COLOR UR: YELLOW
CREAT SERPL-MCNC: 0.65 MG/DL (ref 0.6–1.3)
EOSINOPHIL # BLD AUTO: 0.12 THOUSAND/ÂΜL (ref 0–0.61)
EOSINOPHIL NFR BLD AUTO: 1 % (ref 0–6)
ERYTHROCYTE [DISTWIDTH] IN BLOOD BY AUTOMATED COUNT: 14.6 % (ref 11.6–15.1)
FLUAV RNA RESP QL NAA+PROBE: NEGATIVE
FLUBV RNA RESP QL NAA+PROBE: NEGATIVE
GFR SERPL CREATININE-BSD FRML MDRD: 88 ML/MIN/1.73SQ M
GLUCOSE SERPL-MCNC: 114 MG/DL (ref 65–140)
GLUCOSE UR STRIP-MCNC: NEGATIVE MG/DL
HCT VFR BLD AUTO: 39.2 % (ref 34.8–46.1)
HGB BLD-MCNC: 12.6 G/DL (ref 11.5–15.4)
HGB UR QL STRIP.AUTO: ABNORMAL
IMM GRANULOCYTES # BLD AUTO: 0.05 THOUSAND/UL (ref 0–0.2)
IMM GRANULOCYTES NFR BLD AUTO: 0 % (ref 0–2)
INR PPP: 0.86 (ref 0.84–1.19)
KETONES UR STRIP-MCNC: ABNORMAL MG/DL
LACTATE SERPL-SCNC: 1.3 MMOL/L (ref 0.5–2)
LEUKOCYTE ESTERASE UR QL STRIP: ABNORMAL
LYMPHOCYTES # BLD AUTO: 0.93 THOUSANDS/ÂΜL (ref 0.6–4.47)
LYMPHOCYTES NFR BLD AUTO: 7 % (ref 14–44)
MCH RBC QN AUTO: 29.1 PG (ref 26.8–34.3)
MCHC RBC AUTO-ENTMCNC: 32.1 G/DL (ref 31.4–37.4)
MCV RBC AUTO: 91 FL (ref 82–98)
MONOCYTES # BLD AUTO: 0.52 THOUSAND/ÂΜL (ref 0.17–1.22)
MONOCYTES NFR BLD AUTO: 4 % (ref 4–12)
MUCOUS THREADS UR QL AUTO: ABNORMAL
NEUTROPHILS # BLD AUTO: 11.71 THOUSANDS/ÂΜL (ref 1.85–7.62)
NEUTS SEG NFR BLD AUTO: 88 % (ref 43–75)
NITRITE UR QL STRIP: NEGATIVE
NON-SQ EPI CELLS URNS QL MICRO: ABNORMAL /HPF
NRBC BLD AUTO-RTO: 0 /100 WBCS
PH UR STRIP.AUTO: 6 [PH]
PLATELET # BLD AUTO: 317 THOUSANDS/UL (ref 149–390)
PMV BLD AUTO: 8.7 FL (ref 8.9–12.7)
POTASSIUM SERPL-SCNC: 3.1 MMOL/L (ref 3.5–5.3)
PROCALCITONIN SERPL-MCNC: 0.05 NG/ML
PROT SERPL-MCNC: 7 G/DL (ref 6.4–8.4)
PROT UR STRIP-MCNC: ABNORMAL MG/DL
PROTHROMBIN TIME: 12.3 SECONDS (ref 11.6–14.5)
RBC # BLD AUTO: 4.33 MILLION/UL (ref 3.81–5.12)
RBC #/AREA URNS AUTO: ABNORMAL /HPF
RSV RNA RESP QL NAA+PROBE: NEGATIVE
SARS-COV-2 RNA RESP QL NAA+PROBE: NEGATIVE
SODIUM SERPL-SCNC: 137 MMOL/L (ref 135–147)
SP GR UR STRIP.AUTO: 1.03 (ref 1–1.03)
UROBILINOGEN UR STRIP-ACNC: <2 MG/DL
WBC # BLD AUTO: 13.38 THOUSAND/UL (ref 4.31–10.16)
WBC #/AREA URNS AUTO: ABNORMAL /HPF

## 2023-09-17 PROCEDURE — 96367 TX/PROPH/DG ADDL SEQ IV INF: CPT

## 2023-09-17 PROCEDURE — 84484 ASSAY OF TROPONIN QUANT: CPT | Performed by: EMERGENCY MEDICINE

## 2023-09-17 PROCEDURE — 93005 ELECTROCARDIOGRAM TRACING: CPT

## 2023-09-17 PROCEDURE — G1004 CDSM NDSC: HCPCS

## 2023-09-17 PROCEDURE — 71046 X-RAY EXAM CHEST 2 VIEWS: CPT

## 2023-09-17 PROCEDURE — 87040 BLOOD CULTURE FOR BACTERIA: CPT | Performed by: EMERGENCY MEDICINE

## 2023-09-17 PROCEDURE — 96368 THER/DIAG CONCURRENT INF: CPT

## 2023-09-17 PROCEDURE — 99285 EMERGENCY DEPT VISIT HI MDM: CPT | Performed by: EMERGENCY MEDICINE

## 2023-09-17 PROCEDURE — 80053 COMPREHEN METABOLIC PANEL: CPT | Performed by: EMERGENCY MEDICINE

## 2023-09-17 PROCEDURE — 71260 CT THORAX DX C+: CPT

## 2023-09-17 PROCEDURE — 87086 URINE CULTURE/COLONY COUNT: CPT | Performed by: EMERGENCY MEDICINE

## 2023-09-17 PROCEDURE — 73564 X-RAY EXAM KNEE 4 OR MORE: CPT

## 2023-09-17 PROCEDURE — 36415 COLL VENOUS BLD VENIPUNCTURE: CPT | Performed by: EMERGENCY MEDICINE

## 2023-09-17 PROCEDURE — 84145 PROCALCITONIN (PCT): CPT | Performed by: EMERGENCY MEDICINE

## 2023-09-17 PROCEDURE — 83605 ASSAY OF LACTIC ACID: CPT | Performed by: EMERGENCY MEDICINE

## 2023-09-17 PROCEDURE — 96366 THER/PROPH/DIAG IV INF ADDON: CPT

## 2023-09-17 PROCEDURE — 99285 EMERGENCY DEPT VISIT HI MDM: CPT

## 2023-09-17 PROCEDURE — 85730 THROMBOPLASTIN TIME PARTIAL: CPT | Performed by: EMERGENCY MEDICINE

## 2023-09-17 PROCEDURE — 72125 CT NECK SPINE W/O DYE: CPT

## 2023-09-17 PROCEDURE — 73590 X-RAY EXAM OF LOWER LEG: CPT

## 2023-09-17 PROCEDURE — 70450 CT HEAD/BRAIN W/O DYE: CPT

## 2023-09-17 PROCEDURE — 81001 URINALYSIS AUTO W/SCOPE: CPT | Performed by: EMERGENCY MEDICINE

## 2023-09-17 PROCEDURE — 85025 COMPLETE CBC W/AUTO DIFF WBC: CPT | Performed by: EMERGENCY MEDICINE

## 2023-09-17 PROCEDURE — 74177 CT ABD & PELVIS W/CONTRAST: CPT

## 2023-09-17 PROCEDURE — 96365 THER/PROPH/DIAG IV INF INIT: CPT

## 2023-09-17 PROCEDURE — 87077 CULTURE AEROBIC IDENTIFY: CPT | Performed by: EMERGENCY MEDICINE

## 2023-09-17 PROCEDURE — 85610 PROTHROMBIN TIME: CPT | Performed by: EMERGENCY MEDICINE

## 2023-09-17 PROCEDURE — 0241U HB NFCT DS VIR RESP RNA 4 TRGT: CPT | Performed by: EMERGENCY MEDICINE

## 2023-09-17 PROCEDURE — 87186 SC STD MICRODIL/AGAR DIL: CPT | Performed by: EMERGENCY MEDICINE

## 2023-09-17 RX ORDER — POTASSIUM CHLORIDE 20 MEQ/1
40 TABLET, EXTENDED RELEASE ORAL ONCE
Status: DISCONTINUED | OUTPATIENT
Start: 2023-09-17 | End: 2023-09-17

## 2023-09-17 RX ORDER — POTASSIUM CHLORIDE 20 MEQ/1
20 TABLET, EXTENDED RELEASE ORAL ONCE
Status: DISCONTINUED | OUTPATIENT
Start: 2023-09-17 | End: 2023-09-17

## 2023-09-17 RX ORDER — POTASSIUM CHLORIDE 20 MEQ/1
20 TABLET, EXTENDED RELEASE ORAL DAILY
Qty: 1 TABLET | Refills: 0 | Status: SHIPPED | OUTPATIENT
Start: 2023-09-18 | End: 2023-09-17

## 2023-09-17 RX ORDER — POTASSIUM CHLORIDE 14.9 MG/ML
20 INJECTION INTRAVENOUS ONCE
Status: COMPLETED | OUTPATIENT
Start: 2023-09-17 | End: 2023-09-17

## 2023-09-17 RX ORDER — SODIUM CHLORIDE, SODIUM GLUCONATE, SODIUM ACETATE, POTASSIUM CHLORIDE, MAGNESIUM CHLORIDE, SODIUM PHOSPHATE, DIBASIC, AND POTASSIUM PHOSPHATE .53; .5; .37; .037; .03; .012; .00082 G/100ML; G/100ML; G/100ML; G/100ML; G/100ML; G/100ML; G/100ML
500 INJECTION, SOLUTION INTRAVENOUS ONCE
Status: COMPLETED | OUTPATIENT
Start: 2023-09-17 | End: 2023-09-17

## 2023-09-17 RX ORDER — LEVOFLOXACIN 750 MG/1
750 TABLET ORAL EVERY 24 HOURS
Qty: 13 TABLET | Refills: 0 | Status: SHIPPED | OUTPATIENT
Start: 2023-09-18 | End: 2023-10-01

## 2023-09-17 RX ORDER — POTASSIUM CHLORIDE 20MEQ/15ML
20 LIQUID (ML) ORAL ONCE
Status: COMPLETED | OUTPATIENT
Start: 2023-09-17 | End: 2023-09-17

## 2023-09-17 RX ORDER — POTASSIUM CHLORIDE 29.8 MG/ML
40 INJECTION INTRAVENOUS ONCE
Status: DISCONTINUED | OUTPATIENT
Start: 2023-09-17 | End: 2023-09-17

## 2023-09-17 RX ORDER — ONDANSETRON 2 MG/ML
4 INJECTION INTRAMUSCULAR; INTRAVENOUS ONCE
Status: DISCONTINUED | OUTPATIENT
Start: 2023-09-17 | End: 2023-09-17 | Stop reason: HOSPADM

## 2023-09-17 RX ADMIN — LEVOFLOXACIN 750 MG: 500 TABLET, FILM COATED ORAL at 13:34

## 2023-09-17 RX ADMIN — POTASSIUM CHLORIDE 20 MEQ: 14.9 INJECTION, SOLUTION INTRAVENOUS at 11:55

## 2023-09-17 RX ADMIN — IOHEXOL 80 ML: 350 INJECTION, SOLUTION INTRAVENOUS at 11:37

## 2023-09-17 RX ADMIN — SODIUM CHLORIDE, SODIUM GLUCONATE, SODIUM ACETATE, POTASSIUM CHLORIDE, MAGNESIUM CHLORIDE, SODIUM PHOSPHATE, DIBASIC, AND POTASSIUM PHOSPHATE 500 ML: .53; .5; .37; .037; .03; .012; .00082 INJECTION, SOLUTION INTRAVENOUS at 10:41

## 2023-09-17 RX ADMIN — CEFTRIAXONE SODIUM 1000 MG: 10 INJECTION, POWDER, FOR SOLUTION INTRAVENOUS at 11:55

## 2023-09-17 RX ADMIN — POTASSIUM CHLORIDE 20 MEQ: 1.5 SOLUTION ORAL at 11:54

## 2023-09-17 NOTE — ED NOTES
Pt does not want a CT of any kind. Message sent to provider.      Nicole Catherine RN  09/17/23 2692

## 2023-09-17 NOTE — ED PROVIDER NOTES
History  Chief Complaint   Patient presents with   • Abnormal Lab     Told to come here for potassium of 2.7. Patient is a 49-year-old female, with a history significant for lap band, hypertension, COPD, hyperlipidemia, peripheral arterial disease, who presents to the ED today with multiple complaints. Primarily, is presenting, at the recommendation of her nephrologist, due to a potassium of 2.7. Preceding this, patient has had a 3 to 4-week history of nausea and nonbloody emesis. She has also had decreased p.o. intake and decreased urine output secondary to this. Starting yesterday, patient also describes generalized weakness, subjective fever, cough, myalgias. No known sick contacts. Earlier in the week, she also experienced an episode of nonradiating, nonexertional, nonpleuritic chest pain that resolved with Pepto-Bismol. Furthermore, on Tuesday, patient tripped and fell striking her chin. She describes back pain since that time as well as bruising above her knee and shin and headache. Patient denies bowel or bladder incontinence, history of IVDU, inability to walk, weakness, numbness. Patient is without other concerns at this time. Prior to Admission Medications   Prescriptions Last Dose Informant Patient Reported? Taking?    ALPRAZolam (XANAX) 0.25 mg tablet  Self Yes No   Aspirin-Acetaminophen-Caffeine (EXCEDRIN PO)  Self Yes No   Sig: Take by mouth daily   DULoxetine (CYMBALTA) 20 mg capsule  Self Yes No   Sig: Take 20 mg by mouth every other day   aspirin (ECOTRIN LOW STRENGTH) 81 mg EC tablet  Self Yes No   Sig: Take 81 mg by mouth daily   cholestyramine (QUESTRAN) 4 g packet  Self No No   Sig: Take 1 packet (4 g total) by mouth daily at bedtime   clobetasol (TEMOVATE) 0.05 % cream  Self No No   Sig: Apply topically 2 (two) times a day   diazepam (VALIUM) 2 mg tablet  Self Yes No   Sig: Take 2 mg by mouth daily at bedtime   esomeprazole (NexIUM) 40 MG capsule  Self No No   Sig: TAKE 1 CAPSULE BY MOUTH IN THE  MORNING TAKE 1/2 HOUR PRIOR TO  BREAKFAST   tiotropium (Spiriva HandiHaler) 18 mcg inhalation capsule  Self Yes No   Sig: Place 18 mcg into inhaler and inhale   valsartan (DIOVAN) 80 mg tablet  Self Yes No   Sig: Take 80 mg by mouth daily      Facility-Administered Medications: None       Past Medical History:   Diagnosis Date   • Abnormal ECG 2010 ? • Arthritis    • Asthma    • Chronic pain disorder    • Colon polyp    • COPD (chronic obstructive pulmonary disease) (HCC)    • Coronary artery disease    • Fibromyalgia, primary    • GERD (gastroesophageal reflux disease)    • High blood pressure    • History of transfusion    • Hyperlipidemia    • Hypertension    • Irritable bowel syndrome    • Kidney stone    • PONV (postoperative nausea and vomiting)    • Varicella        Past Surgical History:   Procedure Laterality Date   • BARIATRIC SURGERY     • COLONOSCOPY     • GASTROSTOMY     • HYSTERECTOMY Bilateral     complete  age 54   • IR IVC FILTER REMOVAL  12/07/2015   • IR LOWER EXTREMITY ANGIOGRAM  08/15/2022   • JOINT REPLACEMENT      bilateral knee   • LAPAROSCOPIC GASTRIC BANDING     • POPLITEAL ARTERY STENT     • PA SLCTV CATHJ 3RD+ ORD SLCTV ABDL PEL/LXTR [facility] Right 08/15/2022    Procedure:  Aortogram RLE runoff L CFA access w/ 6F sheath and mynx closure R popliteal PTA w/ 4x60mm  balloon;  Surgeon: Paola Shin MD;  Location: [facility];  Service: Vascular   • REPLACEMENT TOTAL KNEE     • TONSILLECTOMY     • US GUIDED VASCULAR ACCESS  12/07/2015       Family History   Problem Relation Age of Onset   • Ovarian cancer Mother    • No Known Problems Father    • Colon cancer Cousin    • Cancer Cousin         colon   • Cancer Maternal Grandmother    • No Known Problems Maternal Grandfather    • Osteoarthritis Paternal Grandmother    • Heart attack Paternal Grandfather    • No Known Problems Son    • No Known Problems Son    • No Known Problems Maternal Aunt    • No Known Problems Paternal Aunt      I have reviewed and agree with the history as documented. E-Cigarette/Vaping   • E-Cigarette Use Never User      E-Cigarette/Vaping Substances   • Nicotine No    • THC No    • CBD No    • Flavoring No    • Other No    • Unknown No      Social History     Tobacco Use   • Smoking status: Never   • Smokeless tobacco: Never   Vaping Use   • Vaping Use: Never used   Substance Use Topics   • Alcohol use: Never   • Drug use: Never       Review of Systems   Constitutional: Positive for fever. HENT: Positive for trouble swallowing. Eyes: Positive for visual disturbance (Blurring, no acute change, upcoming cataract management ). Respiratory: Negative for shortness of breath. Cardiovascular: Positive for chest pain. Gastrointestinal: Positive for nausea and vomiting. Negative for abdominal pain. Endocrine: Negative for polyuria. Genitourinary: Positive for decreased urine volume. Negative for dysuria. Musculoskeletal: Negative for gait problem. Skin: Positive for color change. Allergic/Immunologic: Positive for environmental allergies. Neurological: Negative for weakness and numbness. Hematological: Negative for adenopathy. Psychiatric/Behavioral: Negative for confusion. All other systems reviewed and are negative. Physical Exam  Physical Exam  Vitals and nursing note reviewed. Constitutional:       General: She is not in acute distress. Appearance: She is not toxic-appearing or diaphoretic. Comments: Patient appears comfortable during my evaluation    HENT:      Head: Normocephalic. Comments: Large bruise on left chin. No jaw malocclusion. No tenderness to palpation of the facial bones or bony orbits    No raccoon's eyes, no parson sign     Right Ear: External ear normal.      Left Ear: External ear normal.      Nose: Nose normal. No rhinorrhea. Mouth/Throat:      Mouth: Mucous membranes are dry. Pharynx: Oropharynx is clear.  No oropharyngeal exudate or posterior oropharyngeal erythema. Comments: Uvula midline. No oropharyngeal or submandibular mass/swelling  Eyes:      General: No scleral icterus. Right eye: No discharge. Left eye: No discharge. Conjunctiva/sclera: Conjunctivae normal.      Pupils: Pupils are equal, round, and reactive to light. Cardiovascular:      Rate and Rhythm: Normal rate and regular rhythm. Pulses: Normal pulses. Heart sounds: Normal heart sounds. No murmur heard. No friction rub. No gallop. Comments: 2+ Radial  Pulmonary:      Effort: Pulmonary effort is normal. No respiratory distress. Breath sounds: Normal breath sounds. No stridor. No wheezing, rhonchi or rales. Chest:      Chest wall: No tenderness. Abdominal:      General: Abdomen is flat. There is no distension. Palpations: Abdomen is soft. Tenderness: There is no abdominal tenderness. There is no right CVA tenderness, left CVA tenderness, guarding or rebound. Musculoskeletal:         General: No deformity. Cervical back: Normal range of motion and neck supple. No rigidity or tenderness. No muscular tenderness. Comments: Midline L-spine tenderness to palpation. Tenderness to palpation of the right knee/upper shin with overlying bruising. No skin defect    No tenderness to palpation of the bilateral shoulders, elbows, arms, thighs. No chest wall or pelvic tenderness to palpation   No midline C, T spine tenderness to palpation    Lymphadenopathy:      Cervical: No cervical adenopathy. Skin:     General: Skin is warm and dry. Capillary Refill: Capillary refill takes less than 2 seconds. Neurological:      Mental Status: She is alert. Comments: Cranial nerves 2-12 intact. 5/5 strength in all four extremities including finger extension against resistance. Sensation to light touch subjectively intact/equal in all four extremities and the face.   Patient able to ambulate without difficulty. Patient is speaking clearly in complete sentences. Patient is answering appropriately and able to follow commands. Intact L5-S1 motor and sensory function bilaterally. 2/4 patellar reflexes bilaterally. No saddle anesthesia. Psychiatric:         Mood and Affect: Mood normal.         Behavior: Behavior normal.         Vital Signs  ED Triage Vitals   Temperature Pulse Respirations Blood Pressure SpO2   09/17/23 0921 09/17/23 0919 09/17/23 0919 09/17/23 0919 09/17/23 0919   98.6 °F (37 °C) 92 16 138/89 95 %      Temp Source Heart Rate Source Patient Position - Orthostatic VS BP Location FiO2 (%)   09/17/23 0921 09/17/23 0919 09/17/23 0919 09/17/23 0919 --   Oral Monitor Sitting Right arm       Pain Score       --                  Vitals:    09/17/23 0919 09/17/23 1041 09/17/23 1200   BP: 138/89 137/82 147/87   Pulse: 92 80 81   Patient Position - Orthostatic VS: Sitting           Visual Acuity      ED Medications  Medications   ondansetron (ZOFRAN) injection 4 mg (0 mg Intravenous Hold 9/17/23 1030)   potassium chloride 20 mEq IVPB (premix) (20 mEq Intravenous New Bag 9/17/23 1155)   multi-electrolyte (ISOLYTE-S PH 7.4) bolus 500 mL (0 mL Intravenous Stopped 9/17/23 1158)   ceftriaxone (ROCEPHIN) 1 g/50 mL in dextrose IVPB (0 mg Intravenous Stopped 9/17/23 1259)   potassium chloride oral solution 20 mEq (20 mEq Oral Given 9/17/23 1154)   iohexol (OMNIPAQUE) 350 MG/ML injection (MULTI-DOSE) 80 mL (80 mL Intravenous Given 9/17/23 1137)   levofloxacin (LEVAQUIN) tablet 750 mg (750 mg Oral Given 9/17/23 1334)       Diagnostic Studies  Results Reviewed     Procedure Component Value Units Date/Time    Blood culture [[id number]] Collected: 09/17/23 0957    Lab Status: Preliminary result Specimen: Blood from Arm, Left Updated: 09/17/23 1301     Blood Culture Received in Microbiology Lab. Culture in Progress.     HS Troponin I 2hr [[id number]]  (Normal) Collected: 09/17/23 1214    Lab Status: Final result Specimen: Blood from Arm, Right Updated: 09/17/23 1248     hs TnI 2hr 3 ng/L      Delta 2hr hsTnI 1 ng/L     Urine Microscopic [[id number]]  (Abnormal) Collected: 09/17/23 1154    Lab Status: Final result Specimen: Urine, Straight Cath Updated: 09/17/23 1240     RBC, UA 4-10 /hpf      WBC, UA Innumerable /hpf      Epithelial Cells Occasional /hpf      Bacteria, UA Occasional /hpf      MUCUS THREADS Innumerable    Urine culture [[id number]] Collected: 09/17/23 1154    Lab Status: In process Specimen: Urine, Straight Cath Updated: 09/17/23 1240    UA w Reflex to Microscopic w Reflex to Culture [[id number]]  (Abnormal) Collected: 09/17/23 1154    Lab Status: Final result Specimen: Urine, Straight Cath Updated: 09/17/23 1231     Color, UA Yellow     Clarity, UA Turbid     Specific Gravity, UA 1.030     pH, UA 6.0     Leukocytes, UA Large     Nitrite, UA Negative     Protein, UA Trace mg/dl      Glucose, UA Negative mg/dl      Ketones, UA 20 (1+) mg/dl      Urobilinogen, UA <2.0 mg/dl      Bilirubin, UA Negative     Occult Blood, UA Small    HS Troponin I 4hr [[id number]]     Lab Status: No result Specimen: Blood     Procalcitonin [[id number]]  (Normal) Collected: 09/17/23 1028    Lab Status: Final result Specimen: Blood from Arm, Right Updated: 09/17/23 1119     Procalcitonin 0.05 ng/ml     COVID/FLU/RSV [[id number]]  (Normal) Collected: 09/17/23 1013    Lab Status: Final result Specimen: Nares from Nose Updated: 09/17/23 1117     SARS-CoV-2 Negative     INFLUENZA A PCR Negative     INFLUENZA B PCR Negative     RSV PCR Negative    Narrative:      FOR PEDIATRIC PATIENTS - copy/paste COVID Guidelines URL to browser: [email or web address]    SARS-CoV-2 assay is a Nucleic Acid Amplification assay intended for the  qualitative detection of nucleic acid from SARS-CoV-2 in nasopharyngeal  swabs. Results are for the presumptive identification of SARS-CoV-2 RNA.     Positive results are indicative of infection with SARS-CoV-2, the virus  causing COVID-19, but do not rule out bacterial infection or co-infection  with other viruses. Laboratories within the [facility] and its  territories are required to report all positive results to the appropriate  public health authorities. Negative results do not preclude SARS-CoV-2  infection and should not be used as the sole basis for treatment or other  patient management decisions. Negative results must be combined with  clinical observations, patient history, and epidemiological information. This test has not been FDA cleared or approved. This test has been authorized by FDA under an Emergency Use Authorization  (EUA). This test is only authorized for the duration of time the  declaration that circumstances exist justifying the authorization of the  emergency use of an in vitro diagnostic tests for detection of SARS-CoV-2  virus and/or diagnosis of COVID-19 infection under section 564(b)(1) of  the Act, 21 U. S.C. 044YZY-4(M)(8), unless the authorization is terminated  or revoked sooner. The test has been validated but independent review by FDA  and CLIA is pending. Test performed using [employer] GeneXpert: This RT-PCR assay targets N2,  a region unique to SARS-CoV-2. A conserved region in the E-gene was chosen  for pan-Sarbecovirus detection which includes SARS-CoV-2. According to CMS-2020-01-R, this platform meets the definition of high-throughput technology. Lactic acid, plasma (w/reflex if result > 2.0) [[id number]]  (Normal) Collected: 09/17/23 1028    Lab Status: Final result Specimen: Blood from Arm, Right Updated: 09/17/23 1103     LACTIC ACID 1.3 mmol/L     Narrative:      Result may be elevated if tourniquet was used during collection.     HS Troponin 0hr (reflex protocol) [[id number]]  (Normal) Collected: 09/17/23 0958    Lab Status: Final result Specimen: Blood from Arm, Left Updated: 09/17/23 1045     hs TnI 0hr 2 ng/L Comprehensive metabolic panel [[id number]]  (Abnormal) Collected: 09/17/23 0942    Lab Status: Final result Specimen: Blood from Arm, Left Updated: 09/17/23 1042     Sodium 137 mmol/L      Potassium 3.1 mmol/L      Chloride 101 mmol/L      CO2 24 mmol/L      ANION GAP 12 mmol/L      BUN 7 mg/dL      Creatinine 0.65 mg/dL      Glucose 114 mg/dL      Calcium 8.8 mg/dL      AST 17 U/L      ALT 4 U/L      Alkaline Phosphatase 60 U/L      Total Protein 7.0 g/dL      Albumin 3.6 g/dL      Total Bilirubin 0.89 mg/dL      eGFR 88 ml/min/1.73sq m     Narrative:      [place] guidelines for Chronic Kidney Disease (CKD):   •  Stage 1 with normal or high GFR (GFR > 90 mL/min/1.73 square meters)  •  Stage 2 Mild CKD (GFR = 60-89 mL/min/1.73 square meters)  •  Stage 3A Moderate CKD (GFR = 45-59 mL/min/1.73 square meters)  •  Stage 3B Moderate CKD (GFR = 30-44 mL/min/1.73 square meters)  •  Stage 4 Severe CKD (GFR = 15-29 mL/min/1.73 square meters)  •  Stage 5 End Stage CKD (GFR <15 mL/min/1.73 square meters)  Note: GFR calculation is accurate only with a steady state creatinine    Blood culture [[id number]] Collected: 09/17/23 1028    Lab Status:  In process Specimen: Blood from Arm, Right Updated: 09/17/23 [address] [[id number]]  (Normal) Collected: 09/17/23 0958    Lab Status: Final result Specimen: Blood from Arm, Left Updated: 09/17/23 1034     Protime 12.3 seconds      INR 0.86    APTT [[id number]]  (Normal) Collected: 09/17/23 0958    Lab Status: Final result Specimen: Blood from Arm, Left Updated: 09/17/23 1034     PTT 28 seconds     CBC and differential [[id number]]  (Abnormal) Collected: 09/17/23 0942    Lab Status: Final result Specimen: Blood from Arm, Left Updated: 09/17/23 0953     WBC 13.38 Thousand/uL      RBC 4.33 Million/uL      Hemoglobin 12.6 g/dL      Hematocrit 39.2 %      MCV 91 fL      MCH 29.1 pg      MCHC 32.1 g/dL      RDW 14.6 %      MPV 8.7 fL      Platelets 272 Thousands/uL      nRBC 0 /100 WBCs      Neutrophils Relative 88 %      Immat GRANS % 0 %      Lymphocytes Relative 7 %      Monocytes Relative 4 %      Eosinophils Relative 1 %      Basophils Relative 0 %      Neutrophils Absolute 11.71 Thousands/µL      Immature Grans Absolute 0.05 Thousand/uL      Lymphocytes Absolute 0.93 Thousands/µL      Monocytes Absolute 0.52 Thousand/µL      Eosinophils Absolute 0.12 Thousand/µL      Basophils Absolute 0.05 Thousands/µL                  CT head without contrast   Final Result by Neva Sheridan MD (09/17 1221)      No intracranial hemorrhage or calvarial fracture. Trace, chronic appearing microangiopathy                  Workstation performed: [id number]         CT spine cervical without contrast   Final Result by Neva Sheridan MD (09/17 1224)   1. Marked distention of the visualized portions of the mid esophagus with air-fluid level. Findings may be related to achalasia or obstructing esophageal lesion versus severe reflux. Further clinical assessment advised. See separate CT of the chest,    abdomen and pelvis. 2.  No cervical spine fracture or traumatic malalignment. Workstation performed: [id number]         CT recon only lumbar spine   Final Result by Neva Sheridan MD (09/17 4500)         1. No acute fracture or subluxation. 2. Mild to moderate spondylosis   3. A few sclerotic lesions described on the concurrent CT of the chest abdomen pelvis may represent scattered bone islands versus sclerotic metastases versus other etiologies. Further clinical assessment and follow-up recommended. If there is a high    clinical suspicion for underlying malignancy, whole body bone scan could be obtained for further characterization.                I personally discussed this study with Daria Becerra on 9/17/2023 12:47 PM.                  Workstation performed: [id number]         CT chest abdomen pelvis w contrast   Final Result by Kyle Newell MD (09/17 0658)         1. Patchy density in the right upper lobe having appearance of pneumonia, potentially an aspiration pneumonia given the marked distention of the esophagus. 2. Marked distention of the distal half of the esophagus with air-fluid level proximal to a gastric lap band. Further clinical assessment and follow-up advised. Is the gastric lap band to tight? 3. 2 small indeterminate sclerotic lesions in the left iliac bone and right ischium potentially bone islands. However if there is a strong clinical suspicion for underlying malignancy nonemergent whole-body bone scan could be obtained for further    characterization. I personally discussed this study with Roberto Mays on 9/17/2023 12:41 PM.                  Workstation performed: [id number]         XR knee 4+ vw right injury   ED Interpretation by Roberto Mays MD (09/17 1021)   Per my independent interpretation: No acute osseous abnormality      XR tibia fibula 2 views RIGHT   ED Interpretation by Roberto Mays MD (09/17 1021)   Per my independent interpretation: No acute osseous abnormality      XR chest 2 views   ED Interpretation by Roberto Mays MD (09/17 1022)   Per my independent interpretation:  Right sided consolidation                  Procedures  Procedures         ED Course  ED Course as of 09/17/23 1338   Sun Sep 17, 2023   1022 WBC(!): 13.38  High   1050 Potassium(!): 3.1  Low, improved   1059 Bladder scan 11cc, no urinary retention    1104 LACTIC ACID: 1.3  WNL   1255 WBC, UA(!): Innumerable  High   1255 Bacteria, UA: Occasional  Abnormal   1257 Delta 2hr hsTnI: 1  WNL   1306 PSI class II; however, if neoplastic disease present, patient is class IV   1319 I discussed patient's levofloxacin allergy with her. She states her allergy is nausea.   Patient agreeable with taking this medication due to its coverage of both urinary tract infection and pneumonia 1320 Given patient's multiple pathologies occurring at this time, admission was recommended; however, patient states that she, under no circumstances, will be admitted today. She states that she has follow-up with her GI doctor at [facility] tomorrow for her band and states that her  is a radiologist who will review her imaging results when she returns home    When the risks of going home were discussed, patient states that, even if she were to have cancer, that she lived a good life and would not want treatment for it regardless. She also expressed that, with the infections, that she lived a good life and if it is her time it is her time but she is in agreement with antibiotic treatment. She also expressed understanding and was able to manipulate information regarding the risks of death or permanent disability due to her infections/likely aspiration event. Alternatives, including close outpatient follow-up and outpatient antibiotics, were provided. Throughout this conversation, patient demonstrated capacity    Given patient's listed allergy, will administer dose prior to leaving. Patient continues to appear overall well                            Initial Sepsis Screening     Row Name 09/17/23 7990                Is the patient's history suggestive of a new or worsening infection? Yes (Proceed)  -CL        Suspected source of infection pneumonia  -CL        Indicate SIRS criteria Tachycardia > 90 bpm;Leukocytosis (WBC > 85526 IJL) OR Leukopenia (WBC <4000 IJL) OR Bandemia (WBC >10% bands)  -CL        Are two or more of the above signs & symptoms of infection both present and new to the patient? Yes (Proceed)  -CL        Assess for evidence of organ dysfunction: Are any of the below criteria present within 6 hours of suspected infection and SIRS criteria that are NOT considered to be chronic conditions?  --              User Key  (r) = Recorded By, (t) = Taken By, (c) = Cosigned By    [id number] [facility] Name Provider Type    CL Vargas Baez MD Physician                              Medical Decision Making  Patient is a 51-year-old female, with a history significant for lap band, hypertension, COPD, hyperlipidemia, peripheral arterial disease, who presents to the ED today with multiple complaints. Primarily, is presenting, at the recommendation of her nephrologist, due to a potassium of 2.7. Preceding this, patient has had a 3 to 4-week history of nausea and nonbloody emesis. She has also had decreased p.o. intake and decreased urine output secondary to this. Starting yesterday, patient also describes generalized weakness, subjective fever, cough, myalgias. No known sick contacts. Earlier in the week, she also experienced an episode of nonradiating, nonexertional, nonpleuritic chest pain that resolved with Pepto-Bismol. Furthermore, on Tuesday, patient tripped and fell striking her chin. She describes back pain since that time as well as bruising above her knee and shin and headache. Patient denies bowel or bladder incontinence, history of IVDU, inability to walk, weakness, numbness. Patient is currently afebrile and hemodynamically stable. Her physical exam is notable for bruising on the chin and right knee, tenderness to palpation of the right knee and mid lumbar spine, no focal neurodeficit, clear heart and lungs, soft nontender abdomen, dry mucous membranes. This presentation is concerning for: Hypokalemia, dehydration, LETTY, ACS, ICH, fracture, erosion of rhiannon band. Patient at risk for pneumonia and UTI. Will investigate with sepsis panel order set, cardiac work-up, CT imaging of the head abdomen pelvis with lumbar recon, x-ray imaging of the right knee and chest.  Will manage with fluids, Zofran, and further based upon work-up. Amount and/or Complexity of Data Reviewed  Labs: ordered. Decision-making details documented in ED Course.   Radiology: ordered and independent interpretation performed. Risk  Prescription drug management. Disposition  Final diagnoses:   Sepsis ([address])   Pneumonia   Fall, initial encounter   Injury of head, initial encounter   Elevated blood pressure reading   Right knee pain   Bone lesion   Back pain   UTI (urinary tract infection)   Hypokalemia     Time reflects when diagnosis was documented in both MDM as applicable and the Disposition within this note     Time User Action Codes Description Comment    9/17/2023  1:13 PM Ada Sham Add [A41.9] Sepsis ([address])     9/17/2023  1:13 PM Ada Sham A Add [J18.9] Pneumonia     9/17/2023  1:13 PM Ada Sham Add [P34. XXXA] Fall, initial encounter     9/17/2023  1:14 PM Legare, Remonia Creed A Add [S09.90XA] Injury of head, initial encounter     9/17/2023  1:14 PM Legare, Remonia Creed A Add [R03.0] Elevated blood pressure reading     9/17/2023  1:14 PM Ada Sham Add [M25.561] Right knee pain     9/17/2023  1:14 PM Ada Sham A Add [M89.9] Bone lesion     9/17/2023  1:14 PM Ada Sham A Add [M54.9] Back pain     9/17/2023  1:16 PM Ada Sham Add [N39.0] UTI (urinary tract infection)     9/17/2023  1:16 PM Ada Sham A Add [E87.6] Hypokalemia       ED Disposition     ED Disposition   AMA    Condition   --    Date/Time   Sun Sep 17, 2023  1:24 PM    Comment   Date: 9/17/2023  Patient: Gareth Lowry  Admitted: 9/17/2023  9:14 AM  Attending Provider: MD Chantel Rubian Essence or her authorized caregiver has made the decision for the patient to leave the emergency department against the  advice of the emergency department staff. She or her authorized caregiver has been informed and understands the inherent risks, including death, permanent disability. She or her authorized caregiver has decided to accept the responsibility for this  decision.  Gareth Lowry and all necessary parties have been advised that she may return for further evaluation or treatment. Her condition at time of discharge was stable. Valentin Guadalupe had current vital signs as follows:  /87 (BP Locat ion: Left arm)   Pulse 81   Temp 98.5 °F (36.9 °C)   Resp 14            Follow-up Information     Follow up With Specialties Details Why Contact Info    Lise Hoffman MD Internal Medicine Schedule an appointment as soon as possible for a visit   [facility] [address] [id number]  [phone]            Patient's Medications   Discharge Prescriptions    LEVOFLOXACIN (LEVAQUIN) 750 MG TABLET    Take 1 tablet (750 mg total) by mouth every 24 hours for 13 days Do not start before September 18, 2023.        Start Date: 9/18/2023 End Date: 10/1/2023       Order Dose: 750 mg       Quantity: 13 tablet    Refills: 0           PDMP Review     None          ED Provider  Electronically Signed by           Daria Becerra MD  09/17/23 6325

## 2023-09-17 NOTE — DISCHARGE INSTRUCTIONS
You were evaluated in the emergency department for: low potassium. You can access your current and pending results through [address] [place] [address]. A radiologist will take a second look at your X-Rays, if you had any, and you will be contacted with any new findings. You should follow-up with your primary care provider, within 24 hours, for re-evaluation. If you do not have a primary care provider, I have referred you to [facility]. You will be contacted about scheduling an appointment. Their phone number is also included on this paperwork. You should maintain your appointment with your doctor at [facility]. Your work-up revealed potentially dangerous features at this time and you are leaving [address] [place] [address]. You are welcome to change your mind return to the emergency department at any time. Furthermore, many disease processes are dynamic:    Additionally, please, return to the emergency department if you experience new or worsening symptoms, fever, chest pain, shortness of breath, difficulty breathing, dizziness, abdominal pain, persistent nausea/vomiting, syncope or passing out, blood in your urine or stool, coughing up blood, leg swelling/pain, urinary retention, bowel or bladder incontinence, numbness between your legs. Additionally, your blood pressure was measured to be high. This is something that you should discuss with your primary care provider and have re-checked within one week. CT recon only lumbar spine    Result Date: 9/17/2023  Impression: 1. No acute fracture or subluxation. 2. Mild to moderate spondylosis 3. A few sclerotic lesions described on the concurrent CT of the chest abdomen pelvis may represent scattered bone islands versus sclerotic metastases versus other etiologies. Further clinical assessment and follow-up recommended.  If there is a high clinical suspicion for underlying malignancy, whole body bone scan could be obtained for further characterization. I personally discussed this study with Philly Timmons on 9/17/2023 12:47 PM. Workstation performed: [id number]     CT chest abdomen pelvis w contrast    Result Date: 9/17/2023  Impression: 1. Patchy density in the right upper lobe having appearance of pneumonia, potentially an aspiration pneumonia given the marked distention of the esophagus. 2. Marked distention of the distal half of the esophagus with air-fluid level proximal to a gastric lap band. Further clinical assessment and follow-up advised. Is the gastric lap band to tight? 3. 2 small indeterminate sclerotic lesions in the left iliac bone and right ischium potentially bone islands. However if there is a strong clinical suspicion for underlying malignancy nonemergent whole-body bone scan could be obtained for further characterization. I personally discussed this study with Philly Tmimons on 9/17/2023 12:41 PM. Workstation performed: [id number]     CT spine cervical without contrast    Result Date: 9/17/2023  Impression: 1. Marked distention of the visualized portions of the mid esophagus with air-fluid level. Findings may be related to achalasia or obstructing esophageal lesion versus severe reflux. Further clinical assessment advised. See separate CT of the chest, abdomen and pelvis. 2.  No cervical spine fracture or traumatic malalignment. Workstation performed: [id number]     CT head without contrast    Result Date: 9/17/2023  Impression: No intracranial hemorrhage or calvarial fracture.  Trace, chronic appearing microangiopathy Workstation performed: [id number]

## 2023-09-17 NOTE — SEPSIS NOTE
Sepsis Note   Nataly Dash 67 y.o. female MRN: [id number]  Unit/Bed#: ED-36 Encounter: [id number]       Initial Sepsis Screening     [address] Name 09/17/23 1315                Is the patient's history suggestive of a new or worsening infection? Yes (Proceed)  -CL        Suspected source of infection pneumonia  -CL        Indicate SIRS criteria Tachycardia > 90 bpm;Leukocytosis (WBC > 31416 IJL) OR Leukopenia (WBC <4000 IJL) OR Bandemia (WBC >10% bands)  -CL        Are two or more of the above signs & symptoms of infection both present and new to the patient? Yes (Proceed)  -CL        Assess for evidence of organ dysfunction: Are any of the below criteria present within 6 hours of suspected infection and SIRS criteria that are NOT considered to be chronic conditions? --              User Key  (r) = Recorded By, (t) = Taken By, (c) = Cosigned By    [address] [place] [address] Name Provider Celina Landry MD Physician                    There is no height or weight on file to calculate BMI.   Wt Readings from Last 1 Encounters:   09/01/23 73.6 kg (162 lb 3.2 oz)        Ideal body weight: 47.8 kg (105 lb 6.1 oz)  Adjusted ideal body weight: 58.1 kg (128 lb 1.7 oz)

## 2023-09-18 ENCOUNTER — TELEPHONE (OUTPATIENT)
Dept: PHYSICAL THERAPY | Facility: OTHER | Age: 73
End: 2023-09-18

## 2023-09-18 LAB
ATRIAL RATE: 85 BPM
P AXIS: 44 DEGREES
PR INTERVAL: 186 MS
QRS AXIS: -43 DEGREES
QRSD INTERVAL: 74 MS
QT INTERVAL: 378 MS
QTC INTERVAL: 449 MS
T WAVE AXIS: -26 DEGREES
VENTRICULAR RATE: 85 BPM

## 2023-09-18 PROCEDURE — 93010 ELECTROCARDIOGRAM REPORT: CPT | Performed by: INTERNAL MEDICINE

## 2023-09-18 NOTE — TELEPHONE ENCOUNTER
Call place to the patient per [facility] referral.    V/M left for patient to call back. Phone number and hours of business provided. This is the 1st attempt to reach the patient. Will defer referral per protocol.

## 2023-09-20 ENCOUNTER — TELEPHONE (OUTPATIENT)
Dept: NEPHROLOGY | Facility: CLINIC | Age: 73
End: 2023-09-20

## 2023-09-20 LAB — BACTERIA UR CULT: ABNORMAL

## 2023-09-20 NOTE — TELEPHONE ENCOUNTER
Srini Mccracken would like follow thru with Urology. She was in ER and 9-23-23. Has Pnemonia and UTI and they treated her. At this time does not with to follow with Nephrology.

## 2023-09-21 ENCOUNTER — RA CDI HCC (OUTPATIENT)
Dept: OTHER | Facility: HOSPITAL | Age: 73
End: 2023-09-21

## 2023-09-21 NOTE — PROGRESS NOTES
[address] [facility] coding opportunities       Chart reviewed, no opportunity found: [address]        Patients Insurance     Medicare Insurance: [employer] Ad[employer]age

## 2023-09-22 LAB
BACTERIA BLD CULT: NORMAL
BACTERIA BLD CULT: NORMAL

## 2023-09-25 ENCOUNTER — TELEPHONE (OUTPATIENT)
Dept: NEPHROLOGY | Facility: CLINIC | Age: 73
End: 2023-09-25

## 2023-09-25 RX ORDER — ONDANSETRON 4 MG/1
TABLET, ORALLY DISINTEGRATING ORAL
COMMUNITY
End: 2023-09-26

## 2023-09-25 RX ORDER — FLUCONAZOLE 150 MG/1
TABLET ORAL
COMMUNITY
End: 2023-09-26

## 2023-09-25 RX ORDER — OSELTAMIVIR PHOSPHATE 75 MG/1
CAPSULE ORAL
COMMUNITY
End: 2023-09-26

## 2023-09-25 RX ORDER — AMOXICILLIN 500 MG/1
CAPSULE ORAL
COMMUNITY
Start: 2023-08-10

## 2023-09-25 RX ORDER — SPIRONOLACTONE 25 MG/1
TABLET ORAL
COMMUNITY
Start: 2023-09-15 | End: 2023-09-26

## 2023-09-25 RX ORDER — POTASSIUM CHLORIDE 750 MG/1
TABLET, EXTENDED RELEASE ORAL
COMMUNITY
Start: 2023-09-15 | End: 2023-09-26

## 2023-09-25 RX ORDER — TROSPIUM CHLORIDE 20 MG/1
1 TABLET, FILM COATED ORAL 2 TIMES DAILY
COMMUNITY
End: 2023-09-26

## 2023-09-25 NOTE — TELEPHONE ENCOUNTER
Called patient and went over the above message. Dr. Madison Jara patient stating that she already have IV for potassium. Andreyie Slider also said thank you so much Dr. Madison Jara for all you did and the care provide to her.

## 2023-09-25 NOTE — TELEPHONE ENCOUNTER
----- Message from Kenia Stoddard MD sent at 9/24/2023  9:37 PM EDT -----  Can you please let know Mrs Ciera Neal that her potassium is low and she should call her PCP as soon as possible. She called the office and she doesn't want to follow up with nephrology.  Thanks

## 2023-09-26 ENCOUNTER — OFFICE VISIT (OUTPATIENT)
Dept: FAMILY MEDICINE CLINIC | Facility: CLINIC | Age: 73
End: 2023-09-26
Payer: COMMERCIAL

## 2023-09-26 VITALS
TEMPERATURE: 98 F | HEART RATE: 82 BPM | SYSTOLIC BLOOD PRESSURE: 130 MMHG | BODY MASS INDEX: 31.34 KG/M2 | OXYGEN SATURATION: 97 % | WEIGHT: 166 LBS | RESPIRATION RATE: 16 BRPM | HEIGHT: 61 IN | DIASTOLIC BLOOD PRESSURE: 90 MMHG

## 2023-09-26 DIAGNOSIS — S85.001S INJURY OF RIGHT POPLITEAL ARTERY, SEQUELA: ICD-10-CM

## 2023-09-26 DIAGNOSIS — E87.6 HYPOKALEMIA: ICD-10-CM

## 2023-09-26 DIAGNOSIS — Z98.84 STATUS POST BARIATRIC SURGERY: ICD-10-CM

## 2023-09-26 DIAGNOSIS — I72.9 PSEUDOANEURYSM (HCC): ICD-10-CM

## 2023-09-26 DIAGNOSIS — I72.4 PSEUDOANEURYSM OF FEMORAL ARTERY (HCC): ICD-10-CM

## 2023-09-26 DIAGNOSIS — N39.0 URINARY TRACT INFECTION WITHOUT HEMATURIA, SITE UNSPECIFIED: ICD-10-CM

## 2023-09-26 DIAGNOSIS — K58.0 IRRITABLE BOWEL SYNDROME WITH DIARRHEA: ICD-10-CM

## 2023-09-26 DIAGNOSIS — R09.82 PND (POST-NASAL DRIP): ICD-10-CM

## 2023-09-26 DIAGNOSIS — Z13.0 SCREENING FOR DEFICIENCY ANEMIA: ICD-10-CM

## 2023-09-26 DIAGNOSIS — K21.9 GASTROESOPHAGEAL REFLUX DISEASE, UNSPECIFIED WHETHER ESOPHAGITIS PRESENT: Primary | ICD-10-CM

## 2023-09-26 DIAGNOSIS — I73.9 PAD (PERIPHERAL ARTERY DISEASE) (HCC): ICD-10-CM

## 2023-09-26 DIAGNOSIS — R13.10 DYSPHAGIA, UNSPECIFIED TYPE: ICD-10-CM

## 2023-09-26 DIAGNOSIS — I10 BENIGN ESSENTIAL HYPERTENSION: ICD-10-CM

## 2023-09-26 DIAGNOSIS — M19.90 ARTHRITIS: ICD-10-CM

## 2023-09-26 DIAGNOSIS — J30.2 SEASONAL ALLERGIES: ICD-10-CM

## 2023-09-26 DIAGNOSIS — Z13.220 SCREENING CHOLESTEROL LEVEL: ICD-10-CM

## 2023-09-26 DIAGNOSIS — H35.30 AGE-RELATED MACULAR DEGENERATION: ICD-10-CM

## 2023-09-26 DIAGNOSIS — J44.89 OBSTRUCTIVE CHRONIC BRONCHITIS WITHOUT EXACERBATION: ICD-10-CM

## 2023-09-26 DIAGNOSIS — M17.11 UNILATERAL PRIMARY OSTEOARTHRITIS, RIGHT KNEE: ICD-10-CM

## 2023-09-26 DIAGNOSIS — H25.9 AGE-RELATED CATARACT OF BOTH EYES, UNSPECIFIED AGE-RELATED CATARACT TYPE: ICD-10-CM

## 2023-09-26 PROBLEM — Z80.0 FAMILY HISTORY OF LYNCH SYNDROME: Status: ACTIVE | Noted: 2022-10-21

## 2023-09-26 PROBLEM — E78.5 HLD (HYPERLIPIDEMIA): Status: ACTIVE | Noted: 2019-12-03

## 2023-09-26 PROBLEM — J40 TRACHEOBRONCHITIS: Status: RESOLVED | Noted: 2023-06-02 | Resolved: 2023-09-26

## 2023-09-26 PROBLEM — E66.9 OBESITY WITH BODY MASS INDEX 30 OR GREATER: Status: ACTIVE | Noted: 2018-04-19

## 2023-09-26 PROBLEM — F41.8 MIXED ANXIETY DEPRESSIVE DISORDER: Status: ACTIVE | Noted: 2018-04-19

## 2023-09-26 PROBLEM — E66.01 MORBID OBESITY (HCC): Status: RESOLVED | Noted: 2021-09-24 | Resolved: 2023-09-26

## 2023-09-26 PROCEDURE — 99215 OFFICE O/P EST HI 40 MIN: CPT | Performed by: FAMILY MEDICINE

## 2023-09-26 RX ORDER — VALSARTAN 40 MG/1
40 TABLET ORAL DAILY
Qty: 30 TABLET | Refills: 0 | Status: SHIPPED | OUTPATIENT
Start: 2023-09-26

## 2023-09-26 RX ORDER — AZELASTINE 1 MG/ML
1 SPRAY, METERED NASAL 2 TIMES DAILY
Qty: 30 ML | Refills: 0 | Status: SHIPPED | OUTPATIENT
Start: 2023-09-26

## 2023-09-26 NOTE — PATIENT INSTRUCTIONS
Please do not take the spironolactone that was previously ordered by your prior PCP. I do not believe that this is necessary. I would like you to take the Diovan that you have at home and take your blood pressure and write it down. I do agree with the 40 mg tablets    Please obtain the labs that I have ordered for you in about a week to 5 days. Early next week would be best so that we have some time to recover. Discontinue the Levaquin after tomorrow. Make sure you are monitoring your muscle aches and pains after that so that we can contribute it to the medication versus follow-up something else.

## 2023-09-26 NOTE — PROGRESS NOTES
New Patient Outpatient Note    HPI:     Rubén Machado, 67 y.o. female  presents today as a new patient and to establish care. 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 Patient was recently ill about 3-4 weeks prior to the ED visit on 9/17/2023. She was having nausea and non bloody emesis. Labs were obtained due to continued symptoms with subjective fever, cough, and myaglias. The patient presented to the ED with a potassium of 2.7. While present in the ED and during work up she was also found to have PNA. Her potassium was repleted and was up to 3.1 prior to leaving the ED. She was discharged on Levaquin 2/2 to dual coverage of both UTI and PNA. She is on day 9 of 14. She has had resolution of urinary symptoms but she never had any significant symptoms of sputum production, shortness of breath or chest pain. During her illness she did fall onto her right lower extremity and it is currently healing. There is some swelling and bruising. After discharge she has been having increased muscle aches and pains. She feels it all over in her joints. She has a history of fibro but this "100x" worse. She has also completed her full course of oral potassium and prior PCP placed her on a potassium sparing diuretic. She is looking to transfer care to another site. She also follows closely with her bariatric surgeon. She discussed prior imaging with them and they feel that the sclerotic lesions are likely 2/2 to arthritis due to severe history. Family Hx  UTD in chart    Past Medical History:   Diagnosis Date   • Abnormal ECG 2010 ?    • Arthritis    • Asthma    • Chronic pain disorder    • Colon polyp    • COPD (chronic obstructive pulmonary disease) (HCC)    • Coronary artery disease    • Fibromyalgia, primary • GERD (gastroesophageal reflux disease)    • High blood pressure    • History of transfusion    • Hyperlipidemia    • Hypertension    • Inflammatory bowel disease 2002   • Irritable bowel syndrome    • Kidney stone    • Myocardial infarction [facility]) ? Silent heart attack   • Pneumonia 2023   • PONV (postoperative nausea and vomiting)    • Urinary tract infection Past 4 years    Frequent   • Varicella    • Visual impairment 2023    Cataracts, dry, macular degeneration        Past Surgical History:   Procedure Laterality Date   • BARIATRIC SURGERY     • COLONOSCOPY     • GASTROSTOMY     • HYSTERECTOMY Bilateral     complete  age 54   • IR IVC FILTER REMOVAL  12/07/2015   • IR LOWER EXTREMITY ANGIOGRAM  08/15/2022   • JOINT REPLACEMENT      bilateral knee   • LAPAROSCOPIC GASTRIC BANDING     • POPLITEAL ARTERY STENT      2016   • [place] SLCTV CATHJ 3RD+ ORD SLCTV ABDL PEL/LXTR [facility] Right 08/15/2022    Procedure:  Aortogram RLE runoff L CFA access w/ 6F sheath and mynx closure R popliteal PTA w/ 4x60mm  balloon;  Surgeon: Maxime Shin MD;  Location: [facility];  Service: Vascular   • REPLACEMENT TOTAL KNEE      2016   • TONSILLECTOMY     • US GUIDED VASCULAR ACCESS  12/07/2015          Current Outpatient Medications:   •  ALPRAZolam (XANAX) 0.25 mg tablet, , Disp: , Rfl:   •  amoxicillin (AMOXIL) 500 mg capsule, TAKE 4 CAPSULES BY MOUTH 1 HOUR PRIOR TO DENTAL WORK, Disp: , Rfl:   •  aspirin (ECOTRIN LOW STRENGTH) 81 mg EC tablet, Take 81 mg by mouth daily, Disp: , Rfl:   •  Aspirin-Acetaminophen-Caffeine (EXCEDRIN PO), Take by mouth daily, Disp: , Rfl:   •  azelastine (ASTELIN) 0.1 % nasal spray, 1 spray into each nostril 2 (two) times a day Use in each nostril as directed, Disp: 30 mL, Rfl: 0  •  clobetasol (TEMOVATE) 0.05 % cream, Apply topically 2 (two) times a day, Disp: 45 g, Rfl: 2  •  diazepam (VALIUM) 2 mg tablet, Take 2 mg by mouth daily at bedtime, Disp: , Rfl:   •  DULoxetine (CYMBALTA) 20 mg capsule, Take 20 mg by mouth every other day, Disp: , Rfl:   •  esomeprazole (NexIUM) 40 MG capsule, TAKE 1 CAPSULE BY MOUTH IN THE  MORNING TAKE 1/2 HOUR PRIOR TO  BREAKFAST, Disp: 90 capsule, Rfl: 3  •  levofloxacin (LEVAQUIN) 750 mg tablet, Take 1 tablet (750 mg total) by mouth every 24 hours for 13 days Do not start before September 18, 2023., Disp: 13 tablet, Rfl: 0  •  valsartan (DIOVAN) 80 mg tablet, Take 80 mg by mouth daily, Disp: , Rfl:   •  cholestyramine (QUESTRAN) 4 g packet, Take 1 packet (4 g total) by mouth daily at bedtime, Disp: 90 packet, Rfl: 3  •  tiotropium (Spiriva HandiHaler) 18 mcg inhalation capsule, Place 18 mcg into inhaler and inhale, Disp: , Rfl:      SOCIAL:   Rent/Own: Own  Currently living with: Alone  Stable food: Yes  Safe At Home: Yes  Hobbies: therapy, reading, reading group, Judaism  Profession/ employment: retired- teacher, therapist  Restriction to medical procedures:  None    SEXUAL HISTORY:   Preference:  Men  Sexually Active: Not currently  Birth Control: NA    Psychological History  Psychiatric history: None  History of inpatient:  None  Current Therapy/ Provider:  None  Current Medications:  None    Substance History  Smoking: None  Alcohol Use: None  Substance use:  None    ROS:   Review of Systems   Constitutional: Negative for chills, fever and unexpected weight change. Night sweats, resolved. HENT: Positive for postnasal drip and rhinorrhea. Negative for congestion, ear discharge, ear pain, hearing loss, sinus pressure, sinus pain and sore throat. Eyes: Positive for visual disturbance (weras glasses, dry macular degeneration, cataracts). Respiratory: Negative for cough, chest tightness and shortness of breath. Cardiovascular: Negative for chest pain and palpitations. Gastrointestinal: Positive for diarrhea (occasional). Negative for abdominal distention, abdominal pain, blood in stool, constipation, nausea and vomiting.    Genitourinary: Negative for dysuria and frequency. Musculoskeletal: Positive for arthralgias, myalgias and neck pain. Skin: Negative for rash and wound. Neurological: Positive for headaches. Negative for dizziness and light-headedness. Psychiatric/Behavioral: Negative for self-injury and suicidal ideas. OBJECTIVE  Vitals:    09/26/23 0840   BP: 130/90   Pulse: 82   Resp: 16   Temp: 98 °F (36.7 °C)   SpO2: 97%        Physical Exam  Constitutional:       General: She is not in acute distress. Appearance: Normal appearance. She is not ill-appearing, toxic-appearing or diaphoretic. HENT:      Head: Normocephalic and atraumatic. Comments: Small bruise under chin     Right Ear: Tympanic membrane, ear canal and external ear normal. There is no impacted cerumen. Left Ear: Tympanic membrane, ear canal and external ear normal. There is no impacted cerumen. Nose: Nose normal. No congestion or rhinorrhea. Mouth/Throat:      Mouth: Mucous membranes are moist.      Pharynx: No oropharyngeal exudate or posterior oropharyngeal erythema. Eyes:      General:         Right eye: No discharge. Left eye: No discharge. Extraocular Movements: Extraocular movements intact. Pupils: Pupils are equal, round, and reactive to light. Cardiovascular:      Rate and Rhythm: Normal rate and regular rhythm. Heart sounds: Normal heart sounds. No murmur heard. No friction rub. No gallop. Pulmonary:      Effort: Pulmonary effort is normal. No respiratory distress. Breath sounds: Normal breath sounds. No stridor. No wheezing, rhonchi or rales. Abdominal:      General: Bowel sounds are normal. There is no distension. Palpations: Abdomen is soft. Tenderness: There is no abdominal tenderness. Musculoskeletal:      Cervical back: Neck supple. Skin:     General: Skin is warm. Capillary Refill: Capillary refill takes less than 2 seconds. Neurological:      Mental Status: She is alert. Motor: Weakness:  limited testing in lower extremities 2/2 to fibro and chronic pain from back. Deep Tendon Reflexes: Reflexes normal (Did not perform). I personally reviewed the last labs and ED visit during visit with patient. ASSESSMENT AND PLAN   Amador Cheadle was seen today for establish care. Diagnoses and all orders for this visit:    Hypokalemia  Possible muscle aches and pain could be from potassium abnormalities. I believe this is likely due to Levaquin. Patient to wait and obtain labs until early next week. She is to stop Levaquin after tomorrow and see if symptoms improve. -     Comprehensive metabolic panel; Future    Gastroesophageal reflux disease, unspecified whether esophagitis present  Irritable bowel syndrome with diarrhea  Dysphagia, unspecified type  Will recommend follow up with GI. I believe patient is already following with local physicians. On imaging there was dilation of mid esophagus that will require follow up. Patient to continue current medication regimen. Benign essential hypertension  Patient to continue with current dosing of diovan 40mg . She is to monitor blood pressures at home. Will follow up with patient. -     Comprehensive metabolic panel; Future    Pseudoaneurysm of femoral artery (HCC)  PAD (peripheral artery disease) (HCC)  Injury of right popliteal artery, sequela  Follows with vascular, Dr. Lynne Ramírez Doctor. She monitors her graft after surgical complication. Age-related macular degeneration  Age-related cataract of both eyes, unspecified age-related cataract type  Follows with ophthalamology. Has been told previously that she has had early onset macular degeneration. She would like follow up with specialist.      Status post bariatric surgery  Patient had banding surgery. Recent reduced pressure/ restriction per patient. Follows with bariatrics regularly. Appreciate recommendations. Defer to specialist care. Arthritis  Unilateral primary osteoarthritis, right knee  Severe arthritis in back and lower extremities. She is interested in follow up PT. She has performed in past with good results/ benefits.   -     Ambulatory Referral to Physical Therapy; Future    Seasonal allergies  PND (post-nasal drip)  Patient concerned about nightly PND. She has increased drainage and it will occasionally wake her up at night. Recommended topical antihistamine.   -     azelastine (ASTELIN) 0.1 % nasal spray; 1 spray into each nostril 2 (two) times a day Use in each nostril as directed    Screening for deficiency anemia  Screening cholesterol level  Prior labs were obtained in the ED. Will repeat outside of acute setting. Lipids and CBC require follow up for baselines. -     Lipid panel; Future  -     CBC and differential; Future    Urinary tract infection without hematuria, site unspecified  patient has recurrent UTIs. I will treat if positive for symptoms and UA. Recommended standing orders for symptoms. Patient to obtain labs if starting to have increased burning, pain, or frequency. -     UA w Reflex to Microscopic w Reflex to Culture -Lab Collect; Standing  -     UA w Reflex to Microscopic w Reflex to Culture -Lab Collect    Obstructive chronic bronchitis without exacerbation (HCC)  Not currently on any maintenance inhalers. She is on spiriva intermittently as needed. She also has rescue inhaler, albuterol as needed.         Gilson Carballo DO  [facility]  9/26/2023 9:37 AM

## 2023-10-04 ENCOUNTER — APPOINTMENT (OUTPATIENT)
Dept: LAB | Facility: CLINIC | Age: 73
End: 2023-10-04
Payer: COMMERCIAL

## 2023-10-04 DIAGNOSIS — E87.6 HYPOKALEMIA: ICD-10-CM

## 2023-10-04 DIAGNOSIS — I10 BENIGN ESSENTIAL HYPERTENSION: ICD-10-CM

## 2023-10-04 DIAGNOSIS — Z13.220 SCREENING CHOLESTEROL LEVEL: ICD-10-CM

## 2023-10-04 DIAGNOSIS — Z13.0 SCREENING FOR DEFICIENCY ANEMIA: ICD-10-CM

## 2023-10-04 LAB
ALBUMIN SERPL BCP-MCNC: 3.8 G/DL (ref 3.5–5)
ALP SERPL-CCNC: 52 U/L (ref 34–104)
ALT SERPL W P-5'-P-CCNC: 13 U/L (ref 7–52)
ANION GAP SERPL CALCULATED.3IONS-SCNC: 9 MMOL/L
AST SERPL W P-5'-P-CCNC: 22 U/L (ref 13–39)
BASOPHILS # BLD AUTO: 0.07 THOUSANDS/ÂΜL (ref 0–0.1)
BASOPHILS NFR BLD AUTO: 1 % (ref 0–1)
BILIRUB SERPL-MCNC: 0.58 MG/DL (ref 0.2–1)
BUN SERPL-MCNC: 14 MG/DL (ref 5–25)
CALCIUM SERPL-MCNC: 9.3 MG/DL (ref 8.4–10.2)
CHLORIDE SERPL-SCNC: 103 MMOL/L (ref 96–108)
CHOLEST SERPL-MCNC: 291 MG/DL
CO2 SERPL-SCNC: 28 MMOL/L (ref 21–32)
CREAT SERPL-MCNC: 0.66 MG/DL (ref 0.6–1.3)
EOSINOPHIL # BLD AUTO: 0.2 THOUSAND/ÂΜL (ref 0–0.61)
EOSINOPHIL NFR BLD AUTO: 3 % (ref 0–6)
ERYTHROCYTE [DISTWIDTH] IN BLOOD BY AUTOMATED COUNT: 15.4 % (ref 11.6–15.1)
GFR SERPL CREATININE-BSD FRML MDRD: 88 ML/MIN/1.73SQ M
GLUCOSE P FAST SERPL-MCNC: 83 MG/DL (ref 65–99)
HCT VFR BLD AUTO: 37 % (ref 34.8–46.1)
HDLC SERPL-MCNC: 95 MG/DL
HGB BLD-MCNC: 11.7 G/DL (ref 11.5–15.4)
IMM GRANULOCYTES # BLD AUTO: 0.01 THOUSAND/UL (ref 0–0.2)
IMM GRANULOCYTES NFR BLD AUTO: 0 % (ref 0–2)
LDLC SERPL CALC-MCNC: 180 MG/DL (ref 0–100)
LYMPHOCYTES # BLD AUTO: 1.54 THOUSANDS/ÂΜL (ref 0.6–4.47)
LYMPHOCYTES NFR BLD AUTO: 25 % (ref 14–44)
MCH RBC QN AUTO: 29.3 PG (ref 26.8–34.3)
MCHC RBC AUTO-ENTMCNC: 31.6 G/DL (ref 31.4–37.4)
MCV RBC AUTO: 93 FL (ref 82–98)
MONOCYTES # BLD AUTO: 0.36 THOUSAND/ÂΜL (ref 0.17–1.22)
MONOCYTES NFR BLD AUTO: 6 % (ref 4–12)
NEUTROPHILS # BLD AUTO: 3.89 THOUSANDS/ÂΜL (ref 1.85–7.62)
NEUTS SEG NFR BLD AUTO: 65 % (ref 43–75)
NONHDLC SERPL-MCNC: 196 MG/DL
NRBC BLD AUTO-RTO: 0 /100 WBCS
PLATELET # BLD AUTO: 412 THOUSANDS/UL (ref 149–390)
PMV BLD AUTO: 8.8 FL (ref 8.9–12.7)
POTASSIUM SERPL-SCNC: 4.3 MMOL/L (ref 3.5–5.3)
PROT SERPL-MCNC: 6.6 G/DL (ref 6.4–8.4)
RBC # BLD AUTO: 4 MILLION/UL (ref 3.81–5.12)
SODIUM SERPL-SCNC: 140 MMOL/L (ref 135–147)
TRIGL SERPL-MCNC: 78 MG/DL
WBC # BLD AUTO: 6.07 THOUSAND/UL (ref 4.31–10.16)

## 2023-10-04 PROCEDURE — 36415 COLL VENOUS BLD VENIPUNCTURE: CPT

## 2023-10-04 PROCEDURE — 85025 COMPLETE CBC W/AUTO DIFF WBC: CPT

## 2023-10-04 PROCEDURE — 80061 LIPID PANEL: CPT

## 2023-10-04 PROCEDURE — 80053 COMPREHEN METABOLIC PANEL: CPT

## 2023-10-05 ENCOUNTER — TELEPHONE (OUTPATIENT)
Age: 73
End: 2023-10-05

## 2023-10-05 ENCOUNTER — APPOINTMENT (OUTPATIENT)
Dept: LAB | Facility: CLINIC | Age: 73
End: 2023-10-05
Payer: COMMERCIAL

## 2023-10-05 LAB
BILIRUB UR QL STRIP: NEGATIVE
CLARITY UR: CLEAR
COLOR UR: NORMAL
GLUCOSE UR STRIP-MCNC: NEGATIVE MG/DL
HGB UR QL STRIP.AUTO: NEGATIVE
KETONES UR STRIP-MCNC: NEGATIVE MG/DL
LEUKOCYTE ESTERASE UR QL STRIP: NEGATIVE
NITRITE UR QL STRIP: NEGATIVE
PH UR STRIP.AUTO: 6 [PH]
PROT UR STRIP-MCNC: NEGATIVE MG/DL
SP GR UR STRIP.AUTO: 1.01 (ref 1–1.03)
UROBILINOGEN UR STRIP-ACNC: <2 MG/DL

## 2023-10-05 PROCEDURE — 81003 URINALYSIS AUTO W/O SCOPE: CPT | Performed by: FAMILY MEDICINE

## 2023-10-06 NOTE — TELEPHONE ENCOUNTER
Called patient and reviewed RSV as well as many other topics. I do agree that she would be a good candidate for RSV vaccines since she does have underlying COPD and has a history of pneumonia in the past.    We reviewed all of her labs as well. Thankfully the majority of them are within normal limits. We did review the elevated cholesterol and platelets. I think that we can hold on the platelets since they are likely an acute phase reactant from her recent illness. The cholesterol is quite elevated. We reviewed that this does increase the risk of cardiovascular issues. I went through the pros and cons of statin medications/possible Zetia. I sent him information for her to review with her daughter-in-law and herself. There would be pros and cons to starting a statin medication, but I believe this should be more of a shared decision making discussion. She has a history of severe osteoarthritis and fibromyalgia which may be exacerbated by the pravastatin or Zetia. We will have patient review and will call back with any questions or concerns.

## 2023-10-19 DIAGNOSIS — I10 BENIGN ESSENTIAL HYPERTENSION: ICD-10-CM

## 2023-10-19 RX ORDER — VALSARTAN 40 MG/1
40 TABLET ORAL DAILY
Qty: 90 TABLET | Refills: 1 | Status: SHIPPED | OUTPATIENT
Start: 2023-10-19

## 2023-10-20 ENCOUNTER — TELEPHONE (OUTPATIENT)
Age: 73
End: 2023-10-20
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
## 2023-10-20 NOTE — TELEPHONE ENCOUNTER
Ill place the XR but I cannot promise it will be covered by insurance since there is no documentation or physical examination. Please call her and inform her about this caveat.   If she is hesitant I will happily see her in office    Panda Felix DO  [facility]  10/20/2023 4:51 PM

## 2023-10-20 NOTE — TELEPHONE ENCOUNTER
Patient called and stated she fell a month ago at [employer] and is having ongoing b/l shoulder pain and is requesting a order for an xray. Offered patient a appt patient refused. Please advise.

## 2023-10-30 DIAGNOSIS — J30.2 SEASONAL ALLERGIES: ICD-10-CM

## 2023-10-30 DIAGNOSIS — R09.82 PND (POST-NASAL DRIP): ICD-10-CM

## 2023-10-30 RX ORDER — AZELASTINE HYDROCHLORIDE 137 UG/1
SPRAY, METERED NASAL
Qty: 30 ML | Refills: 1 | Status: SHIPPED | OUTPATIENT
Start: 2023-10-30

## 2023-11-15 ENCOUNTER — APPOINTMENT (OUTPATIENT)
Dept: LAB | Facility: CLINIC | Age: 73
End: 2023-11-15
Payer: COMMERCIAL

## 2023-11-15 ENCOUNTER — OFFICE VISIT (OUTPATIENT)
Dept: FAMILY MEDICINE CLINIC | Facility: CLINIC | Age: 73
End: 2023-11-15
Payer: COMMERCIAL

## 2023-11-15 VITALS
DIASTOLIC BLOOD PRESSURE: 70 MMHG | HEIGHT: 61 IN | OXYGEN SATURATION: 97 % | HEART RATE: 70 BPM | BODY MASS INDEX: 34.17 KG/M2 | WEIGHT: 181 LBS | SYSTOLIC BLOOD PRESSURE: 120 MMHG

## 2023-11-15 DIAGNOSIS — M89.9 LYTIC BONE LESIONS ON XRAY: ICD-10-CM

## 2023-11-15 DIAGNOSIS — M79.602 BILATERAL ARM PAIN: ICD-10-CM

## 2023-11-15 DIAGNOSIS — E87.6 HYPOKALEMIA: ICD-10-CM

## 2023-11-15 DIAGNOSIS — Z11.59 NEED FOR HEPATITIS C SCREENING TEST: ICD-10-CM

## 2023-11-15 DIAGNOSIS — J41.0 SIMPLE CHRONIC BRONCHITIS (HCC): ICD-10-CM

## 2023-11-15 DIAGNOSIS — N39.46 MIXED STRESS AND URGE URINARY INCONTINENCE: ICD-10-CM

## 2023-11-15 DIAGNOSIS — M25.619 DECREASED RANGE OF MOTION OF SHOULDER, UNSPECIFIED LATERALITY: ICD-10-CM

## 2023-11-15 DIAGNOSIS — R29.898 DECREASED STRENGTH OF UPPER EXTREMITY: ICD-10-CM

## 2023-11-15 DIAGNOSIS — M25.511 ACUTE PAIN OF BOTH SHOULDERS: ICD-10-CM

## 2023-11-15 DIAGNOSIS — D75.839 THROMBOCYTOSIS: ICD-10-CM

## 2023-11-15 DIAGNOSIS — M79.601 BILATERAL ARM PAIN: ICD-10-CM

## 2023-11-15 DIAGNOSIS — Z00.00 MEDICARE ANNUAL WELLNESS VISIT, SUBSEQUENT: ICD-10-CM

## 2023-11-15 DIAGNOSIS — E78.2 MIXED HYPERLIPIDEMIA: Primary | ICD-10-CM

## 2023-11-15 DIAGNOSIS — M25.512 ACUTE PAIN OF BOTH SHOULDERS: ICD-10-CM

## 2023-11-15 LAB
ALBUMIN SERPL BCP-MCNC: 4.1 G/DL (ref 3.5–5)
ALP SERPL-CCNC: 70 U/L (ref 34–104)
ALT SERPL W P-5'-P-CCNC: 10 U/L (ref 7–52)
ANION GAP SERPL CALCULATED.3IONS-SCNC: 8 MMOL/L
AST SERPL W P-5'-P-CCNC: 16 U/L (ref 13–39)
BASOPHILS # BLD AUTO: 0.07 THOUSANDS/ÂΜL (ref 0–0.1)
BASOPHILS NFR BLD AUTO: 1 % (ref 0–1)
BILIRUB SERPL-MCNC: 0.54 MG/DL (ref 0.2–1)
BUN SERPL-MCNC: 14 MG/DL (ref 5–25)
CALCIUM SERPL-MCNC: 10.9 MG/DL (ref 8.4–10.2)
CHLORIDE SERPL-SCNC: 99 MMOL/L (ref 96–108)
CK SERPL-CCNC: 24 U/L (ref 26–192)
CO2 SERPL-SCNC: 30 MMOL/L (ref 21–32)
CREAT SERPL-MCNC: 0.64 MG/DL (ref 0.6–1.3)
CRP SERPL QL: 3.6 MG/L
EOSINOPHIL # BLD AUTO: 0.17 THOUSAND/ÂΜL (ref 0–0.61)
EOSINOPHIL NFR BLD AUTO: 3 % (ref 0–6)
ERYTHROCYTE [DISTWIDTH] IN BLOOD BY AUTOMATED COUNT: 13.9 % (ref 11.6–15.1)
ERYTHROCYTE [SEDIMENTATION RATE] IN BLOOD: 29 MM/HOUR (ref 0–29)
GFR SERPL CREATININE-BSD FRML MDRD: 88 ML/MIN/1.73SQ M
GLUCOSE P FAST SERPL-MCNC: 83 MG/DL (ref 65–99)
HCT VFR BLD AUTO: 37.8 % (ref 34.8–46.1)
HCV AB SER QL: NORMAL
HGB BLD-MCNC: 12 G/DL (ref 11.5–15.4)
IMM GRANULOCYTES # BLD AUTO: 0.01 THOUSAND/UL (ref 0–0.2)
IMM GRANULOCYTES NFR BLD AUTO: 0 % (ref 0–2)
LYMPHOCYTES # BLD AUTO: 2.03 THOUSANDS/ÂΜL (ref 0.6–4.47)
LYMPHOCYTES NFR BLD AUTO: 32 % (ref 14–44)
MCH RBC QN AUTO: 29.3 PG (ref 26.8–34.3)
MCHC RBC AUTO-ENTMCNC: 31.7 G/DL (ref 31.4–37.4)
MCV RBC AUTO: 92 FL (ref 82–98)
MONOCYTES # BLD AUTO: 0.41 THOUSAND/ÂΜL (ref 0.17–1.22)
MONOCYTES NFR BLD AUTO: 6 % (ref 4–12)
NEUTROPHILS # BLD AUTO: 3.72 THOUSANDS/ÂΜL (ref 1.85–7.62)
NEUTS SEG NFR BLD AUTO: 58 % (ref 43–75)
NRBC BLD AUTO-RTO: 0 /100 WBCS
PLATELET # BLD AUTO: 360 THOUSANDS/UL (ref 149–390)
PMV BLD AUTO: 9.4 FL (ref 8.9–12.7)
POTASSIUM SERPL-SCNC: 4.1 MMOL/L (ref 3.5–5.3)
PROT SERPL-MCNC: 7.1 G/DL (ref 6.4–8.4)
RBC # BLD AUTO: 4.1 MILLION/UL (ref 3.81–5.12)
SODIUM SERPL-SCNC: 137 MMOL/L (ref 135–147)
WBC # BLD AUTO: 6.41 THOUSAND/UL (ref 4.31–10.16)

## 2023-11-15 PROCEDURE — G0439 PPPS, SUBSEQ VISIT: HCPCS | Performed by: FAMILY MEDICINE

## 2023-11-15 PROCEDURE — 99214 OFFICE O/P EST MOD 30 MIN: CPT | Performed by: FAMILY MEDICINE

## 2023-11-15 PROCEDURE — 86803 HEPATITIS C AB TEST: CPT

## 2023-11-15 PROCEDURE — 83521 IG LIGHT CHAINS FREE EACH: CPT

## 2023-11-15 PROCEDURE — 86140 C-REACTIVE PROTEIN: CPT

## 2023-11-15 PROCEDURE — 84165 PROTEIN E-PHORESIS SERUM: CPT

## 2023-11-15 PROCEDURE — 85652 RBC SED RATE AUTOMATED: CPT

## 2023-11-15 PROCEDURE — 80053 COMPREHEN METABOLIC PANEL: CPT

## 2023-11-15 PROCEDURE — 82550 ASSAY OF CK (CPK): CPT

## 2023-11-15 PROCEDURE — 36415 COLL VENOUS BLD VENIPUNCTURE: CPT

## 2023-11-15 PROCEDURE — 85025 COMPLETE CBC W/AUTO DIFF WBC: CPT

## 2023-11-15 PROCEDURE — 86038 ANTINUCLEAR ANTIBODIES: CPT

## 2023-11-15 NOTE — PROGRESS NOTES
Outpatient Note- Follow up     HPI:     Kevin Barnes , 68 y.o. female  presents today for follow-up for chronic conditions and Medicare wellness visit. Over the last 6 weeks the patient has been experiencing increasing shoulder pain that radiates into the upper extremity. Initially the pain was about a 10 out of 10, today on presentation is 8/10 in intensity. It is a very deep and painful ache. She did have previous imaging after a fall in [employer] This was not necessarily due to the fall, but a low potassium. Despite this she had a full battery of imaging including x-rays and CTs. On the lumbar spine they did find some sclerotic lesions that were associated with previous CT of the abdomen and pelvis. They recommended a whole-body scan for further characterization. After reviewing the imaging with another physician, they stated that this was unlikely malignancy and that she should have hold off on the imaging of the whole-body scan. Additionally with the increase in pain of the upper extremities, the patient is interested in reviewing labs from her thrombocytosis, hypokalemia, and obtaining information associated with the bone lesions. She is also interested in following up with body scan,    Onset of pain:  6 weeks  Intensity of pain: 8/10 currently. Prior  10/10  Location of Pain:  shoulders  Radiation: into upper arms  Character of Pain:  achy  Constant intermittent: constant  Exacerbating factors:  certain positions, laying down it  Relieving Factors:   Associated Symptoms:    -  Erythema, Numbness, tingling  -  Nausea, vomiting  -  Decreased ROM  -  Decreased Strength  -  Red Flag Symptoms-  Numbness and tingling to Genitals/perineum,   increased weakness of LE, Loss of bowel or bladder function.      Inciting Event/ Trauma: unknown, history of previus trauma in 9/2023  Progression:  progressing, getting better but mild  Previous Episodes:  None  Specialist Follow up: None  Hx of Physical Therapy:  None  Prior medications:  multiple topical treatments      Past Medical History:   Diagnosis Date    Abnormal ECG 2010 ? Arthritis     Asthma     Chronic pain disorder     Colon polyp     COPD (chronic obstructive pulmonary disease) (HCC)     Coronary artery disease     Fibromyalgia, primary     GERD (gastroesophageal reflux disease)     High blood pressure     History of transfusion     Hyperlipidemia     Hypertension     Inflammatory bowel disease 2002    Irritable bowel syndrome     Kidney stone     Myocardial infarction [facility]) ? Silent heart attack    Pneumonia 2023    PONV (postoperative nausea and vomiting)     Urinary tract infection Past 4 years    Frequent    Varicella     Visual impairment 2023    Cataracts, dry, macular degeneration      ROS:   Review of Systems   See HPI    OBJECTIVE  Vitals:    11/15/23 1320   BP: 120/70   Pulse: 70   SpO2: 97%        Physical Exam   No PE performed    ASSESSMENT AND PLAN   Srini Mccracken was seen today for medicare wellness visit. Diagnoses and all orders for this visit:    Mixed hyperlipidemia  We previously discussed medication management of her elevated cholesterol. She was considering a satin but due to recent increases in her MSK pain with arthaliga and myaglias I recommended that we review labs prior to starting medicaiton that may make her symptoms worse. Thrombocytosis  Mild elevation in platelets. She is concerned about elevation and is interested in following up value.    -     CBC and differential; Future    Hypokalemia  History of low potassium. Previously taking potassium supplementation. Will evaluate with follow labs to determine if continued supplementation is necessary since she is not currently taking supplement. -     Comprehensive metabolic panel;  Future    Bilateral arm pain  Acute pain of both shoulders  Decreased strength of upper extremity  Decreased range of motion of shoulder, unspecified laterality  Unknown etiology of bilateral arm pain. She is having significant increase in pain and there may be underlying muscle issue vs. Autoimmune issue. Will perform a basic inflammatory/ autoimmune work up. Will also obtain XR of right arm that has he worst pain. Recent imaging demonstrated possible lytic lesions she is now open to looking into this. -     XR humerus right; Future  -     Sedimentation rate, automated; Future  -     C-reactive protein; Future  -     Antinuclear Antibodies (EMELYN), IFA; Future  -     CK; Future    Lytic bone lesions on xray  She is interested in looking into the areas on spine that looked to be possible lytic lesion. Will look into causes including Multiple myeloma. Kappa/light chains, protein electrophoresis, ordered for follow up. -     NM bone scan whole body; Future  -     Sedimentation rate, automated; Future  -     C-reactive protein; Future  -     Antinuclear Antibodies (EMELYN), IFA; Future  -     Protein electrophoresis, serum; Future  -     Kappa/Lambda Light Chains Free With Ratio, Serum; Future  -     CK; Future    Simple chronic bronchitis (HCC)  Currently on spiriva. Well controlled at this time. No significant coughing or shortness of breath. May want to consider PFTs. Mixed stress and urge urinary incontinence  Following up with specialist. Discussed during medicare wellness visit. Patient has plan moving forward. If not improving, consider PT for pelvic floor exercises. Need for hepatitis C screening test  Reviewed during medicare wellness visit. The patient is open to obtaining screening with follow up labs. -     Hepatitis C antibody;  Future         Gagandeep Dominguez DO  [facility]  11/15/2023 2:19 PM

## 2023-11-15 NOTE — PATIENT INSTRUCTIONS
Please make sure you are looking into living will and power of  that this is updated. Medicare Preventive Visit Patient Instructions  Thank you for completing your Welcome to Medicare Visit or Medicare Annual Wellness Visit today. Your next wellness visit will be due in one year (11/15/2024). The screening/preventive services that you may require over the next 5-10 years are detailed below. Some tests may not apply to you based off risk factors and/or age. Screening tests ordered at today's visit but not completed yet may show as past due. Also, please note that scanned in results may not display below. Preventive Screenings:  Service Recommendations Previous Testing/Comments   Colorectal Cancer Screening  * Colonoscopy    * Fecal Occult Blood Test (FOBT)/Fecal Immunochemical Test (FIT)  * Fecal DNA/Cologuard Test  * Flexible Sigmoidoscopy Age: 43-73 years old   Colonoscopy: every 10 years (may be performed more frequently if at higher risk)  OR  FOBT/FIT: every 1 year  OR  Cologuard: every 3 years  OR  Sigmoidoscopy: every 5 years  Screening may be recommended earlier than age 39 if at higher risk for colorectal cancer. Also, an individualized decision between you and your healthcare provider will decide whether screening between the ages of 77-80 would be appropriate. Colonoscopy: 10/07/2021  FOBT/FIT: Not on file  Cologuard: Not on file  Sigmoidoscopy: Not on file    Screening Current     Breast Cancer Screening Age: 36 years old  Frequency: every 1-2 years  Not required if history of left and right mastectomy Mammogram: 09/20/2021        Cervical Cancer Screening Between the ages of 21-29, pap smear recommended once every 3 years. Between the ages of 32-69, can perform pap smear with HPV co-testing every 5 years.    Recommendations may differ for women with a history of total hysterectomy, cervical cancer, or abnormal pap smears in past. Pap Smear: Not on file    Screening Not Indicated Hepatitis C Screening Once for adults born between 1945 and 1965  More frequently in patients at high risk for Hepatitis C Hep C Antibody: Not on file        Diabetes Screening 1-2 times per year if you're at risk for diabetes or have pre-diabetes Fasting glucose: 83 mg/dL (10/4/2023)  A1C: No results in last 5 years (No results in last 5 years)  Screening Current   Cholesterol Screening Once every 5 years if you don't have a lipid disorder. May order more often based on risk factors. Lipid panel: 10/04/2023    Screening Not Indicated  History Lipid Disorder     Other Preventive Screenings Covered by Medicare:  Abdominal Aortic Aneurysm (AAA) Screening: covered once if your at risk. You're considered to be at risk if you have a family history of AAA. Lung Cancer Screening: covers low dose CT scan once per year if you meet all of the following conditions: (1) Age 48-67; (2) No signs or symptoms of lung cancer; (3) Current smoker or have quit smoking within the last 15 years; (4) You have a tobacco smoking history of at least 20 pack years (packs per day multiplied by number of years you smoked); (5) You get a written order from a healthcare provider. Glaucoma Screening: covered annually if you're considered high risk: (1) You have diabetes OR (2) Family history of glaucoma OR (3)  aged 48 and older OR (3)  American aged 72 and older  Osteoporosis Screening: covered every 2 years if you meet one of the following conditions: (1) You're estrogen deficient and at risk for osteoporosis based off medical history and other findings; (2) Have a vertebral abnormality; (3) On glucocorticoid therapy for more than 3 months; (4) Have primary hyperparathyroidism; (5) On osteoporosis medications and need to assess response to drug therapy. Last bone density test (DXA Scan): Not on file. HIV Screening: covered annually if you're between the age of 14-79.  Also covered annually if you are younger than 13 and older than 72 with risk factors for HIV infection. For pregnant patients, it is covered up to 3 times per pregnancy. Immunizations:  Immunization Recommendations   Influenza Vaccine Annual influenza vaccination during flu season is recommended for all persons aged >= 6 months who do not have contraindications   Pneumococcal Vaccine   * Pneumococcal conjugate vaccine = PCV13 (Prevnar 13), PCV15 (Vaxneuvance), PCV20 (Prevnar 20)  * Pneumococcal polysaccharide vaccine = PPSV23 (Pneumovax) Adults 51-87 yo with certain risk factors or if 69+ yo  If never received any pneumonia vaccine: recommend Prevnar 20 (PCV20)  Give PCV20 if previously received 1 dose of PCV13 or PPSV23   Hepatitis B Vaccine 3 dose series if at intermediate or high risk (ex: diabetes, end stage renal disease, liver disease)   Respiratory syncytial virus (RSV) Vaccine - COVERED BY MEDICARE PART D  * RSVPreF3 (Arexvy) CDC recommends that adults 61years of age and older may receive a single dose of RSV vaccine using shared clinical decision-making (SCDM)   Tetanus (Td) Vaccine - COST NOT COVERED BY MEDICARE PART B Following completion of primary series, a booster dose should be given every 10 years to maintain immunity against tetanus. Td may also be given as tetanus wound prophylaxis. Tdap Vaccine - COST NOT COVERED BY MEDICARE PART B Recommended at least once for all adults. For pregnant patients, recommended with each pregnancy.    Shingles Vaccine (Shingrix) - COST NOT COVERED BY MEDICARE PART B  2 shot series recommended in those 19 years and older who have or will have weakened immune systems or those 50 years and older     Health Maintenance Due:      Topic Date Due    Hepatitis C Screening  Never done    Breast Cancer Screening: Mammogram  09/20/2022    Colorectal Cancer Screening  10/06/2024     Immunizations Due:      Topic Date Due    COVID-19 Vaccine (4 - Moderna series) 12/29/2021    Influenza Vaccine (1) 09/01/2023     Advance Directives   What are advance directives? Advance directives are legal documents that state your wishes and plans for medical care. These plans are made ahead of time in case you lose your ability to make decisions for yourself. Advance directives can apply to any medical decision, such as the treatments you want, and if you want to donate organs. What are the types of advance directives? There are many types of advance directives, and each state has rules about how to use them. You may choose a combination of any of the following:  Living will: This is a written record of the treatment you want. You can also choose which treatments you do not want, which to limit, and which to stop at a certain time. This includes surgery, medicine, IV fluid, and tube feedings. Durable power of  for [facility]): This is a written record that states who you want to make healthcare choices for you when you are unable to make them for yourself. This person, called a proxy, is usually a family member or a friend. You may choose more than 1 proxy. Do not resuscitate (DNR) order:  A DNR order is used in case your heart stops beating or you stop breathing. It is a request not to have certain forms of treatment, such as CPR. A DNR order may be included in other types of advance directives. Medical directive: This covers the care that you want if you are in a coma, near death, or unable to make decisions for yourself. You can list the treatments you want for each condition. Treatment may include pain medicine, surgery, blood transfusions, dialysis, IV or tube feedings, and a ventilator (breathing machine). Values history: This document has questions about your views, beliefs, and how you feel and think about life. This information can help others choose the care that you would choose. Why are advance directives important? An advance directive helps you control your care.  Although spoken wishes may be used, it is better to have your wishes written down. Spoken wishes can be misunderstood, or not followed. Treatments may be given even if you do not want them. An advance directive may make it easier for your family to make difficult choices about your care. Fall Prevention    Fall prevention  includes ways to make your home and other areas safer. It also includes ways you can move more carefully to prevent a fall. Health conditions that cause changes in your blood pressure, vision, or muscle strength and coordination may increase your risk for falls. Medicines may also increase your risk for falls if they make you dizzy, weak, or sleepy. Fall prevention tips:   Stand or sit up slowly. Use assistive devices as directed. Wear shoes that fit well and have soles that . Wear a personal alarm. Stay active. Manage your medical conditions. Home Safety Tips:  Add items to prevent falls in the bathroom. Keep paths clear. Install bright lights in your home. Keep items you use often on shelves within reach. Paint or place reflective tape on the edges of your stairs. Urinary Incontinence   Urinary incontinence (UI)  is when you lose control of your bladder. UI develops because your bladder cannot store or empty urine properly. The 3 most common types of UI are stress incontinence, urge incontinence, or both. Medicines:   May be given to help strengthen your bladder control. Report any side effects of medication to your healthcare provider. Do pelvic muscle exercises often:  Your pelvic muscles help you stop urinating. Squeeze these muscles tight for 5 seconds, then relax for 5 seconds. Gradually work up to squeezing for 10 seconds. Do 3 sets of 15 repetitions a day, or as directed. This will help strengthen your pelvic muscles and improve bladder control. Train your bladder:  Go to the bathroom at set times, such as every 2 hours, even if you do not feel the urge to go.  You can also try to hold your urine when you feel the urge to go. For example, hold your urine for 5 minutes when you feel the urge to go. As that becomes easier, hold your urine for 10 minutes. Self-care:   Keep a UI record. Write down how often you leak urine and how much you leak. Make a note of what you were doing when you leaked urine. Drink liquids as directed. You may need to limit the amount of liquid you drink to help control your urine leakage. Do not drink any liquid right before you go to bed. Limit or do not have drinks that contain caffeine or alcohol. Prevent constipation. Eat a variety of high-fiber foods. Good examples are high-fiber cereals, beans, vegetables, and whole-grain breads. Walking is the best way to trigger your intestines to have a bowel movement. Exercise regularly and maintain a healthy weight. Weight loss and exercise will decrease pressure on your bladder and help you control your leakage. Use a catheter as directed  to help empty your bladder. A catheter is a tiny, plastic tube that is put into your bladder to drain your urine. Go to behavior therapy as directed. Behavior therapy may be used to help you learn to control your urge to urinate. Weight Management   Why it is important to manage your weight:  Being overweight increases your risk of health conditions such as heart disease, high blood pressure, type 2 diabetes, and certain types of cancer. It can also increase your risk for osteoarthritis, sleep apnea, and other respiratory problems. Aim for a slow, steady weight loss. Even a small amount of weight loss can lower your risk of health problems. How to lose weight safely:  A safe and healthy way to lose weight is to eat fewer calories and get regular exercise. You can lose up about 1 pound a week by decreasing the number of calories you eat by 500 calories each day.    Healthy meal plan for weight management:  A healthy meal plan includes a variety of foods, contains fewer calories, and helps you stay healthy. A healthy meal plan includes the following:  Eat whole-grain foods more often. A healthy meal plan should contain fiber. Fiber is the part of grains, fruits, and vegetables that is not broken down by your body. Whole-grain foods are healthy and provide extra fiber in your diet. Some examples of whole-grain foods are whole-wheat breads and pastas, oatmeal, brown rice, and bulgur. Eat a variety of vegetables every day. Include dark, leafy greens such as spinach, kale, bharti greens, and mustard greens. Eat yellow and orange vegetables such as carrots, sweet potatoes, and winter squash. Eat a variety of fruits every day. Choose fresh or canned fruit (canned in its own juice or light syrup) instead of juice. Fruit juice has very little or no fiber. Eat low-fat dairy foods. Drink fat-free (skim) milk or 1% milk. Eat fat-free yogurt and low-fat cottage cheese. Try low-fat cheeses such as mozzarella and other reduced-fat cheeses. Choose meat and other protein foods that are low in fat. Choose beans or other legumes such as split peas or lentils. Choose fish, skinless poultry (chicken or turkey), or lean cuts of red meat (beef or pork). Before you cook meat or poultry, cut off any visible fat. Use less fat and oil. Try baking foods instead of frying them. Add less fat, such as margarine, sour cream, regular salad dressing and mayonnaise to foods. Eat fewer high-fat foods. Some examples of high-fat foods include french fries, doughnuts, ice cream, and cakes. Eat fewer sweets. Limit foods and drinks that are high in sugar. This includes candy, cookies, regular soda, and sweetened drinks. Exercise:  Exercise at least 30 minutes per day on most days of the week. Some examples of exercise include walking, biking, dancing, and swimming. You can also fit in more physical activity by taking the stairs instead of the elevator or parking farther away from stores.  Ask your healthcare provider about the best exercise plan for you. © Copyright [employer] 2018 Information is for End User's use only and may not be sold, redistributed or otherwise used for commercial purposes.  All illustrations and images included in CareNotes® are the copyrighted property of [employer] or [address] [place] [address]

## 2023-11-15 NOTE — PROGRESS NOTES
Assessment and Plan:     Problem List Items Addressed This Visit       COPD (chronic obstructive pulmonary disease) ([address])    Hypokalemia    Relevant Orders    Comprehensive metabolic panel (Completed)    HLD (hyperlipidemia) - Primary     Other Visit Diagnoses       Thrombocytosis        Relevant Orders    CBC and differential (Completed)    Bilateral arm pain        Relevant Orders    Sedimentation rate, automated (Completed)    C-reactive protein (Completed)    Antinuclear Antibodies (EMELYN), IFA (Completed)    CK (Completed)    Acute pain of both shoulders        Relevant Orders    XR humerus right    Sedimentation rate, automated (Completed)    C-reactive protein (Completed)    Antinuclear Antibodies (EMELYN), IFA (Completed)    CK (Completed)    Decreased strength of upper extremity        Relevant Orders    XR humerus right    Sedimentation rate, automated (Completed)    C-reactive protein (Completed)    Antinuclear Antibodies (EMELYN), IFA (Completed)    CK (Completed)    Decreased range of motion of shoulder, unspecified laterality        Relevant Orders    XR humerus right    Sedimentation rate, automated (Completed)    C-reactive protein (Completed)    Antinuclear Antibodies (EMELYN), IFA (Completed)    CK (Completed)    Lytic bone lesions on xray        Relevant Orders    NM bone scan whole body    Sedimentation rate, automated (Completed)    C-reactive protein (Completed)    Antinuclear Antibodies (EMELYN), IFA (Completed)    Protein electrophoresis, serum (Completed)    Kappa/Lambda Light Chains Free With Ratio, Serum    CK (Completed)    Mixed stress and urge urinary incontinence        Need for hepatitis C screening test        Relevant Orders    Hepatitis C antibody (Completed)             Preventive health issues were discussed with patient, and age appropriate screening tests were ordered as noted in patient's After Visit Summary.   Personalized health advice and appropriate referrals for health education or preventive services given if needed, as noted in patient's After Visit Summary. History of Present Illness:     Patient presents for a Medicare Wellness Visit. See other note for current concerns and active problems discussed in addition to medicare wellness. HPI   Patient Care Team:  Clare Davis DO as PCP - General (Family Medicine)  Niki Nyhan, MD Nonah Bair Doctor, MD     Review of Systems:     Review of Systems  See other note     Problem List:     Patient Active Problem List   Diagnosis    DJD (degenerative joint disease) of cervical spine    Arthritis    COPD (chronic obstructive pulmonary disease) (HCC)    PAD (peripheral artery disease) ([address])    Popliteal artery injury    Gastroesophageal reflux disease    Irritable bowel syndrome with diarrhea    Hx of adenomatous colonic polyps    Age-related macular degeneration    Benign essential hypertension    Cataracts, both eyes    Lichen sclerosus et atrophicus    Nephrolithiasis    Hypokalemia    Difficulty in walking, not elsewhere classified    Dysphagia    Extrapyramidal disease    Family history of Moore syndrome    Fibromyalgia    Generalized muscle weakness    HLD (hyperlipidemia)    Knee pain    Low back pain    Major depressive disorder, single episode, unspecified    Pseudoaneurysm of femoral artery (HCC)    Unilateral primary osteoarthritis, right knee    Status post bariatric surgery    Sleep apnea    Pseudoaneurysm ([address])    Obesity with body mass index 30 or greater    Mixed anxiety depressive disorder      Past Medical and Surgical History:     Past Medical History:   Diagnosis Date    Abnormal ECG 2010 ?     Arthritis     Asthma     Chronic pain disorder     Colon polyp     COPD (chronic obstructive pulmonary disease) (HCC)     Coronary artery disease     Fibromyalgia, primary     GERD (gastroesophageal reflux disease)     High blood pressure     History of transfusion     Hyperlipidemia     Hypertension     Inflammatory bowel disease 2002    Irritable bowel syndrome     Kidney stone     Myocardial infarction [facility]) ? Silent heart attack    Pneumonia 2023    PONV (postoperative nausea and vomiting)     Urinary tract infection Past 4 years    Frequent    Varicella     Visual impairment 2023    Cataracts, dry, macular degeneration     Past Surgical History:   Procedure Laterality Date    BARIATRIC SURGERY      COLONOSCOPY      GASTROSTOMY      HYSTERECTOMY Bilateral     complete  age 54    IR IVC FILTER REMOVAL  12/07/2015    IR LOWER EXTREMITY ANGIOGRAM  08/15/2022    JOINT REPLACEMENT      bilateral knee    LAPAROSCOPIC GASTRIC BANDING      POPLITEAL ARTERY STENT      2016    [place] SLCTV CATHJ 3RD+ ORD SLCTV ABDL PEL/LXTR [facility] Right 08/15/2022    Procedure:  Aortogram RLE runoff L CFA access w/ 6F sheath and mynx closure R popliteal PTA w/ 4x60mm  balloon;  Surgeon: Christine Shin MD;  Location: [facility];  Service: Vascular    REPLACEMENT TOTAL KNEE      2016    TONSILLECTOMY      US GUIDED VASCULAR ACCESS  12/07/2015      Family History:     Family History   Problem Relation Age of Onset    Ovarian cancer Mother     Cancer Mother     No Known Problems Father     Colon cancer Cousin     Cancer Cousin         colon    Cancer Maternal Grandmother     Coronary artery disease Maternal Grandfather     COPD Maternal Grandfather     Osteoarthritis Paternal Grandmother     Stroke Paternal Grandmother     Arthritis Paternal Grandmother     Heart attack Paternal Grandfather     Hypertension Paternal Grandfather     Heart disease Paternal Grandfather     No Known Problems Son     No Known Problems Son     No Known Problems Maternal Aunt     Vision loss Paternal Aunt       Social History:     Social History     Socioeconomic History    Marital status: Legally      Spouse name: None    Number of children: None    Years of education: None    Highest education level: None   Occupational History    None   Tobacco Use    Smoking status: Never    Smokeless tobacco: Never   Vaping Use    Vaping Use: Never used   Substance and Sexual Activity    Alcohol use: Never    Drug use: Never    Sexual activity: Not Currently     Partners: Male     Birth control/protection: Post-menopausal   Other Topics Concern    None   Social History Narrative    None     Social Determinants of Health     Financial Resource Strain: Low Risk  (11/9/2023)    Overall Financial Resource Strain (CARDIA)     Difficulty of Paying Living Expenses: Not hard at all   Food Insecurity: Not on file   Transportation Needs: No Transportation Needs (11/9/2023)    PRAPARE - Transportation     Lack of Transportation (Medical): No     Lack of Transportation (Non-Medical):  No   Physical Activity: Not on file   Stress: Not on file   Social Connections: Not on file   Intimate Partner Violence: Not on file   Housing Stability: Not on file      Medications and Allergies:     Current Outpatient Medications   Medication Sig Dispense Refill    ALPRAZolam (XANAX) 0.25 mg tablet       amoxicillin (AMOXIL) 500 mg capsule TAKE 4 CAPSULES BY MOUTH 1 HOUR PRIOR TO DENTAL WORK      aspirin (ECOTRIN LOW STRENGTH) 81 mg EC tablet Take 81 mg by mouth daily      Aspirin-Acetaminophen-Caffeine (EXCEDRIN PO) Take by mouth daily      Azelastine HCl 137 MCG/SPRAY SOLN 1 SPRAY INTO EACH NOSTRIL 2 (TWO) TIMES A DAY USE IN EACH NOSTRIL AS DIRECTED 30 mL 1    clobetasol (TEMOVATE) 0.05 % cream Apply topically 2 (two) times a day 45 g 2    diazepam (VALIUM) 2 mg tablet Take 2 mg by mouth daily at bedtime      DULoxetine (CYMBALTA) 20 mg capsule Take 20 mg by mouth every other day      esomeprazole (NexIUM) 40 MG capsule TAKE 1 CAPSULE BY MOUTH IN THE  MORNING TAKE 1/2 HOUR PRIOR TO  BREAKFAST 90 capsule 3    valsartan (DIOVAN) 40 mg tablet TAKE 1 TABLET BY MOUTH EVERY DAY (Patient taking differently: Take 40 mg by mouth daily 1/2 daily) 90 tablet 1    cholestyramine (QUESTRAN) 4 g packet Take 1 packet (4 g total) by mouth daily at bedtime 90 packet 3    tiotropium (Spiriva HandiHaler) 18 mcg inhalation capsule Place 18 mcg into inhaler and inhale       No current facility-administered medications for this visit. Allergies   Allergen Reactions    Erythromycin Nausea Only     Nausea      Levofloxacin     Codeine Other (See Comments)     RINGING IN EARS AND DIFFICULTY SLEEPING    Trospium Other (See Comments)     Dry mouth      Immunizations:     Immunization History   Administered Date(s) Administered    COVID-19 MODERNA VACC 0.5 ML IM 01/27/2021, 02/24/2021, 11/03/2021    INFLUENZA 09/22/2017, 10/01/2018, 10/06/2020, 10/10/2023    Influenza, seasonal, injectable, preservative free 10/01/2018    Pneumococcal Conjugate 13-Valent 01/24/2020    Pneumococcal Conjugate PCV 7 09/22/2017    Pneumococcal Polysaccharide PPV23 06/01/2017    Td (adult), Unspecified 06/01/2014    Tuberculin Skin Test 11/10/2015    Tuberculin Skin Test-PPD Intradermal 11/10/2015    Zoster Vaccine Recombinant 01/08/2020      Health Maintenance:         Topic Date Due    Breast Cancer Screening: Mammogram  11/15/2029 (Originally 9/20/2022)    Colorectal Cancer Screening  10/06/2024    Hepatitis C Screening  Completed         Topic Date Due    COVID-19 Vaccine (4 - Malott Sake series) 12/29/2021      Medicare Screening Tests and Risk Assessments:     Sharri Parker is here for her Subsequent Wellness visit. Health Risk Assessment:   Patient rates overall health as very good. Patient feels that their physical health rating is same. Patient is very satisfied with their life. Eyesight was rated as slightly worse. Hearing was rated as same. Patient feels that their emotional and mental health rating is same. Patients states they are never, rarely angry. Patient states they are sometimes unusually tired/fatigued. Pain experienced in the last 7 days has been a lot. Patient's pain rating has been 9/10.  Patient states that she has experienced no weight loss or gain in last 6 months. I have been experiencing pain in both upper arms. Fall Risk Screening: In the past year, patient has experienced: history of falling in past year    Number of falls: 1  Injured during fall?: Yes    Feels unsteady when standing or walking?: No    Worried about falling?: No      Urinary Incontinence Screening:   Patient has leaked urine accidently in the last six months. Home Safety:  Patient has trouble with stairs inside or outside of their home. Patient has working smoke alarms and has working carbon monoxide detector. Home safety hazards include: none. Nutrition:   Current diet is Regular. Medications:   Patient is currently taking over-the-counter supplements. OTC medications include: see medication list. Patient is able to manage medications. Activities of Daily Living (ADLs)/Instrumental Activities of Daily Living (IADLs):   Walk and transfer into and out of bed and chair?: Yes  Dress and groom yourself?: Yes    Bathe or shower yourself?: Yes    Feed yourself? Yes  Do your laundry/housekeeping?: Yes  Manage your money, pay your bills and track your expenses?: Yes  Make your own meals?: Yes    Do your own shopping?: Yes    Previous Hospitalizations:   Any hospitalizations or ED visits within the last 12 months?: Yes    How many hospitalizations have you had in the last year?: 1-2    Advance Care Planning:   Living will: No    Durable POA for healthcare:  Yes    Advanced directive: Yes    Advanced directive counseling given: Yes    ACP document given: Yes      PREVENTIVE SCREENINGS      Cardiovascular Screening:    General: Screening Not Indicated and History Lipid Disorder      Diabetes Screening:     General: Screening Current      Colorectal Cancer Screening:     General: Screening Current      Cervical Cancer Screening:    General: Screening Not Indicated      Osteoporosis Screening:    General: Patient Declines      Abdominal Aortic Aneurysm (AAA) Screening:        General: Screening Not Indicated      Lung Cancer Screening:     General: Screening Not Indicated      Hepatitis C Screening:    General: Risks and Benefits Discussed    Screening, Brief Intervention, and Referral to Treatment (SBIRT)    Screening  Typical number of drinks in a day: 0  Typical number of drinks in a week: 0  Interpretation: Low risk drinking behavior. AUDIT-C Screenin) How often did you have a drink containing alcohol in the past year? never  2) How many drinks did you have on a typical day when you were drinking in the past year? 0  3) How often did you have 6 or more drinks on one occasion in the past year? never    AUDIT-C Score: 0  Interpretation: Score 0-2 (female): Negative screen for alcohol misuse    Single Item Drug Screening:  How often have you used an illegal drug (including marijuana) or a prescription medication for non-medical reasons in the past year? never    Single Item Drug Screen Score: 0  Interpretation: Negative screen for possible drug use disorder    Medicare questionnaire was reviewed personally and confirmed answers. Screenings, falls, and end of life care was discussed at length. No results found.      Physical Exam:     /70   Pulse 70   Ht 5' 1" (1.549 m)   Wt 82.1 kg (181 lb)   SpO2 97%   BMI 34.20 kg/m²     Physical Exam   No PE performed    Jenny Lynch DO

## 2023-11-16 LAB
ANA TITR SER IF: NEGATIVE {TITER}
KAPPA LC FREE SER-MCNC: 19.4 MG/L (ref 3.3–19.4)
KAPPA LC FREE/LAMBDA FREE SER: 1.42 {RATIO} (ref 0.26–1.65)
LAMBDA LC FREE SERPL-MCNC: 13.7 MG/L (ref 5.7–26.3)

## 2023-11-17 LAB
ALBUMIN SERPL ELPH-MCNC: 4.01 G/DL (ref 3.2–5.1)
ALBUMIN SERPL ELPH-MCNC: 59.8 % (ref 48–70)
ALPHA1 GLOB SERPL ELPH-MCNC: 0.28 G/DL (ref 0.15–0.47)
ALPHA1 GLOB SERPL ELPH-MCNC: 4.2 % (ref 1.8–7)
ALPHA2 GLOB SERPL ELPH-MCNC: 0.79 G/DL (ref 0.42–1.04)
ALPHA2 GLOB SERPL ELPH-MCNC: 11.8 % (ref 5.9–14.9)
BETA GLOB ABNORMAL SERPL ELPH-MCNC: 0.48 G/DL (ref 0.31–0.57)
BETA1 GLOB SERPL ELPH-MCNC: 7.1 % (ref 4.7–7.7)
BETA2 GLOB SERPL ELPH-MCNC: 7.2 % (ref 3.1–7.9)
BETA2+GAMMA GLOB SERPL ELPH-MCNC: 0.48 G/DL (ref 0.2–0.58)
GAMMA GLOB ABNORMAL SERPL ELPH-MCNC: 0.66 G/DL (ref 0.4–1.66)
GAMMA GLOB SERPL ELPH-MCNC: 9.9 % (ref 6.9–22.3)
IGG/ALB SER: 1.49 {RATIO} (ref 1.1–1.8)
PROT PATTERN SERPL ELPH-IMP: NORMAL
PROT SERPL-MCNC: 6.7 G/DL (ref 6.4–8.2)

## 2023-11-17 PROCEDURE — 84165 PROTEIN E-PHORESIS SERUM: CPT | Performed by: STUDENT IN AN ORGANIZED HEALTH CARE EDUCATION/TRAINING PROGRAM

## 2023-11-26 ENCOUNTER — TELEPHONE (OUTPATIENT)
Dept: FAMILY MEDICINE CLINIC | Facility: CLINIC | Age: 73
End: 2023-11-26

## 2023-11-26 DIAGNOSIS — K58.0 IRRITABLE BOWEL SYNDROME WITH DIARRHEA: ICD-10-CM

## 2023-11-26 DIAGNOSIS — R35.0 URINARY FREQUENCY: ICD-10-CM

## 2023-11-26 DIAGNOSIS — K21.9 GASTROESOPHAGEAL REFLUX DISEASE, UNSPECIFIED WHETHER ESOPHAGITIS PRESENT: Primary | ICD-10-CM

## 2023-11-26 DIAGNOSIS — R13.10 DYSPHAGIA, UNSPECIFIED TYPE: ICD-10-CM

## 2023-11-26 DIAGNOSIS — R30.0 BURNING WITH URINATION: ICD-10-CM

## 2023-11-26 RX ORDER — PANTOPRAZOLE SODIUM 40 MG/1
40 TABLET, DELAYED RELEASE ORAL 2 TIMES DAILY
Qty: 60 TABLET | Refills: 2 | Status: SHIPPED | OUTPATIENT
Start: 2023-11-26

## 2023-11-26 NOTE — TELEPHONE ENCOUNTER
Called patient and reviewed labs. Overall mild increase to calcium and mild increase to Crp. Otherwise normal labs. Will wait to see what imaging also turns up since she will be going for specific testing for bone lesions. Will also get Xrs performed. We reviewed her GERD as well. Still having breakthrough will change from nexium 40mg daily to protonix BID. Lastly will have UA ready for her to give sample. Possibly passed stone recently with significant pain. No current symptoms will obtain it if necessary.      Neymar Burton DO  [facility]  11/26/2023 12:40 PM

## 2023-12-06 ENCOUNTER — APPOINTMENT (OUTPATIENT)
Dept: LAB | Facility: CLINIC | Age: 73
End: 2023-12-06

## 2023-12-06 DIAGNOSIS — R30.0 BURNING WITH URINATION: ICD-10-CM

## 2023-12-06 DIAGNOSIS — R35.0 URINARY FREQUENCY: ICD-10-CM

## 2023-12-07 ENCOUNTER — APPOINTMENT (OUTPATIENT)
Dept: LAB | Facility: CLINIC | Age: 73
End: 2023-12-07
Payer: COMMERCIAL

## 2023-12-07 ENCOUNTER — HOSPITAL ENCOUNTER (OUTPATIENT)
Dept: NUCLEAR MEDICINE | Facility: HOSPITAL | Age: 73
Discharge: HOME | End: 2023-12-07
Attending: FAMILY MEDICINE
Payer: COMMERCIAL

## 2023-12-07 ENCOUNTER — TELEPHONE (OUTPATIENT)
Dept: FAMILY MEDICINE CLINIC | Facility: CLINIC | Age: 73
End: 2023-12-07

## 2023-12-07 ENCOUNTER — HOSPITAL ENCOUNTER (OUTPATIENT)
Dept: RADIOLOGY | Facility: HOSPITAL | Age: 73
Discharge: HOME | End: 2023-12-07
Attending: FAMILY MEDICINE
Payer: COMMERCIAL

## 2023-12-07 DIAGNOSIS — M89.9 LYTIC BONE LESIONS ON XRAY: ICD-10-CM
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
## 2023-12-07 DIAGNOSIS — R29.898 DECREASED STRENGTH OF UPPER EXTREMITY: ICD-10-CM

## 2023-12-07 DIAGNOSIS — M25.511 ACUTE PAIN OF BOTH SHOULDERS: ICD-10-CM

## 2023-12-07 DIAGNOSIS — M25.512 ACUTE PAIN OF BOTH SHOULDERS: ICD-10-CM

## 2023-12-07 DIAGNOSIS — N39.0 URINARY TRACT INFECTION WITHOUT HEMATURIA, SITE UNSPECIFIED: ICD-10-CM

## 2023-12-07 DIAGNOSIS — M25.619 DECREASED RANGE OF MOTION OF SHOULDER, UNSPECIFIED LATERALITY: ICD-10-CM

## 2023-12-07 PROCEDURE — 72040 X-RAY EXAM NECK SPINE 2-3 VW: CPT

## 2023-12-07 PROCEDURE — G1004 CDSM NDSC: HCPCS

## 2023-12-07 PROCEDURE — A9503 TC99M MEDRONATE: HCPCS

## 2023-12-07 PROCEDURE — 73030 X-RAY EXAM OF SHOULDER: CPT

## 2023-12-07 PROCEDURE — 73060 X-RAY EXAM OF HUMERUS: CPT

## 2023-12-07 PROCEDURE — 78306 BONE IMAGING WHOLE BODY: CPT

## 2023-12-07 NOTE — TELEPHONE ENCOUNTER
----- Message from Erna Justice DO sent at 12/7/2023  4:14 PM EST -----  Please call the patient and inform her that the bone scan was negative. Please and thank you. There is evidence of degenerative changes/ arthritis but no concerning lesions in the bone.       Sincerely,     Dr. Dez Ac DO

## 2023-12-10 ENCOUNTER — TELEPHONE (OUTPATIENT)
Dept: FAMILY MEDICINE CLINIC | Facility: CLINIC | Age: 73
End: 2023-12-10

## 2023-12-10 DIAGNOSIS — N30.01 ACUTE CYSTITIS WITH HEMATURIA: Primary | ICD-10-CM

## 2023-12-10 DIAGNOSIS — R35.0 URINARY FREQUENCY: ICD-10-CM

## 2023-12-10 DIAGNOSIS — M79.2 NEUROPATHIC PAIN: ICD-10-CM

## 2023-12-10 DIAGNOSIS — N39.0 FREQUENT UTI: ICD-10-CM

## 2023-12-10 DIAGNOSIS — R52 BODY ACHES: ICD-10-CM

## 2023-12-10 RX ORDER — CEPHALEXIN 500 MG/1
500 CAPSULE ORAL EVERY 12 HOURS SCHEDULED
Qty: 10 CAPSULE | Refills: 0 | Status: SHIPPED | OUTPATIENT
Start: 2023-12-10 | End: 2023-12-15

## 2023-12-10 RX ORDER — PREGABALIN 50 MG/1
CAPSULE ORAL
Qty: 60 CAPSULE | Refills: 0 | Status: SHIPPED | OUTPATIENT
Start: 2023-12-10 | End: 2024-01-13

## 2023-12-10 NOTE — TELEPHONE ENCOUNTER
Called patient to review multiple labs and imaging. Will send over medication for positive UA. Additionally we reviewed the patient's concern about continued chronic pain and aches in joints. She has attempted Cymbalta in the past, but is unable to go up to higher doses. Due to the medication Lyrica, did recommend that she try and reduce the frequency of taking Cymbalta and primarily use Lyrica since it can be used for both fibro and neuropathic pain. She is to watch her symptoms closely and call with any new or worsening side effects.

## 2023-12-21 ENCOUNTER — TELEPHONE (OUTPATIENT)
Age: 73
End: 2023-12-21

## 2023-12-21 NOTE — TELEPHONE ENCOUNTER
Veronika from [employer] [facility]ose office called to confirm Keflex and Lyrica was prescribed by us. Confirmed.

## 2023-12-29 ENCOUNTER — APPOINTMENT (OUTPATIENT)
Dept: LAB | Facility: CLINIC | Age: 73
End: 2023-12-29
Payer: COMMERCIAL

## 2023-12-29 ENCOUNTER — TRANSCRIBE ORDERS (OUTPATIENT)
Dept: LAB | Facility: CLINIC | Age: 73
End: 2023-12-29

## 2023-12-29 DIAGNOSIS — E83.52 HYPERCALCEMIA: ICD-10-CM

## 2023-12-29 DIAGNOSIS — E83.52 HYPERCALCEMIA: Primary | ICD-10-CM

## 2023-12-29 LAB
25(OH)D3 SERPL-MCNC: 23 NG/ML (ref 30–100)
CALCIUM SERPL-MCNC: 9.4 MG/DL (ref 8.4–10.2)
PTH-INTACT SERPL-MCNC: 88 PG/ML (ref 12–88)

## 2023-12-29 PROCEDURE — 82306 VITAMIN D 25 HYDROXY: CPT

## 2023-12-29 PROCEDURE — 82310 ASSAY OF CALCIUM: CPT

## 2023-12-29 PROCEDURE — 83970 ASSAY OF PARATHORMONE: CPT

## 2023-12-29 PROCEDURE — 36415 COLL VENOUS BLD VENIPUNCTURE: CPT

## 2024-01-11 ENCOUNTER — TELEPHONE (OUTPATIENT)
Dept: FAMILY MEDICINE CLINIC | Facility: CLINIC | Age: 74
End: 2024-01-11

## 2024-01-11 DIAGNOSIS — E55.9 VITAMIN D DEFICIENCY: Primary | ICD-10-CM

## 2024-01-11 DIAGNOSIS — R79.89 LOW SERUM VITAMIN D: ICD-10-CM

## 2024-01-11 RX ORDER — ERGOCALCIFEROL 1.25 MG/1
50000 CAPSULE ORAL WEEKLY
Qty: 12 CAPSULE | Refills: 1 | Status: SHIPPED | OUTPATIENT
Start: 2024-01-11

## 2024-01-11 NOTE — TELEPHONE ENCOUNTER
----- Message from Alin Marx DO sent at 1/11/2024  1:04 PM EST -----  Please inform the patient and the last of her labs came through.  She has low vitamin D.  I have sent over a weekly capsule to take to boost vitamin D levels.     Alin Marx DO  [facility]  1/11/2024 1:04 PM

## 2024-02-01 DIAGNOSIS — K21.9 GASTROESOPHAGEAL REFLUX DISEASE, UNSPECIFIED WHETHER ESOPHAGITIS PRESENT: ICD-10-CM

## 2024-02-01 DIAGNOSIS — K58.0 IRRITABLE BOWEL SYNDROME WITH DIARRHEA: ICD-10-CM

## 2024-02-01 DIAGNOSIS — R13.10 DYSPHAGIA, UNSPECIFIED TYPE: ICD-10-CM

## 2024-02-01 RX ORDER — PANTOPRAZOLE SODIUM 40 MG/1
40 TABLET, DELAYED RELEASE ORAL 2 TIMES DAILY
Qty: 180 TABLET | Refills: 1 | Status: SHIPPED | OUTPATIENT
Start: 2024-02-01

## 2024-02-28 DIAGNOSIS — K58.0 IRRITABLE BOWEL SYNDROME WITH DIARRHEA: ICD-10-CM

## 2024-02-28 RX ORDER — CHOLESTYRAMINE 4 G/9G
1 POWDER, FOR SUSPENSION ORAL
Qty: 90 PACKET | Refills: 0 | Status: SHIPPED | OUTPATIENT
Start: 2024-02-28 | End: 2024-05-28

## 2024-02-28 NOTE — TELEPHONE ENCOUNTER
Reason for call:   [x] Refill   [] Prior Auth  [] Other:     Office:   [] PCP/Provider -   [x] Specialty/Provider - Gastro Johnny     Medication: Questran     Dose/Frequency: 4g packet - daily     Quantity: 90    Pharmacy: [facility] home delivery     Does the patient have enough for 3 days?   [x] Yes   [] No - Send as HP to POD

## 2024-03-08 ENCOUNTER — TELEPHONE (OUTPATIENT)
Dept: GASTROENTEROLOGY | Facility: CLINIC | Age: 74
End: 2024-03-08

## 2024-03-08 NOTE — TELEPHONE ENCOUNTER
I called patient and left message to c/b to schedule routine f/u appt. Please schedule when patient calls back. Thank you

## 2024-03-28 ENCOUNTER — TELEPHONE (OUTPATIENT)
Age: 74
End: 2024-03-28

## 2024-03-28 DIAGNOSIS — K58.0 IRRITABLE BOWEL SYNDROME WITH DIARRHEA: ICD-10-CM

## 2024-03-28 DIAGNOSIS — R13.10 DYSPHAGIA, UNSPECIFIED TYPE: ICD-10-CM

## 2024-03-28 DIAGNOSIS — K21.9 GASTROESOPHAGEAL REFLUX DISEASE, UNSPECIFIED WHETHER ESOPHAGITIS PRESENT: Primary | ICD-10-CM

## 2024-03-28 RX ORDER — ESOMEPRAZOLE MAGNESIUM 40 MG/1
40 CAPSULE, DELAYED RELEASE ORAL DAILY
Qty: 90 CAPSULE | Refills: 1 | Status: SHIPPED | OUTPATIENT
Start: 2024-03-28

## 2024-03-28 NOTE — TELEPHONE ENCOUNTER
Patient called in post trial of pantoprazole and reports she prefers to take Nexium 40 mg.Patient reports she feels better while on Nexium. Please send to [facility] home delivery. Please follow up with patient for provider response; alright to leave message if patient not available.

## 2024-03-28 NOTE — TELEPHONE ENCOUNTER
I have reviewed the patient's request for a change to Nexium.  I will place it, but cannot promise that it will be covered by her insurance.

## 2024-04-01 ENCOUNTER — EVALUATION (OUTPATIENT)
Dept: PHYSICAL THERAPY | Facility: CLINIC | Age: 74
End: 2024-04-01
Payer: COMMERCIAL

## 2024-04-01 DIAGNOSIS — R26.89 BALANCE PROBLEM: ICD-10-CM

## 2024-04-01 DIAGNOSIS — R29.898 WEAKNESS OF BOTH LOWER EXTREMITIES: ICD-10-CM

## 2024-04-01 DIAGNOSIS — G89.29 CHRONIC BILATERAL LOW BACK PAIN WITHOUT SCIATICA: Primary | ICD-10-CM

## 2024-04-01 DIAGNOSIS — M54.50 CHRONIC BILATERAL LOW BACK PAIN WITHOUT SCIATICA: Primary | ICD-10-CM

## 2024-04-01 PROCEDURE — 97162 PT EVAL MOD COMPLEX 30 MIN: CPT

## 2024-04-01 NOTE — PROGRESS NOTES
PT Evaluation     Today's date: 2024  Patient name: Enedina Howard  : 1950  MRN: [id number]  Referring provider: Evita uDnlap*  Dx:   Encounter Diagnosis     ICD-10-CM    1. Chronic bilateral low back pain without sciatica  M54.50     G89.29       2. Weakness of both lower extremities  R29.898       3. Balance problem  R26.89           Start Time: 1315  Stop Time: 1400  Total time in clinic (min): 45 minutes    Assessment  Assessment details: Enedina Howard is a pleasant 73 y.o. female who presents with low back pain and LE weakness.  The patient's greatest concerns are worry over not knowing what's wrong, concern at no signs of improvement, fear of not being able to keep active, and future ill health (and wanting to prevent it).      No further referral appears necessary at this time based upon examination results.    Primary movement impairment diagnosis of decreased core and LE strength resulting in pathoanatomical symptoms of decreased balance and limiting her ability to care for self, carry, don/doff socks, exercise or recreation, get off the toilet, get out of a chair, lift, perform household chores, perform yard work, shop, sit, sleep, squat to  objects from the floor, stand, and walk.    Primary Impairments:  1) decreased core and LE strength  2) impaired balance    Etiologic factors include poor lower extremity strength.    Impairments: abnormal coordination, abnormal gait, abnormal muscle firing, abnormal muscle tone, abnormal or restricted ROM, activity intolerance, impaired balance, impaired physical strength, lacks appropriate home exercise program, pain with function, safety issue, poor posture  and poor body mechanics    Symptom irritability: moderateUnderstanding of Dx/Px/POC: good   Prognosis: good  Prognosis details: Positive prognostic indicators include positive attitude toward recovery and good understanding of diagnosis and treatment plan options.  Negative  prognostic indicators include chronicity of symptoms, high symptom irritability, and multiple concurrent orthopedic problems.      Goals  Impairment Goals 4-6 weeks  - Decrease pain to <2/10  - Improve lumbar AROM to >80% throughout  - Increase hip strength to 4+/5 throughout  - Increase core strength to be able to sit straight up without deviation    Functional Goals 6-8 weeks  - Return to Prior Level of Function  - Patient will be independent with HEP  - Patient will be able to sit without increased pain/compensation/difficulty  - Patient will be able to perform sit to stand without increased pain/compensation/difficulty   - Patient will be able to bend forward without increased pain/compensation/difficulty   - Patient will be able to lift >20 pounds with proper mechanics and no pain      Plan  Patient would benefit from: skilled physical therapy  Planned modality interventions: Modalities PRN.  Planned therapy interventions: activity modification, manual therapy, neuromuscular re-education, patient education, therapeutic activities, therapeutic exercise, graded activity, home exercise program, behavior modification and self care  Frequency: 2x week  Duration in weeks: 8  Treatment plan discussed with: patient        Subjective Evaluation    History of Present Illness  Mechanism of injury: HOME LIFE: live alone, 1 flight (19) steps,   HOBBIES/EXERCISE: reading, watching documentaries/movies, meet with friends,   PLOF:  R knee replacement about 5 years ago, scoliosis,   HISTORY OF CURRENT INJURY:  Pt reports she has been getting stiffer since COVID since she had stayed home for the most part.  Low back pain began years ago and has slowly increased.  She reports decreased core and LE strength, as well as improve her balance.  PAIN LOCATION/DESCRIPTORS: low back, B LE  AGGRAVATING FACTORS:  stepping into bath tub, vacuum, sweeping, cooking, sleeping, balance activities,   DAY PATTERN: after activities,   IMAGING:  bone scan, x-ray (shoulders),    Enedina denies a new onset of Bladder incontinence, Bowel dysfunction, Dizziness, Dysphagia, Dysarthria, Drop attacks, Diplopia, Nausea, Ataxia, Recent unexplained weight loss, Clumsy or unsteadiness, Constant night pain, History of cancer, Tingling, Numbness, and Saddle anesthesia .  PATIENT GOALS: feel safe while walking and standing,     Patient Goals  Patient goals for therapy: increased motion, improved balance, return to sport/leisure activities, independence with ADLs/IADLs, increased strength, decreased pain and decreased edema    Pain  Current pain ratin  At best pain ratin  At worst pain rating: 10  Quality: burning, cramping, knife-like, tight and sharp  Relieving factors: ice, relaxation, heat and rest  Aggravating factors: walking, standing, stair climbing, running and lifting          Objective     Postural Observations  Seated posture: poor  Standing posture: fair      Palpation   Left   Tenderness of the erector spinae, lumbar paraspinals and quadratus lumborum.     Right   Tenderness of the erector spinae, lumbar paraspinals and quadratus lumborum.     Neurological Testing     Additional Neurological Details  Reported mild change in sensation of B plantar aspect of feet    Active Range of Motion     Additional Active Range of Motion Details  LS AROM:   Flexion: 75%  Extension: 25%  SideBending right: 50%  SideBending left: 50%  Rotation right: 75%  Rotation left: 75%    Strength/Myotome Testing     Left Hip   Planes of Motion   Flexion: 3+  Extension: 3+  Abduction: 3+  External rotation: 3+  Internal rotation: 3+    Right Hip   Planes of Motion   Flexion: 3+  Extension: 3+  Abduction: 3+  External rotation: 3+  Internal rotation: 3+    Left Knee   Flexion: 4-  Extension: 4-    Right Knee   Flexion: 4-  Extension: 4-    Left Ankle/Foot   Dorsiflexion: 4    Right Ankle/Foot   Dorsiflexion: 4    Additional Strength Details  Cramping occurred with strength  testing for the lE    Tests     Additional Tests Details  TUG: 15 sec, unsteady distance back, no AD, no HHA during sit to stand  30x sit to stand: 9 no HHA              POC Expires Auth Status Start Date Expiration Date PT Visit Limit    5/1 No auth   BOMN   Date 4/1       Used 1       Remaining          Diagnosis: Low back pain   Precautions:    Primary Goals:    *asterisks by exercise = given for HEP   Manuals 4/1                                               There Ex        LTR        SKTC        DLTC        YB roll out                                        Bike        Neuro Re-Ed        Glute squeeze        Quad sets        SLR        Clamshells        Bridge        Multifidi activation        Pelvic tilts         Seated marches        Standing marches                                 Re-evaluation              Ther Act              Education                           Modalities

## 2024-04-03 ENCOUNTER — OFFICE VISIT (OUTPATIENT)
Dept: PHYSICAL THERAPY | Facility: CLINIC | Age: 74
End: 2024-04-03
Payer: COMMERCIAL

## 2024-04-03 DIAGNOSIS — M54.50 CHRONIC BILATERAL LOW BACK PAIN WITHOUT SCIATICA: Primary | ICD-10-CM

## 2024-04-03 DIAGNOSIS — R26.89 BALANCE PROBLEM: ICD-10-CM

## 2024-04-03 DIAGNOSIS — G89.29 CHRONIC BILATERAL LOW BACK PAIN WITHOUT SCIATICA: Primary | ICD-10-CM

## 2024-04-03 DIAGNOSIS — R29.898 WEAKNESS OF BOTH LOWER EXTREMITIES: ICD-10-CM

## 2024-04-03 PROCEDURE — 97112 NEUROMUSCULAR REEDUCATION: CPT

## 2024-04-03 PROCEDURE — 97110 THERAPEUTIC EXERCISES: CPT | Performed by: PHYSICAL THERAPIST

## 2024-04-03 NOTE — PROGRESS NOTES
"Daily Note     Today's date: 4/3/2024  Patient name: Enedina Howard  : 1950  MRN: [id number]  Referring provider: Evita Dunlap*  Dx:   Encounter Diagnosis     ICD-10-CM    1. Chronic bilateral low back pain without sciatica  M54.50     G89.29       2. Weakness of both lower extremities  R29.898       3. Balance problem  R26.89           Start Time: 1130  Stop Time: 1215  Total time in clinic (min): 45 minutes    Subjective: Pt reports that she is feeling pretty good coming into treatment today.        Objective: See treatment diary below      Assessment: Tolerated treatment well. Patient would benefit from continued PT      Plan: Continue per plan of care.       POC Expires Auth Status Start Date Expiration Date PT Visit Limit     No auth   BOMN   Date 4/1 4/3      Used 1 2      Remaining          Diagnosis: Low back pain   Precautions:    Primary Goals:    *asterisks by exercise = given for HEP   Manuals 4/1 4/3                                              There Ex        LTR        SKTC        DLTC        YB roll out  x10      HS stretch  10x10\"                              Bike  5'      Neuro Re-Ed        Glute squeeze        Quad sets        SLR        Clamshells  1x10 B RTB      Bridge        Multifidi activation        Pelvic tilts         Seated marches  x20      Standing marches        Heel raises  x10      Toe raises  x10               Re-evaluation              Ther Act              Education                           Modalities                                                                                          "

## 2024-04-10 ENCOUNTER — OFFICE VISIT (OUTPATIENT)
Dept: PHYSICAL THERAPY | Facility: CLINIC | Age: 74
End: 2024-04-10
Payer: COMMERCIAL

## 2024-04-10 DIAGNOSIS — R26.89 BALANCE PROBLEM: ICD-10-CM

## 2024-04-10 DIAGNOSIS — R29.898 WEAKNESS OF BOTH LOWER EXTREMITIES: ICD-10-CM

## 2024-04-10 DIAGNOSIS — G89.29 CHRONIC BILATERAL LOW BACK PAIN WITHOUT SCIATICA: Primary | ICD-10-CM

## 2024-04-10 DIAGNOSIS — M54.50 CHRONIC BILATERAL LOW BACK PAIN WITHOUT SCIATICA: Primary | ICD-10-CM

## 2024-04-10 PROCEDURE — 97112 NEUROMUSCULAR REEDUCATION: CPT

## 2024-04-10 PROCEDURE — 97110 THERAPEUTIC EXERCISES: CPT

## 2024-04-10 NOTE — PROGRESS NOTES
"Daily Note     Today's date: 4/10/2024  Patient name: Enedina Howard  : 1950  MRN: [id number]  Referring provider: Evita Dunlap*  Dx:   Encounter Diagnosis     ICD-10-CM    1. Chronic bilateral low back pain without sciatica  M54.50     G89.29       2. Weakness of both lower extremities  R29.898       3. Balance problem  R26.89           Start Time: 1100  Stop Time: 1145  Total time in clinic (min): 45 minutes    Subjective: Pt reports that she felt good after last treatment session, though she is having a fibromyalgia flare up today.      Objective: See treatment diary below      Assessment: Tolerated treatment well. Treatment began with warm up on bike with good tolerance.  Moved onto supine activities to strengthen as well as stretch.  Added calf stretch as well as LTR today with good comfortable stretch noted.  Pt tolerated strengthening activities well, having some challenge with bridges.  Patient would benefit from continued PT      Plan: Continue per plan of care.       POC Expires Auth Status Start Date Expiration Date PT Visit Limit     No auth   BOMN   Date 4/1 4/3 4/10     Used 1 2 3     Remaining          Diagnosis: Low back pain   Precautions:    Primary Goals:    *asterisks by exercise = given for HEP   Manuals 4/1 4/3 4/10                                             There Ex        LTR   x10     SKTC        DLTC        YB roll out  x10      HS stretch  10x10\"      Calf stretch   10x10\"                     Bike  5' 5'     Neuro Re-Ed        Glute squeeze   2x10x5\"     Quad sets   2x10x5\"     SLR   1x10     Clamshells  1x10 B RTB 1x10 B RTB     Bridge   1x10     Multifidi activation        Pelvic tilts         Seated marches  x20 X20 supine     Standing marches        Heel raises  x10      Toe raises  x10      Adduction   2x10x5\"                      Re-evaluation              Ther Act              Education                           Modalities                                     "

## 2024-04-11 ENCOUNTER — OFFICE VISIT (OUTPATIENT)
Dept: PHYSICAL THERAPY | Facility: CLINIC | Age: 74
End: 2024-04-11
Payer: COMMERCIAL

## 2024-04-11 ENCOUNTER — TELEPHONE (OUTPATIENT)
Dept: GASTROENTEROLOGY | Facility: CLINIC | Age: 74
End: 2024-04-11

## 2024-04-11 DIAGNOSIS — M54.50 CHRONIC BILATERAL LOW BACK PAIN WITHOUT SCIATICA: Primary | ICD-10-CM

## 2024-04-11 DIAGNOSIS — R26.89 BALANCE PROBLEM: ICD-10-CM

## 2024-04-11 DIAGNOSIS — R29.898 WEAKNESS OF BOTH LOWER EXTREMITIES: ICD-10-CM

## 2024-04-11 DIAGNOSIS — G89.29 CHRONIC BILATERAL LOW BACK PAIN WITHOUT SCIATICA: Primary | ICD-10-CM

## 2024-04-11 PROCEDURE — 97110 THERAPEUTIC EXERCISES: CPT

## 2024-04-11 PROCEDURE — 97112 NEUROMUSCULAR REEDUCATION: CPT

## 2024-04-11 NOTE — TELEPHONE ENCOUNTER
Pt is due for a follow up with Jazmin Turner, I called the patient to get her on the schedule, but pt expressed she is driving. I advised pt I will call back another time.

## 2024-04-11 NOTE — PROGRESS NOTES
"Daily Note     Today's date: 2024  Patient name: Enedina Howard  : 1950  MRN: [id number]  Referring provider: Evita Dunlap*  Dx:   Encounter Diagnosis     ICD-10-CM    1. Chronic bilateral low back pain without sciatica  M54.50     G89.29       2. Weakness of both lower extremities  R29.898       3. Balance problem  R26.89           Start Time: 0915  Stop Time: 1000  Total time in clinic (min): 45 minutes    Subjective: Pt reports that her body is feeling bad today because of her fibromyalgia.  She reports that her knees felt good after last treatment session though.        Objective: See treatment diary below      Assessment: Tolerated treatment well. Began today with warm up on bike to improve LE muscular endurance.  Moved onto strengthening in seated today.  Began with stretching out for low back using yoga ball.  Hip strengthening followed.  Pt was able to tolerate second set for many of her exercises, though required seated rest breaks throughout.   Patient would benefit from continued PT      Plan: Continue per plan of care.       POC Expires Auth Status Start Date Expiration Date PT Visit Limit     No auth   BOMN   Date 4/1 4/3 4/10 4/11    Used 1 2 3 4    Remaining          Diagnosis: Low back pain   Precautions:    Primary Goals:    *asterisks by exercise = given for HEP   Manuals 4/1 4/3 4/10 4/11                                            There Ex        LTR   x10     SKTC        DLTC        YB roll out  x10  X10 forward, lat    HS stretch  10x10\"      Calf stretch   10x10\"                     Bike  5' 5' 5'    Neuro Re-Ed        Glute squeeze   2x10x5\"     Quad sets   2x10x5\"     SLR   1x10 1x10 B seated    Clamshells  1x10 B RTB 1x10 B RTB 2x10 B RTB    Bridge   1x10     Multifidi activation        Pelvic tilts         Seated marches  x20 X20 supine x20    Standing marches        Heel raises  x10  1x10x5\"    Toe raises  x10  1x10x5\"    Adduction   2x10x5\"                   "    Re-evaluation              Ther Act              Education                           Modalities

## 2024-04-12 DIAGNOSIS — K58.0 IRRITABLE BOWEL SYNDROME WITH DIARRHEA: ICD-10-CM

## 2024-04-12 RX ORDER — CHOLESTYRAMINE 4 G/9G
POWDER, FOR SUSPENSION ORAL
Qty: 60 EACH | Refills: 5 | Status: SHIPPED | OUTPATIENT
Start: 2024-04-12

## 2024-04-15 ENCOUNTER — OFFICE VISIT (OUTPATIENT)
Dept: PHYSICAL THERAPY | Facility: CLINIC | Age: 74
End: 2024-04-15
Payer: COMMERCIAL

## 2024-04-15 DIAGNOSIS — R29.898 WEAKNESS OF BOTH LOWER EXTREMITIES: ICD-10-CM

## 2024-04-15 DIAGNOSIS — M54.50 CHRONIC BILATERAL LOW BACK PAIN WITHOUT SCIATICA: Primary | ICD-10-CM

## 2024-04-15 DIAGNOSIS — R26.89 BALANCE PROBLEM: ICD-10-CM

## 2024-04-15 DIAGNOSIS — G89.29 CHRONIC BILATERAL LOW BACK PAIN WITHOUT SCIATICA: Primary | ICD-10-CM

## 2024-04-15 PROCEDURE — 97112 NEUROMUSCULAR REEDUCATION: CPT

## 2024-04-15 PROCEDURE — 97110 THERAPEUTIC EXERCISES: CPT

## 2024-04-15 NOTE — PROGRESS NOTES
"Daily Note     Today's date: 4/15/2024  Patient name: Enedina Howard  : 1950  MRN: [id number]  Referring provider: Evita Dunlap*  Dx:   Encounter Diagnosis     ICD-10-CM    1. Chronic bilateral low back pain without sciatica  M54.50     G89.29       2. Weakness of both lower extremities  R29.898       3. Balance problem  R26.89           Start Time: 1315  Stop Time: 1400  Total time in clinic (min): 45 minutes    Subjective: Pt reports that she felt good after last treatment session, though kept going during the day and became very fatigued.        Objective: See treatment diary below      Assessment: Tolerated treatment well. Began with warm up on bike to improve LE muscular endurance.  Continued onto stretching for the low back.  Continued with combination of LE and core strengthening activities.  Pt required seated rest breaks throughout.  Educated pt on importance of taking rest breaks as needed throughout the day to ensure safety with walking and daily activities.  Patient would benefit from continued PT      Plan: Continue per plan of care.       POC Expires Auth Status Start Date Expiration Date PT Visit Limit     No auth   BOMN   Date 4/1 4/3 4/10 4/11 4/15   Used 1 2 3 4 5   Remaining          Diagnosis: Low back pain   Precautions:    Primary Goals:    *asterisks by exercise = given for HEP   Manuals  4/3 4/10 4/11 4/15                                           There Ex        LTR   x10     SKTC        DLTC        YB roll out  x10  X10 forward, lat X20 forward, lat   HS stretch  10x10\"      Calf stretch   10x10\"                     Bike  5' 5' 5' 5'   Neuro Re-Ed        Glute squeeze   2x10x5\"     Quad sets   2x10x5\"     SLR   1x10 1x10 B seated    Clamshells  1x10 B RTB 1x10 B RTB 2x10 B RTB    Bridge   1x10     Multifidi activation     X20 ball press   Pelvic tilts         Seated marches  x20 X20 supine x20    Standing marches        Heel raises  x10  1x10x5\"    Toe raises  x10  " "1x10x5\"    Adduction   2x10x5\"  2x10x5\"   Wobble board     X20 ea   HS curls     2x10x5\" foam roller   Pallof press     2x10 red ball            Re-evaluation             Ther Act             Education                         Modalities                                                                                             "

## 2024-04-17 ENCOUNTER — OFFICE VISIT (OUTPATIENT)
Dept: PHYSICAL THERAPY | Facility: CLINIC | Age: 74
End: 2024-04-17
Payer: COMMERCIAL

## 2024-04-17 DIAGNOSIS — G89.29 CHRONIC BILATERAL LOW BACK PAIN WITHOUT SCIATICA: Primary | ICD-10-CM

## 2024-04-17 DIAGNOSIS — R26.89 BALANCE PROBLEM: ICD-10-CM

## 2024-04-17 DIAGNOSIS — R29.898 WEAKNESS OF BOTH LOWER EXTREMITIES: ICD-10-CM

## 2024-04-17 DIAGNOSIS — M54.50 CHRONIC BILATERAL LOW BACK PAIN WITHOUT SCIATICA: Primary | ICD-10-CM

## 2024-04-17 PROCEDURE — 97110 THERAPEUTIC EXERCISES: CPT

## 2024-04-17 PROCEDURE — 97112 NEUROMUSCULAR REEDUCATION: CPT

## 2024-04-17 NOTE — PROGRESS NOTES
"Daily Note     Today's date: 2024  Patient name: Enedina Howard  : 1950  MRN: [id number]  Referring provider: Evita Dunlap*  Dx:   Encounter Diagnosis     ICD-10-CM    1. Chronic bilateral low back pain without sciatica  M54.50     G89.29       2. Weakness of both lower extremities  R29.898       3. Balance problem  R26.89           Start Time: 1145  Stop Time: 1230  Total time in clinic (min): 45 minutes    Subjective: Pt reports that her low back continues with some discomfort, but she felt good after last treatment session.        Objective: See treatment diary below      Assessment: Tolerated treatment well. Began with stretching out on yoga ball for low back stretching.  Added bike to improve LE muscular endurance with good tolerance.  Continued with core and LE strengthening.  Pt had a challenge during seated SLR today, though was tyree to continue into second set. Patient would benefit from continued PT      Plan: Continue per plan of care.       POC Expires Auth Status Start Date Expiration Date PT Visit Limit     No auth   BOMN   Date 4/17 4/3 4/10 4/11 4/15   Used 6 2 3 4 5   Remaining          Diagnosis: Low back pain   Precautions:    Primary Goals:    *asterisks by exercise = given for HEP   Manuals 4/17 4/3 4/10 4/11 4/15                                           There Ex        LTR   x10     SKTC        DLTC        YB roll out X20 forward, lat x10  X10 forward, lat X20 forward, lat   HS stretch  10x10\"      Calf stretch   10x10\"                     Bike 5' 5' 5' 5' 5'   Neuro Re-Ed        Glute squeeze   2x10x5\"     Quad sets   2x10x5\"     SLR 2x10 seated  1x10 1x10 B seated    Clamshells  1x10 B RTB 1x10 B RTB 2x10 B RTB    Bridge   1x10     Multifidi activation X20 ball press    X20 ball press   Pelvic tilts         Seated marches 20x5\" B x20 X20 supine x20    Standing marches        Heel raises 20x5\" x10  1x10x5\"    Toe raises 20x5\" x10  1x10x5\"    Adduction   2x10x5\"  " "2x10x5\"   Wobble board     X20 ea   HS curls     2x10x5\" foam roller   Pallof press 2x10 red ball    2x10 red ball            Re-evaluation             Ther Act             Education                         Modalities                                                                                               "

## 2024-04-22 ENCOUNTER — OFFICE VISIT (OUTPATIENT)
Dept: PHYSICAL THERAPY | Facility: CLINIC | Age: 74
End: 2024-04-22
Payer: COMMERCIAL

## 2024-04-22 DIAGNOSIS — G89.29 CHRONIC BILATERAL LOW BACK PAIN WITHOUT SCIATICA: Primary | ICD-10-CM

## 2024-04-22 DIAGNOSIS — R26.89 BALANCE PROBLEM: ICD-10-CM

## 2024-04-22 DIAGNOSIS — R29.898 WEAKNESS OF BOTH LOWER EXTREMITIES: ICD-10-CM

## 2024-04-22 DIAGNOSIS — M54.50 CHRONIC BILATERAL LOW BACK PAIN WITHOUT SCIATICA: Primary | ICD-10-CM

## 2024-04-22 PROCEDURE — 97530 THERAPEUTIC ACTIVITIES: CPT

## 2024-04-22 PROCEDURE — 97112 NEUROMUSCULAR REEDUCATION: CPT

## 2024-04-22 PROCEDURE — 97110 THERAPEUTIC EXERCISES: CPT

## 2024-04-22 NOTE — PROGRESS NOTES
"Daily Note     Today's date: 2024  Patient name: Enedina Howard  : 1950  MRN: [id number]  Referring provider: Evita Dunlap*  Dx:   Encounter Diagnosis     ICD-10-CM    1. Chronic bilateral low back pain without sciatica  M54.50     G89.29       2. Weakness of both lower extremities  R29.898       3. Balance problem  R26.89           Start Time: 1345  Stop Time: 1430  Total time in clinic (min): 45 minutes    Subjective: Pt reports that she feels as though she is \"limbering up\".        Objective: See treatment diary below      Assessment: Tolerated treatment well. Began today with warm up on bike.  Followed up with yoga ball roll out to work on stretching low back.  Continued strengthening activities.  Educated pt on importance of glute activation with low back pain.  Pt presented with muscular fatigue by end of treatment session today.  Patient would benefit from continued PT      Plan: Continue per plan of care.       POC Expires Auth Status Start Date Expiration Date PT Visit Limit     No auth   BOMN   Date 4/17 4/22  4/11 4/15   Used 6 7  4 5   Remaining          Diagnosis: Low back pain   Precautions:    Primary Goals:    *asterisks by exercise = given for HEP   Manuals 4/17 4/22  4/11 4/15                                           There Ex        LTR        SKTC        DLTC        YB roll out X20 forward, lat X20 forward, lat  X10 forward, lat X20 forward, lat   HS stretch        Calf stretch                        Bike 5' 5'  5' 5'   Neuro Re-Ed        Glute squeeze        Quad sets        SLR 2x10 seated 2x10  1x10 B seated    Clamshells    2x10 B RTB    Bridge        Multifidi activation X20 ball press    X20 ball press   Pelvic tilts         Seated marches 20x5\" B   x20    Standing marches        Heel raises 20x5\" 20x5\"  1x10x5\"    Toe raises 20x5\" 20x5\"  1x10x5\"    Adduction  20x5\"   2x10x5\"   Wobble board     X20 ea   HS curls     2x10x5\" foam roller   Pallof press 2x10 red " ball    2x10 red ball   Seated glute press  2x10 B                       Re-evaluation           Ther Act           Education                     Modalities

## 2024-04-23 ENCOUNTER — OFFICE VISIT (OUTPATIENT)
Dept: PHYSICAL THERAPY | Facility: CLINIC | Age: 74
End: 2024-04-23
Payer: COMMERCIAL

## 2024-04-23 DIAGNOSIS — R26.89 BALANCE PROBLEM: ICD-10-CM

## 2024-04-23 DIAGNOSIS — G89.29 CHRONIC BILATERAL LOW BACK PAIN WITHOUT SCIATICA: Primary | ICD-10-CM

## 2024-04-23 DIAGNOSIS — I10 BENIGN ESSENTIAL HYPERTENSION: ICD-10-CM

## 2024-04-23 DIAGNOSIS — R29.898 WEAKNESS OF BOTH LOWER EXTREMITIES: ICD-10-CM

## 2024-04-23 DIAGNOSIS — M54.50 CHRONIC BILATERAL LOW BACK PAIN WITHOUT SCIATICA: Primary | ICD-10-CM

## 2024-04-23 PROCEDURE — 97110 THERAPEUTIC EXERCISES: CPT

## 2024-04-23 PROCEDURE — 97112 NEUROMUSCULAR REEDUCATION: CPT

## 2024-04-23 RX ORDER — VALSARTAN 40 MG/1
40 TABLET ORAL DAILY
Qty: 90 TABLET | Refills: 1 | Status: SHIPPED | OUTPATIENT
Start: 2024-04-23

## 2024-04-23 NOTE — PROGRESS NOTES
"Daily Note     Today's date: 2024  Patient name: Enedina Howard  : 1950  MRN: [id number]  Referring provider: Evita Dunlap*  Dx:   Encounter Diagnosis     ICD-10-CM    1. Chronic bilateral low back pain without sciatica  M54.50     G89.29       2. Weakness of both lower extremities  R29.898       3. Balance problem  R26.89           Start Time: 1045  Stop Time: 1130  Total time in clinic (min): 45 minutes    Subjective: Pt reports that she feels like she is doing much better than the day before.       Objective: See treatment diary below      Assessment: Tolerated treatment well. Began today with warm up on bike with good tolerance.  Continued onto stretching for low back with pt  having decreased tightness afterward.  Continued onto LE strengthening as well as adding some postural strengthening.  Decreased discomfort in low back noted after scap squeezes today. Patient would benefit from continued PT      Plan: Continue per plan of care.       POC Expires Auth Status Start Date Expiration Date PT Visit Limit     No auth   BOMN   Date 4/17 4/22 4/23 4/11 4/15   Used 6 7 8 4 5   Remaining          Diagnosis: Low back pain   Precautions:    Primary Goals:    *asterisks by exercise = given for HEP   Manuals 4/17 4/22 4/23 4/11 4/15                                           There Ex        LTR        SKTC        DLTC        YB roll out X20 forward, lat X20 forward, lat X20 forward, lat X10 forward, lat X20 forward, lat   HS stretch   10x10\"     Calf stretch   10x10\"                     Bike 5' 5' 5' 5' 5'   Neuro Re-Ed        Glute squeeze        Quad sets        SLR 2x10 seated 2x10  1x10 B seated    Clamshells    2x10 B RTB    Bridge        Multifidi activation X20 ball press    X20 ball press   Pelvic tilts         Seated marches 20x5\" B   x20    Standing marches        Heel raises 20x5\" 20x5\"  1x10x5\"    Toe raises 20x5\" 20x5\"  1x10x5\"    Adduction  20x5\"   2x10x5\"   Wobble board     X20 " "ea   HS curls   2x10x5\" foam roller  2x10x5\" foam roller   Pallof press 2x10 red ball  2x10 red ball  2x10 red ball   Seated glute press  2x10 B      Scap squeeze   2x10x5\"              Re-evaluation           Ther Act           Education                     Modalities                                                                                             "

## 2024-04-29 ENCOUNTER — EVALUATION (OUTPATIENT)
Dept: PHYSICAL THERAPY | Facility: CLINIC | Age: 74
End: 2024-04-29
Payer: COMMERCIAL

## 2024-04-29 DIAGNOSIS — R26.89 BALANCE PROBLEM: ICD-10-CM

## 2024-04-29 DIAGNOSIS — R29.898 WEAKNESS OF BOTH LOWER EXTREMITIES: ICD-10-CM

## 2024-04-29 DIAGNOSIS — G89.29 CHRONIC BILATERAL LOW BACK PAIN WITHOUT SCIATICA: Primary | ICD-10-CM

## 2024-04-29 DIAGNOSIS — M54.50 CHRONIC BILATERAL LOW BACK PAIN WITHOUT SCIATICA: Primary | ICD-10-CM

## 2024-04-29 PROCEDURE — 97112 NEUROMUSCULAR REEDUCATION: CPT

## 2024-04-29 PROCEDURE — 97110 THERAPEUTIC EXERCISES: CPT

## 2024-04-29 NOTE — PROGRESS NOTES
PT Evaluation     Today's date: 2024  Patient name: Enedina Howard  : 1950  MRN: [id number]  Referring provider: Evita Dunlap*  Dx:   Encounter Diagnosis     ICD-10-CM    1. Chronic bilateral low back pain without sciatica  M54.50     G89.29       2. Weakness of both lower extremities  R29.898       3. Balance problem  R26.89             Start Time: 1230  Stop Time: 1315  Total time in clinic (min): 45 minutes    Assessment  Assessment details: Enedina Howard has been compliant with attending PT and home exercise program since initial eval.  Enedina  has made improvements in objective data since initial eval but continues to have limitations compared to prior level of function. She presents with improvements in low back pain as well as her abilities.  She has been able to stand for longer periods of time.  She also has been able to walk further and feels as though she has improved in her balance.  She continues with some hip weakness as well as core weakness and would benefit from continued strengthening.  Enedina continues to have deficits in the above listed impairments and would benefit from additional skilled PT to address these deficits to return to prior level of function.      Impairments: abnormal coordination, abnormal gait, abnormal muscle firing, abnormal muscle tone, abnormal or restricted ROM, activity intolerance, impaired balance, impaired physical strength, lacks appropriate home exercise program, pain with function, safety issue, poor posture  and poor body mechanics    Symptom irritability: moderateUnderstanding of Dx/Px/POC: good   Prognosis: good  Prognosis details: Positive prognostic indicators include positive attitude toward recovery and good understanding of diagnosis and treatment plan options.  Negative prognostic indicators include chronicity of symptoms, high symptom irritability, and multiple concurrent orthopedic problems.      Goals  Impairment Goals 4-6  weeks  - Decrease pain to <2/10 - progressing   - Improve lumbar AROM to >80% throughout- progressing   - Increase hip strength to 4+/5 throughout- progressing   - Increase core strength to be able to sit straight up without deviation- progressing     Functional Goals 6-8 weeks  - Return to Prior Level of Function- progressing   - Patient will be independent with HEP- progressing   - Patient will be able to sit without increased pain/compensation/difficulty- progressing   - Patient will be able to perform sit to stand without increased pain/compensation/difficulty - progressing   - Patient will be able to bend forward without increased pain/compensation/difficulty - progressing   - Patient will be able to lift >20 pounds with proper mechanics and no pain- progressing       Plan  Patient would benefit from: skilled physical therapy  Planned modality interventions: Modalities PRN.  Planned therapy interventions: activity modification, manual therapy, neuromuscular re-education, patient education, therapeutic activities, therapeutic exercise, graded activity, home exercise program, behavior modification and self care  Frequency: 2x week  Duration in weeks: 4  Treatment plan discussed with: patient        Subjective Evaluation    History of Present Illness  Mechanism of injury: Re-eval 4/29:  Pt reports that she feels as though she is about 50% better as compared to when she first began.  She reports that she feels as though she is able to sleep a lot better and having less pain.  She reports that she is also negotiating steps better.  She reports that she has more confidence in her balance.  She feels as though she would benefit from increased strength in the LE as well as continuing with balance.  She reports that she would like to continue working on her back as well, reporting needing some strengthening as well.        HOME LIFE: live alone, 1 flight (19) steps,   HOBBIES/EXERCISE: reading, watching  "documentaries/movies, meet with friends,   PLOF:  R knee replacement about 5 years ago, scoliosis,   HISTORY OF CURRENT INJURY:  Pt reports she has been getting stiffer since COVID since she had stayed home for the most part.  Low back pain began years ago and has slowly increased.  She reports decreased core and LE strength, as well as improve her balance.  PAIN LOCATION/DESCRIPTORS: low back, B LE  AGGRAVATING FACTORS:  stepping into bath tub, vacuum, sweeping, cooking, sleeping, balance activities,   DAY PATTERN: after activities,   IMAGING: bone scan, x-ray (shoulders),    Enedina denies a new onset of Bladder incontinence, Bowel dysfunction, Dizziness, Dysphagia, Dysarthria, Drop attacks, Diplopia, Nausea, Ataxia, Recent unexplained weight loss, Clumsy or unsteadiness, Constant night pain, History of cancer, Tingling, Numbness, and Saddle anesthesia .  PATIENT GOALS: feel safe while walking and standing,     Patient Goals  Patient goals for therapy: increased motion, improved balance, return to sport/leisure activities, independence with ADLs/IADLs, increased strength, decreased pain and decreased edema    Pain  Current pain ratin  At best pain ratin  At worst pain ratin  Quality: burning, cramping and tight  Relieving factors: relaxation and rest  Aggravating factors: walking, standing, stair climbing, running and lifting          Objective     Postural Observations  Seated posture: poor  Standing posture: fair      Palpation   Left   Tenderness of the erector spinae, lumbar paraspinals and quadratus lumborum.     Right   Tenderness of the erector spinae, lumbar paraspinals and quadratus lumborum.     Neurological Testing     Additional Neurological Details  Pt reports return of feeling and decreased feeling \"thickness\" intermittently    Active Range of Motion     Additional Active Range of Motion Details  LS AROM:   Flexion: 75%  Extension: 30%  SideBending right: 50%  SideBending left: " "50%  Rotation right: 75%  Rotation left: 75%    Strength/Myotome Testing     Left Hip   Planes of Motion   Flexion: 4-  Extension: 4  Abduction: 4-  External rotation: 4-  Internal rotation: 4-    Right Hip   Planes of Motion   Flexion: 4-  Extension: 4  Abduction: 4-  External rotation: 4-  Internal rotation: 4-    Left Knee   Flexion: 4+  Extension: 4+    Right Knee   Flexion: 4+  Extension: 4+    Left Ankle/Foot   Dorsiflexion: 4    Right Ankle/Foot   Dorsiflexion: 4    Additional Strength Details  Cramping occurred with strength testing for the lE    Tests     Additional Tests Details  TUG: 10 sec, unsteady distance back, no AD, no HHA during sit to stand  30x sit to stand: 10 no HHA              POC Expires Auth Status Start Date Expiration Date PT Visit Limit    5/1 No auth   BOMN   Date 4/17 4/22 4/23 4/29- Re eval    Used 6 7 8 9    Remaining          Diagnosis: Low back pain   Precautions:    Primary Goals:    *asterisks by exercise = given for HEP   Manuals 4/17 4/22 4/23 4/29                                            There Ex        LTR        SKTC        DLTC        YB roll out X20 forward, lat X20 forward, lat X20 forward, lat X20 forward, lat    HS stretch   10x10\"     Calf stretch   10x10\"                     Bike 5' 5' 5' 5'    Neuro Re-Ed        Glute squeeze        Quad sets        SLR 2x10 seated 2x10      Clamshells        Bridge        Multifidi activation X20 ball press       Pelvic tilts         Seated marches 20x5\" B       Standing marches        Heel raises 20x5\" 20x5\"      Toe raises 20x5\" 20x5\"      Adduction  20x5\"      Wobble board        HS curls   2x10x5\" foam roller     Pallof press 2x10 red ball  2x10 red ball     Seated glute press  2x10 B      Scap squeeze   2x10x5\"              Re-evaluation    AK     Ther Act         Education                 Modalities                                                                           "

## 2024-05-02 ENCOUNTER — APPOINTMENT (OUTPATIENT)
Dept: PHYSICAL THERAPY | Facility: CLINIC | Age: 74
End: 2024-05-02
Payer: COMMERCIAL

## 2024-05-06 ENCOUNTER — OFFICE VISIT (OUTPATIENT)
Dept: PHYSICAL THERAPY | Facility: CLINIC | Age: 74
End: 2024-05-06
Payer: COMMERCIAL

## 2024-05-06 DIAGNOSIS — R29.898 WEAKNESS OF BOTH LOWER EXTREMITIES: ICD-10-CM

## 2024-05-06 DIAGNOSIS — G89.29 CHRONIC BILATERAL LOW BACK PAIN WITHOUT SCIATICA: Primary | ICD-10-CM

## 2024-05-06 DIAGNOSIS — M54.50 CHRONIC BILATERAL LOW BACK PAIN WITHOUT SCIATICA: Primary | ICD-10-CM

## 2024-05-06 DIAGNOSIS — R26.89 BALANCE PROBLEM: ICD-10-CM

## 2024-05-06 PROCEDURE — 97110 THERAPEUTIC EXERCISES: CPT

## 2024-05-06 PROCEDURE — 97112 NEUROMUSCULAR REEDUCATION: CPT

## 2024-05-06 NOTE — PROGRESS NOTES
"Daily Note     Today's date: 2024  Patient name: Enedina Howard  : 1950  MRN: [id number]  Referring provider: Evita Dunlap*  Dx:   Encounter Diagnosis     ICD-10-CM    1. Chronic bilateral low back pain without sciatica  M54.50     G89.29       2. Weakness of both lower extremities  R29.898       3. Balance problem  R26.89           Start Time: 1345  Stop Time: 1430  Total time in clinic (min): 45 minutes    Subjective: Pt reports that she is very fatigued coming into treatment today       Objective: See treatment diary below      Assessment: Tolerated treatment well. Began with warm up on bike followed by stretching for the low back. Pt had increased tightness today so moved into supine for more stretching.  Added strengthening of core and low back as well as hips today.  Pt had decreased tightness after treatment today.   Patient demonstrated fatigue post treatment      Plan: Continue per plan of care.       POC Expires Auth Status Start Date Expiration Date PT Visit Limit     No auth   BOMN   Date - Re eval    Used 6 7 8 9 10   Remaining          Diagnosis: Low back pain   Precautions:    Primary Goals:    *asterisks by exercise = given for HEP   Manuals                                            There Ex        LTR     x20   SKTC        DLTC        YB roll out X20 forward, lat X20 forward, lat X20 forward, lat X20 forward, lat X20 forward, lat   HS stretch   10x10\"     Calf stretch   10x10\"                     Bike 5' 5' 5' 5' 5'   Neuro Re-Ed        Glute squeeze        Quad sets        SLR 2x10 seated 2x10      Clamshells        Bridge     1x10   Multifidi activation X20 ball press    10x10\"   Pelvic tilts      1x10   Seated marches 20x5\" B       Standing marches        Heel raises 20x5\" 20x5\"      Toe raises 20x5\" 20x5\"      Adduction  20x5\"   20x5\"   Wobble board        HS curls   2x10x5\" foam roller     Pallof press 2x10 red ball  2x10 " "red ball     Seated glute press  2x10 B      Scap squeeze   2x10x5\"                              Re-evaluation    AK     Ther Act         Education                 Modalities                                                                           "

## 2024-05-08 ENCOUNTER — OFFICE VISIT (OUTPATIENT)
Dept: PHYSICAL THERAPY | Facility: CLINIC | Age: 74
End: 2024-05-08
Payer: COMMERCIAL

## 2024-05-08 DIAGNOSIS — M54.50 CHRONIC BILATERAL LOW BACK PAIN WITHOUT SCIATICA: Primary | ICD-10-CM

## 2024-05-08 DIAGNOSIS — R29.898 WEAKNESS OF BOTH LOWER EXTREMITIES: ICD-10-CM

## 2024-05-08 DIAGNOSIS — R26.89 BALANCE PROBLEM: ICD-10-CM

## 2024-05-08 DIAGNOSIS — G89.29 CHRONIC BILATERAL LOW BACK PAIN WITHOUT SCIATICA: Primary | ICD-10-CM

## 2024-05-08 PROCEDURE — 97110 THERAPEUTIC EXERCISES: CPT

## 2024-05-08 PROCEDURE — 97112 NEUROMUSCULAR REEDUCATION: CPT

## 2024-05-08 NOTE — PROGRESS NOTES
"Daily Note     Today's date: 2024  Patient name: Enedina Howard  : 1950  MRN: [id number]  Referring provider: Evita Dunlap*  Dx:   Encounter Diagnosis     ICD-10-CM    1. Chronic bilateral low back pain without sciatica  M54.50     G89.29       2. Balance problem  R26.89       3. Weakness of both lower extremities  R29.898                      Subjective: Pt reports that she felt very sore after PT session and today she feels a bit \"off\"      Objective: See treatment diary below      Assessment: Tolerated treatment well. Exercises modified in seated today due to being sore. She had good tolerance to outlined program with cueing for core engagement to this date.   Patient demonstrated fatigue post treatment      Plan: Continue per plan of care.       POC Expires Auth Status Start Date Expiration Date PT Visit Limit     No auth   BOMN   Date - Re eval    Used 11 7 8 9 10   Remaining          Diagnosis: Low back pain   Precautions:    Primary Goals:    *asterisks by exercise = given for HEP   Manuals                                            There Ex        LTR     x20   SKTC        DLTC        YB roll out X20 forward, lat X20 forward, lat X20 forward, lat X20 forward, lat X20 forward, lat   HS stretch   10x10\"     Calf stretch   10x10\"                     Bike 5' 5' 5' 5' 5'   Neuro Re-Ed        Glute squeeze        Quad sets LAQ 2x10 1.5#       SLR  2x10      Clamshells Seated RTB 2x10       Bridge     1x10   Multifidi activation X25 ball press    10x10\"   Pelvic tilts      1x10   Seated marches 2x20       Standing marches        Heel raises  20x5\"      Toe raises  20x5\"      Adduction Seated adductor squeeze 20x5\" 20x5\"   20x5\"   Wobble board        HS curls   2x10x5\" foam roller     Pallof press   2x10 red ball     Seated glute press  2x10 B      Scap squeeze   2x10x5\"                              Re-evaluation    AK     Ther Act         " Education                 Modalities

## 2024-05-09 ENCOUNTER — RA CDI HCC (OUTPATIENT)
Dept: OTHER | Facility: HOSPITAL | Age: 74
End: 2024-05-09

## 2024-05-13 ENCOUNTER — OFFICE VISIT (OUTPATIENT)
Dept: PHYSICAL THERAPY | Facility: CLINIC | Age: 74
End: 2024-05-13
Payer: COMMERCIAL

## 2024-05-13 DIAGNOSIS — R29.898 WEAKNESS OF BOTH LOWER EXTREMITIES: ICD-10-CM

## 2024-05-13 DIAGNOSIS — M54.50 CHRONIC BILATERAL LOW BACK PAIN WITHOUT SCIATICA: Primary | ICD-10-CM

## 2024-05-13 DIAGNOSIS — G89.29 CHRONIC BILATERAL LOW BACK PAIN WITHOUT SCIATICA: Primary | ICD-10-CM

## 2024-05-13 DIAGNOSIS — R26.89 BALANCE PROBLEM: ICD-10-CM

## 2024-05-13 PROCEDURE — 97112 NEUROMUSCULAR REEDUCATION: CPT

## 2024-05-13 PROCEDURE — 97110 THERAPEUTIC EXERCISES: CPT

## 2024-05-13 NOTE — PROGRESS NOTES
"Daily Note     Today's date: 2024  Patient name: Enedina Howard  : 1950  MRN: [id number]  Referring provider: Evita Dunlap*  Dx:   Encounter Diagnosis     ICD-10-CM    1. Chronic bilateral low back pain without sciatica  M54.50     G89.29       2. Balance problem  R26.89       3. Weakness of both lower extremities  R29.898           Start Time: 1400  Stop Time: 1445  Total time in clinic (min): 45 minutes    Subjective: Pt reports that she is a bit fatigued today.        Objective: See treatment diary below      Assessment: Tolerated treatment well. Began today with stretching for low back with good tolerance and some stiffness noted.  Continued onto bike to improve LE muscular endurance with good tolerance noted.  Pt was able to perform more standing activities today.  Added step ups to 4 inch step with more challenge noted on L LE as compared to R. She continues with challenge during LAQ and other quad activities, though presenting with improvements.  Patient demonstrated fatigue post treatment, exhibited good technique with therapeutic exercises, and would benefit from continued PT      Plan: Continue per plan of care.       POC Expires Auth Status Start Date Expiration Date PT Visit Limit     No auth   BOMN   Date - Re eval    Used 11 7 8 9 10   Remaining          Diagnosis: Low back pain   Precautions:    Primary Goals:    *asterisks by exercise = given for HEP   Manuals                                            There Ex        LTR     x20   SKTC        DLTC        YB roll out X20 forward, lat X20 forward, lat  X20 forward, lat X20 forward, lat   HS stretch        Calf stretch                        Bike 5' 5'  5' 5'   Neuro Re-Ed        Glute squeeze        Quad sets LAQ 2x10 1.5# LAQ 2x10      SLR        Clamshells Seated RTB 2x10 Seated GTB 2x10      Bridge     1x10   Multifidi activation X25 ball press    10x10\"   Pelvic tilts      1x10 " "  Seated marches 2x20       Standing marches        Heel raises  x20      Toe raises  x20      Adduction Seated adductor squeeze 20x5\"    20x5\"   Wobble board        HS curls        Pallof press  2x10 red ball      Seated glute press        Scap squeeze                                 Re-evaluation    AK     Ther Act         Education                Step ups  2x5 4\"               Modalities                                                                             "

## 2024-05-16 ENCOUNTER — OFFICE VISIT (OUTPATIENT)
Dept: FAMILY MEDICINE CLINIC | Facility: CLINIC | Age: 74
End: 2024-05-16
Payer: COMMERCIAL

## 2024-05-16 ENCOUNTER — APPOINTMENT (OUTPATIENT)
Dept: LAB | Facility: CLINIC | Age: 74
End: 2024-05-16
Payer: COMMERCIAL

## 2024-05-16 ENCOUNTER — OFFICE VISIT (OUTPATIENT)
Dept: PHYSICAL THERAPY | Facility: CLINIC | Age: 74
End: 2024-05-16
Payer: COMMERCIAL

## 2024-05-16 VITALS
OXYGEN SATURATION: 98 % | HEIGHT: 61 IN | WEIGHT: 204 LBS | DIASTOLIC BLOOD PRESSURE: 80 MMHG | SYSTOLIC BLOOD PRESSURE: 120 MMHG | HEART RATE: 72 BPM | BODY MASS INDEX: 38.51 KG/M2

## 2024-05-16 DIAGNOSIS — R53.83 TIREDNESS: ICD-10-CM

## 2024-05-16 DIAGNOSIS — J41.0 SIMPLE CHRONIC BRONCHITIS (HCC): ICD-10-CM

## 2024-05-16 DIAGNOSIS — R53.82 CHRONIC FATIGUE: ICD-10-CM

## 2024-05-16 DIAGNOSIS — M62.838 MUSCLE SPASM: ICD-10-CM

## 2024-05-16 DIAGNOSIS — R10.2 SUPRAPUBIC PRESSURE: ICD-10-CM

## 2024-05-16 DIAGNOSIS — R35.0 URINARY FREQUENCY: ICD-10-CM

## 2024-05-16 DIAGNOSIS — I72.9 PSEUDOANEURYSM (HCC): ICD-10-CM

## 2024-05-16 DIAGNOSIS — R26.89 BALANCE PROBLEM: ICD-10-CM

## 2024-05-16 DIAGNOSIS — M54.50 CHRONIC BILATERAL LOW BACK PAIN WITHOUT SCIATICA: Primary | ICD-10-CM

## 2024-05-16 DIAGNOSIS — J32.0 CHRONIC MAXILLARY SINUSITIS: ICD-10-CM

## 2024-05-16 DIAGNOSIS — I10 BENIGN ESSENTIAL HYPERTENSION: ICD-10-CM

## 2024-05-16 DIAGNOSIS — R29.898 WEAKNESS OF BOTH LOWER EXTREMITIES: ICD-10-CM

## 2024-05-16 DIAGNOSIS — G89.29 CHRONIC BILATERAL LOW BACK PAIN WITHOUT SCIATICA: Primary | ICD-10-CM

## 2024-05-16 DIAGNOSIS — K21.9 GASTROESOPHAGEAL REFLUX DISEASE, UNSPECIFIED WHETHER ESOPHAGITIS PRESENT: ICD-10-CM

## 2024-05-16 DIAGNOSIS — R09.82 PND (POST-NASAL DRIP): ICD-10-CM

## 2024-05-16 DIAGNOSIS — R09.81 NASAL CONGESTION: ICD-10-CM

## 2024-05-16 DIAGNOSIS — E66.01 OBESITY, MORBID (HCC): Primary | ICD-10-CM

## 2024-05-16 LAB
25(OH)D3 SERPL-MCNC: 21.7 NG/ML (ref 30–100)
ALBUMIN SERPL BCP-MCNC: 4.1 G/DL (ref 3.5–5)
ALP SERPL-CCNC: 62 U/L (ref 34–104)
ALT SERPL W P-5'-P-CCNC: 10 U/L (ref 7–52)
ANION GAP SERPL CALCULATED.3IONS-SCNC: 9 MMOL/L (ref 4–13)
AST SERPL W P-5'-P-CCNC: 15 U/L (ref 13–39)
BACTERIA UR QL AUTO: ABNORMAL /HPF
BASOPHILS # BLD AUTO: 0.04 THOUSANDS/ÂΜL (ref 0–0.1)
BASOPHILS NFR BLD AUTO: 1 % (ref 0–1)
BILIRUB SERPL-MCNC: 0.55 MG/DL (ref 0.2–1)
BILIRUB UR QL STRIP: NEGATIVE
BUN SERPL-MCNC: 12 MG/DL (ref 5–25)
CALCIUM SERPL-MCNC: 9.2 MG/DL (ref 8.4–10.2)
CHLORIDE SERPL-SCNC: 102 MMOL/L (ref 96–108)
CLARITY UR: CLEAR
CO2 SERPL-SCNC: 29 MMOL/L (ref 21–32)
COLOR UR: ABNORMAL
CREAT SERPL-MCNC: 0.64 MG/DL (ref 0.6–1.3)
CRP SERPL QL: 2.9 MG/L
EOSINOPHIL # BLD AUTO: 0.15 THOUSAND/ÂΜL (ref 0–0.61)
EOSINOPHIL NFR BLD AUTO: 3 % (ref 0–6)
ERYTHROCYTE [DISTWIDTH] IN BLOOD BY AUTOMATED COUNT: 13.6 % (ref 11.6–15.1)
ERYTHROCYTE [SEDIMENTATION RATE] IN BLOOD: 25 MM/HOUR (ref 0–29)
GFR SERPL CREATININE-BSD FRML MDRD: 88 ML/MIN/1.73SQ M
GLUCOSE P FAST SERPL-MCNC: 92 MG/DL (ref 65–99)
GLUCOSE UR STRIP-MCNC: NEGATIVE MG/DL
HCT VFR BLD AUTO: 40.3 % (ref 34.8–46.1)
HGB BLD-MCNC: 12.7 G/DL (ref 11.5–15.4)
HGB UR QL STRIP.AUTO: ABNORMAL
IMM GRANULOCYTES # BLD AUTO: 0.01 THOUSAND/UL (ref 0–0.2)
IMM GRANULOCYTES NFR BLD AUTO: 0 % (ref 0–2)
KETONES UR STRIP-MCNC: NEGATIVE MG/DL
LEUKOCYTE ESTERASE UR QL STRIP: NEGATIVE
LYMPHOCYTES # BLD AUTO: 1.53 THOUSANDS/ÂΜL (ref 0.6–4.47)
LYMPHOCYTES NFR BLD AUTO: 25 % (ref 14–44)
MCH RBC QN AUTO: 29.1 PG (ref 26.8–34.3)
MCHC RBC AUTO-ENTMCNC: 31.5 G/DL (ref 31.4–37.4)
MCV RBC AUTO: 92 FL (ref 82–98)
MONOCYTES # BLD AUTO: 0.35 THOUSAND/ÂΜL (ref 0.17–1.22)
MONOCYTES NFR BLD AUTO: 6 % (ref 4–12)
NEUTROPHILS # BLD AUTO: 4 THOUSANDS/ÂΜL (ref 1.85–7.62)
NEUTS SEG NFR BLD AUTO: 65 % (ref 43–75)
NITRITE UR QL STRIP: NEGATIVE
NON-SQ EPI CELLS URNS QL MICRO: ABNORMAL /HPF
NRBC BLD AUTO-RTO: 0 /100 WBCS
PH UR STRIP.AUTO: 6 [PH]
PLATELET # BLD AUTO: 329 THOUSANDS/UL (ref 149–390)
PMV BLD AUTO: 9.4 FL (ref 8.9–12.7)
POTASSIUM SERPL-SCNC: 4 MMOL/L (ref 3.5–5.3)
PROT SERPL-MCNC: 7 G/DL (ref 6.4–8.4)
PROT UR STRIP-MCNC: ABNORMAL MG/DL
RBC # BLD AUTO: 4.37 MILLION/UL (ref 3.81–5.12)
RBC #/AREA URNS AUTO: ABNORMAL /HPF
SODIUM SERPL-SCNC: 140 MMOL/L (ref 135–147)
SP GR UR STRIP.AUTO: 1.02 (ref 1–1.03)
TSH SERPL DL<=0.05 MIU/L-ACNC: 1.29 UIU/ML (ref 0.45–4.5)
UROBILINOGEN UR STRIP-ACNC: <2 MG/DL
WBC # BLD AUTO: 6.08 THOUSAND/UL (ref 4.31–10.16)
WBC #/AREA URNS AUTO: ABNORMAL /HPF

## 2024-05-16 PROCEDURE — 85652 RBC SED RATE AUTOMATED: CPT

## 2024-05-16 PROCEDURE — 99214 OFFICE O/P EST MOD 30 MIN: CPT | Performed by: FAMILY MEDICINE

## 2024-05-16 PROCEDURE — 85025 COMPLETE CBC W/AUTO DIFF WBC: CPT

## 2024-05-16 PROCEDURE — G2211 COMPLEX E/M VISIT ADD ON: HCPCS | Performed by: FAMILY MEDICINE

## 2024-05-16 PROCEDURE — 36415 COLL VENOUS BLD VENIPUNCTURE: CPT

## 2024-05-16 PROCEDURE — 84443 ASSAY THYROID STIM HORMONE: CPT

## 2024-05-16 PROCEDURE — 80053 COMPREHEN METABOLIC PANEL: CPT

## 2024-05-16 PROCEDURE — 97112 NEUROMUSCULAR REEDUCATION: CPT

## 2024-05-16 PROCEDURE — 82306 VITAMIN D 25 HYDROXY: CPT

## 2024-05-16 PROCEDURE — 86140 C-REACTIVE PROTEIN: CPT

## 2024-05-16 PROCEDURE — 97110 THERAPEUTIC EXERCISES: CPT

## 2024-05-16 RX ORDER — AZITHROMYCIN 250 MG/1
TABLET, FILM COATED ORAL
Qty: 6 TABLET | Refills: 0 | Status: SHIPPED | OUTPATIENT
Start: 2024-05-16 | End: 2024-05-20

## 2024-05-16 RX ORDER — ESOMEPRAZOLE MAGNESIUM 40 MG/1
40 CAPSULE, DELAYED RELEASE ORAL
Qty: 90 CAPSULE | Refills: 3 | Status: SHIPPED | OUTPATIENT
Start: 2024-05-16 | End: 2025-05-11

## 2024-05-16 RX ORDER — VALSARTAN 40 MG/1
20 TABLET ORAL DAILY
Qty: 90 TABLET | Refills: 1 | Status: SHIPPED | OUTPATIENT
Start: 2024-05-16

## 2024-05-16 NOTE — PROGRESS NOTES
"Daily Note     Today's date: 2024  Patient name: Enedina Howard  : 1950  MRN: [id number]  Referring provider: Evita Dunlap*  Dx:   Encounter Diagnosis     ICD-10-CM    1. Chronic bilateral low back pain without sciatica  M54.50     G89.29       2. Balance problem  R26.89       3. Weakness of both lower extremities  R29.898           Start Time: 1000  Stop Time: 1045  Total time in clinic (min): 45 minutes    Subjective: Pt reports that she is feeling a little better today as compared to last treatment session.        Objective: See treatment diary below      Assessment: Tolerated treatment well. Began today with warm up on bike to work toward improving LE muscular endurance.  Moved to strengthening as well as stretching to pts tolerance.  Pt was able to move further into yoga ball stretching today due to decreased pain.  Moved onto seated strengthening activities to pt tolerance.  Pt had most fatigue with heel and toe raises today.   Patient demonstrated fatigue post treatment, exhibited good technique with therapeutic exercises, and would benefit from continued PT      Plan: Continue per plan of care.       POC Expires Auth Status Start Date Expiration Date PT Visit Limit     No auth   BOMN   Date - Re eval    Used 11 12 8 9 10   Remaining          Diagnosis: Low back pain   Precautions:    Primary Goals:    *asterisks by exercise = given for HEP   Manuals                                            There Ex        LTR     x20   SKTC        DLTC        YB roll out X20 forward, lat X20 forward, lat X20 forward, lat  X20 forward, lat   HS stretch        Calf stretch                        Bike 5' 5' 5'  5'   Neuro Re-Ed        Glute squeeze        Quad sets LAQ 2x10 1.5# LAQ 2x10      SLR        Clamshells Seated RTB 2x10 Seated GTB 2x10      Bridge     1x10   Multifidi activation X25 ball press  X25 ball press  10x10\"   Pelvic tilts      1x10 " "  Seated marches 2x20  2x10     Standing marches        Heel raises  x20 20x5\"     Toe raises  x20 20x5\"     Adduction Seated adductor squeeze 20x5\"  Seated adductor squeeze 20x5\"  20x5\"   Wobble board        HS curls        Pallof press  2x10 red ball      Seated glute press        Scap squeeze                                 Re-evaluation         Ther Act         Education                Step ups  2x5 4\"               Modalities                                                                               "

## 2024-05-17 ENCOUNTER — TELEPHONE (OUTPATIENT)
Dept: GASTROENTEROLOGY | Facility: CLINIC | Age: 74
End: 2024-05-17

## 2024-05-17 NOTE — TELEPHONE ENCOUNTER
Called patient to reschedule 1 yr fu ov,(9/ no answer, left message for patient to return call to reschedule appt.

## 2024-05-19 NOTE — PROGRESS NOTES
Outpatient Note- Follow up     HPI:     Enedina Howard , 73 y.o. female  presents today for for multiple concerns.  Patient today with multiple concerns presentation.  She has a significant past medical history of COPD, GERD, Arthritis, depression, anxiety, hyperlipidemia, hypertension, and peripheral artery disease.  She has been taking medication appropriately, and since she was having some lightheadedness and dizziness with the lower blood pressure she has reduced her Diovan from 40 mg to 20 mg.  She was previously transitioned off of Nexium to a twice daily dosing medication, but it was not as effective as Nexium and she has returned to 40 mg daily.    She is interested in doing having labs performed to follow-up for her chronic issues.  She is interested in having some inflammatory markers evaluated since she has been having increased arthritic symptoms.    Lastly the patient has been having upper respiratory symptoms.  She has been having increased nasal congestion, runny nose, and cough.  She typically gets bronchitis if she waits too long, and is interested in an antibiotic to help prevent further issues or concerns.        Past Medical History:   Diagnosis Date    Abnormal ECG 2010 ?    Arthritis     Asthma     Chronic pain disorder     Colon polyp     COPD (chronic obstructive pulmonary disease) (HCC)     Coronary artery disease     Fibromyalgia, primary     GERD (gastroesophageal reflux disease)     High blood pressure     History of transfusion     Hyperlipidemia     Hypertension     Inflammatory bowel disease 2002    Irritable bowel syndrome     Kidney stone     Myocardial infarction (HCC) ?    Silent heart attack    Pneumonia 2023    PONV (postoperative nausea and vomiting)     Urinary tract infection Past 4 years    Frequent    Varicella     Visual impairment 2023    Cataracts, dry, macular degeneration      ROS:   Review of Systems   See HPI    OBJECTIVE  Vitals:    05/16/24 0855   BP: 120/80    Pulse: 72   SpO2: 98%        Physical Exam   No PE performed    ASSESSMENT AND PLAN   Enedina was seen today for follow-up.  Diagnoses and all orders for this visit:    Benign essential hypertension  Patient previously had issues with low blood pressure.  She is taking Diovan 20 mg daily.  Blood pressure stable on presentation at 120/80.  Patient to continue lower dose, updated in chart  -     valsartan (DIOVAN) 40 mg tablet; Take 0.5 tablets (20 mg total) by mouth daily    Simple chronic bronchitis (HCC)  Nasal congestion  PND (post-nasal drip)  Chronic maxillary sinusitis  Patient presents with upper respiratory symptoms.  She has chronic bronchitis, but also nasal congestion, postnasal drip, and sinusitis.  Since symptoms quickly moved into her chest, she recommended medication to help prevent this.  Will treat with Z-Stewart and see if this improves her symptoms.  On care gaps she has not obtained the RSV vaccine, would consider having her follow-up with this due to increased risk.  -     azithromycin (ZITHROMAX) 250 mg tablet; Take 2 tablets today then 1 tablet daily x 4 days    Chronic fatigue  Tiredness  Muscle spasm  Patient has been having increased chronic fatigue and tiredness.  This is the main reason for her concerns and request for lab work.  We will look into inflammatory markers, vitamin D, blood counts, kidney function, electrolytes, and thyroid function.  -     Magnesium; Future  -     TSH, 3rd generation with Free T4 reflex; Future  -     CBC and differential; Future  -     Comprehensive metabolic panel; Future  -     Vitamin D 25 hydroxy; Future  -     C-reactive protein; Future  -     Sedimentation rate, automated; Future    Gastroesophageal reflux disease, unspecified whether esophagitis present  History of GERD.  Currently being treated with Nexium 40 mg daily.  -     esomeprazole (NexIUM) 40 MG capsule; Take 1 capsule (40 mg total) by mouth daily in the early morning    Suprapubic  pressure  Urinary frequency  Increase suprapubic pressure recently.  She has mild increased frequency, requesting UA  -     UA w Reflex to Microscopic w Reflex to Culture; Future    Pseudoaneurysm (HCC)  Follows with vascular, Dr. Sorto Doctor. She monitors her graft after surgical complication     Body mass index (BMI) 38.0-38.9, adult  Obesity, morbid (HCC)  Increasing weight.  Patient admits to weight gain over the last 6 months.  She is going to be working to reduce this, but diagnosis was needed to evaluate for vitamin D.  -     Vitamin D 25 hydroxy; Future             DO [facility]  5/19/2024 5:34 AM

## 2024-05-20 ENCOUNTER — OFFICE VISIT (OUTPATIENT)
Dept: PHYSICAL THERAPY | Facility: CLINIC | Age: 74
End: 2024-05-20
Payer: COMMERCIAL

## 2024-05-20 DIAGNOSIS — R26.89 BALANCE PROBLEM: ICD-10-CM

## 2024-05-20 DIAGNOSIS — M54.50 CHRONIC BILATERAL LOW BACK PAIN WITHOUT SCIATICA: Primary | ICD-10-CM

## 2024-05-20 DIAGNOSIS — G89.29 CHRONIC BILATERAL LOW BACK PAIN WITHOUT SCIATICA: Primary | ICD-10-CM

## 2024-05-20 DIAGNOSIS — R29.898 WEAKNESS OF BOTH LOWER EXTREMITIES: ICD-10-CM

## 2024-05-20 PROCEDURE — 97112 NEUROMUSCULAR REEDUCATION: CPT

## 2024-05-20 NOTE — PROGRESS NOTES
"Daily Note     Today's date: 2024  Patient name: Enedina Howard  : 1950  MRN: [id number]  Referring provider: Evita Dunlap*  Dx:   Encounter Diagnosis     ICD-10-CM    1. Chronic bilateral low back pain without sciatica  M54.50     G89.29       2. Balance problem  R26.89       3. Weakness of both lower extremities  R29.898           Start Time: 1345  Stop Time: 1430  Total time in clinic (min): 45 minutes    Subjective: Pt reports that her hips have been hurting her a bit tonight.        Objective: See treatment diary below      Assessment: Tolerated treatment well. Pt entered physical therapy today with some increased pain in the hips.  Continued onto bike with good tolerance and rest breaks taken as needed.  Continued strengthening with rest breaks to calm pain as needed. Patient demonstrated fatigue post treatment, exhibited good technique with therapeutic exercises, and would benefit from continued PT      Plan: Continue per plan of care.       POC Expires Auth Status Start Date Expiration Date PT Visit Limit     No auth   BOMN   Date    Used 11 12 13 14 10   Remaining          Diagnosis: Low back pain   Precautions:    Primary Goals:    *asterisks by exercise = given for HEP   Manuals                                            There Ex        LTR     x20   SKTC        DLTC        YB roll out X20 forward, lat X20 forward, lat X20 forward, lat X20 forward, lat X20 forward, lat   HS stretch        Calf stretch                        Bike 5' 5' 5' 5' 5'   Neuro Re-Ed        Glute squeeze    20x5\"    Quad sets LAQ 2x10 1.5# LAQ 2x10      SLR        Clamshells Seated RTB 2x10 Seated GTB 2x10      Bridge     1x10   Multifidi activation X25 ball press  X25 ball press  10x10\"   Pelvic tilts      1x10   Seated marches 2x20  2x10     Standing marches        Heel raises  x20 20x5\" 20x5\"    Toe raises  x20 20x5\" 20x5\"    Adduction Seated adductor " "squeeze 20x5\"  Seated adductor squeeze 20x5\" Seated adductor squeeze 20x5\" 20x5\"   Wobble board    X20 ea    HS curls        Pallof press  2x10 red ball      Seated glute press        Scap squeeze                                 Re-evaluation         Ther Act         Education                Step ups  2x5 4\"               Modalities                                                                                 "

## 2024-05-23 ENCOUNTER — APPOINTMENT (OUTPATIENT)
Dept: PHYSICAL THERAPY | Facility: CLINIC | Age: 74
End: 2024-05-23
Payer: COMMERCIAL

## 2024-05-24 ENCOUNTER — TELEPHONE (OUTPATIENT)
Dept: GASTROENTEROLOGY | Facility: CLINIC | Age: 74
End: 2024-05-24

## 2024-05-28 ENCOUNTER — OFFICE VISIT (OUTPATIENT)
Dept: PHYSICAL THERAPY | Facility: CLINIC | Age: 74
End: 2024-05-28
Payer: COMMERCIAL

## 2024-05-28 DIAGNOSIS — M54.50 CHRONIC BILATERAL LOW BACK PAIN WITHOUT SCIATICA: Primary | ICD-10-CM

## 2024-05-28 DIAGNOSIS — G89.29 CHRONIC BILATERAL LOW BACK PAIN WITHOUT SCIATICA: Primary | ICD-10-CM

## 2024-05-28 PROCEDURE — 97112 NEUROMUSCULAR REEDUCATION: CPT

## 2024-05-28 PROCEDURE — 97110 THERAPEUTIC EXERCISES: CPT

## 2024-05-28 NOTE — PROGRESS NOTES
"Daily Note     Today's date: 2024  Patient name: Enedina Howard  : 1950  MRN: [id number]  Referring provider: Evita Dunlap*  Dx:   Encounter Diagnosis     ICD-10-CM    1. Chronic bilateral low back pain without sciatica  M54.50     G89.29           Start Time: 1400  Stop Time: 1445  Total time in clinic (min): 45 minutes    Subjective: Pt reports that her low back is a little sore today.        Objective: See treatment diary below      Assessment: Tolerated treatment well. Began treatment today with bike to improve LE endurance.  Moved onto stretching of the low back in supine.  Performed exercises in supine as well.  Pt was able to perform SLR  as well as glute bridges today. Pt left treatment session with fatigue in the LE.  Patient demonstrated fatigue post treatment, exhibited good technique with therapeutic exercises, and would benefit from continued PT      Plan: Continue per plan of care.       POC Expires Auth Status Start Date Expiration Date PT Visit Limit     No auth   BOMN   Date    Used 11 12 13 14 15   Remaining          Diagnosis: Low back pain   Precautions:    Primary Goals:    *asterisks by exercise = given for HEP   Manuals                                            There Ex        LTR        SKTC        DLTC        YB roll out X20 forward, lat X20 forward, lat X20 forward, lat X20 forward, lat X20 forward, lat   HS stretch        Calf stretch                        Bike 5' 5' 5' 5' 5'   Neuro Re-Ed        Glute squeeze    20x5\"    Quad sets LAQ 2x10 1.5# LAQ 2x10      SLR     1x10 B   Clamshells Seated RTB 2x10 Seated GTB 2x10      Bridge        Multifidi activation X25 ball press  X25 ball press  10x10\"   Pelvic tilts         Seated marches 2x20  2x10     Standing marches        Heel raises  x20 20x5\" 20x5\"    Toe raises  x20 20x5\" 20x5\" 20x5\"   Adduction Seated adductor squeeze 20x5\"  Seated adductor squeeze 20x5\" Seated " "adductor squeeze 20x5\" 20x5\"   Wobble board    X20 ea    HS curls        Pallof press  2x10 red ball      Seated glute press        Scap squeeze                                 Re-evaluation         Ther Act         Education                Step ups  2x5 4\"               Modalities                                                                                   "

## 2024-05-30 ENCOUNTER — EVALUATION (OUTPATIENT)
Dept: PHYSICAL THERAPY | Facility: CLINIC | Age: 74
End: 2024-05-30
Payer: COMMERCIAL

## 2024-05-30 DIAGNOSIS — R26.89 BALANCE PROBLEM: ICD-10-CM

## 2024-05-30 DIAGNOSIS — G89.29 CHRONIC BILATERAL LOW BACK PAIN WITHOUT SCIATICA: Primary | ICD-10-CM

## 2024-05-30 DIAGNOSIS — M54.50 CHRONIC BILATERAL LOW BACK PAIN WITHOUT SCIATICA: Primary | ICD-10-CM

## 2024-05-30 DIAGNOSIS — R29.898 WEAKNESS OF BOTH LOWER EXTREMITIES: ICD-10-CM

## 2024-05-30 PROCEDURE — 97112 NEUROMUSCULAR REEDUCATION: CPT

## 2024-05-30 PROCEDURE — 97110 THERAPEUTIC EXERCISES: CPT

## 2024-05-30 NOTE — PROGRESS NOTES
PT Re-Evaluation     Today's date: 2024  Patient name: Enedina Howard  : 1950  MRN: [id number]  Referring provider: Evita Dunlap*  Dx:   Encounter Diagnosis     ICD-10-CM    1. Chronic bilateral low back pain without sciatica  M54.50     G89.29       2. Balance problem  R26.89       3. Weakness of both lower extremities  R29.898               Start Time: 1000  Stop Time: 1045  Total time in clinic (min): 45 minutes    Assessment  Impairments: abnormal coordination, abnormal gait, abnormal muscle firing, abnormal muscle tone, abnormal or restricted ROM, activity intolerance, impaired balance, impaired physical strength, lacks appropriate home exercise program, pain with function, safety issue, weight-bearing intolerance, poor posture  and poor body mechanics  Symptom irritability: moderate    Assessment details: Enedina Howard has been compliant with attending PT and home exercise program since initial eval.  Enedina  has made improvements in objective data since initial eval but continues to have limitations compared to prior level of function. She presents with improving strength, though continues with challenge with core and hip flexor strength.  She is alsop resenting with improved posture during walking, though continues to have pain after a few minutes from muscular fatigue.  She reports feeling as though she is getting steadier, though would like to continue to work on balance throughout the community.  Enedina continues to have deficits in the above listed impairments and would benefit from additional skilled PT to address these deficits to return to prior level of function.        Understanding of Dx/Px/POC: good     Prognosis: good  Prognosis details: Positive prognostic indicators include positive attitude toward recovery and good understanding of diagnosis and treatment plan options.  Negative prognostic indicators include chronicity of symptoms, high symptom irritability, and  multiple concurrent orthopedic problems.      Goals  Impairment Goals 4-6 weeks  - Decrease pain to <2/10 - progressing   - Improve lumbar AROM to >80% throughout- progressing   - Increase hip strength to 4+/5 throughout- progressing   - Increase core strength to be able to sit straight up without deviation- progressing     Functional Goals 6-8 weeks  - Return to Prior Level of Function- progressing   - Patient will be independent with HEP- progressing   - Patient will be able to sit without increased pain/compensation/difficulty- progressing   - Patient will be able to perform sit to stand without increased pain/compensation/difficulty - met  - Patient will be able to bend forward without increased pain/compensation/difficulty - progressing   - Patient will be able to lift >20 pounds with proper mechanics and no pain- progressing       Plan  Patient would benefit from: skilled physical therapy  Planned modality interventions: Modalities PRN.    Planned therapy interventions: activity modification, manual therapy, neuromuscular re-education, patient education, therapeutic activities, therapeutic exercise, graded activity, home exercise program, behavior modification and self care    Frequency: 2x week  Duration in weeks: 4  Treatment plan discussed with: patient        Subjective Evaluation    History of Present Illness  Mechanism of injury: Re-eval 5/30:  Pt reports that she feels as though she is about 55% better as compared to when she first began. She reports that when she leaves physical therapy she feels much better.  She also has decreased discomfort with bed mobility.  She has trouble walking for about 5 min along with increased stress.      Re-eval 4/29:  Pt reports that she feels as though she is about 50% better as compared to when she first began.  She reports that she feels as though she is able to sleep a lot better and having less pain.  She reports that she is also negotiating steps better.  She  reports that she has more confidence in her balance.  She feels as though she would benefit from increased strength in the LE as well as continuing with balance.  She reports that she would like to continue working on her back as well, reporting needing some strengthening as well.        HOME LIFE: live alone, 1 flight (19) steps,   HOBBIES/EXERCISE: reading, watching documentaries/movies, meet with friends,   PLOF:  R knee replacement about 5 years ago, scoliosis,   HISTORY OF CURRENT INJURY:  Pt reports she has been getting stiffer since COVID since she had stayed home for the most part.  Low back pain began years ago and has slowly increased.  She reports decreased core and LE strength, as well as improve her balance.  PAIN LOCATION/DESCRIPTORS: low back, B LE  AGGRAVATING FACTORS:  stepping into bath tub, vacuum, sweeping, cooking, sleeping, balance activities,   DAY PATTERN: after activities,   IMAGING: bone scan, x-ray (shoulders),    Enedina denies a new onset of Bladder incontinence, Bowel dysfunction, Dizziness, Dysphagia, Dysarthria, Drop attacks, Diplopia, Nausea, Ataxia, Recent unexplained weight loss, Clumsy or unsteadiness, Constant night pain, History of cancer, Tingling, Numbness, and Saddle anesthesia .  PATIENT GOALS: feel safe while walking and standing,     Patient Goals  Patient goals for therapy: increased motion, improved balance, return to sport/leisure activities, independence with ADLs/IADLs, increased strength, decreased pain and decreased edema    Pain  Current pain ratin  At best pain ratin  At worst pain ratin  Quality: burning, cramping and tight  Relieving factors: relaxation and rest  Aggravating factors: walking, standing, stair climbing and lifting          Objective     Postural Observations  Seated posture: poor  Standing posture: fair      Palpation   Left   Tenderness of the erector spinae, lumbar paraspinals and quadratus lumborum.     Right   Tenderness of the  "erector spinae, lumbar paraspinals and quadratus lumborum.     Active Range of Motion     Additional Active Range of Motion Details  LS AROM:   Flexion: 75%  Extension: 30% pain  SideBending right: 50% discomfort  SideBending left: 50% discomfort  Rotation right: 80%   Rotation left: 80%     Strength/Myotome Testing     Left Hip   Planes of Motion   Flexion: 4-  Extension: 4  Abduction: 4  External rotation: 4  Internal rotation: 4    Right Hip   Planes of Motion   Flexion: 4-  Extension: 4  Abduction: 4  External rotation: 4  Internal rotation: 4    Left Knee   Flexion: 4+  Extension: 4+    Right Knee   Flexion: 4+  Extension: 4+    Left Ankle/Foot   Dorsiflexion: 4+    Right Ankle/Foot   Dorsiflexion: 4+    Additional Strength Details  Cramping only during hip flexion testing    Tests     Additional Tests Details  TU sec no AD, no HHA  30x sit to stand: 11 no HHA              POC Expires Auth Status Start Date Expiration Date PT Visit Limit     No auth   BOMN   Date - re-eval    Used 16 12 13 14 15   Remaining          Diagnosis: Low back pain   Precautions:    Primary Goals:    *asterisks by exercise = given for HEP   Manuals                                            There Ex        LTR        SKTC        DLTC        YB roll out  X20 forward, lat X20 forward, lat X20 forward, lat X20 forward, lat   HS stretch        Calf stretch                        Bike 5' 5' 5' 5' 5'   Neuro Re-Ed        Glute squeeze    20x5\"    Quad sets  LAQ 2x10      SLR     1x10 B   Clamshells  Seated GTB 2x10      Bridge        Multifidi activation   X25 ball press  10x10\"   Pelvic tilts         Seated marches   2x10     Standing marches        Heel raises  x20 20x5\" 20x5\"    Toe raises  x20 20x5\" 20x5\" 20x5\"   Adduction 20x5\"  Seated adductor squeeze 20x5\" Seated adductor squeeze 20x5\" 20x5\"   Wobble board    X20 ea    HS curls        Pallof press  2x10 red ball      Seated glute " "press        Scap squeeze                                 Re-evaluation         Ther Act         Education                Step ups  2x5 4\"               Modalities                                                                           "

## 2024-06-02 ENCOUNTER — TELEPHONE (OUTPATIENT)
Dept: FAMILY MEDICINE CLINIC | Facility: CLINIC | Age: 74
End: 2024-06-02

## 2024-06-02 DIAGNOSIS — R79.89 LOW SERUM VITAMIN D: Primary | ICD-10-CM

## 2024-06-02 RX ORDER — ERGOCALCIFEROL 1.25 MG/1
50000 CAPSULE ORAL WEEKLY
Qty: 12 CAPSULE | Refills: 0 | Status: SHIPPED | OUTPATIENT
Start: 2024-06-02

## 2024-06-02 NOTE — TELEPHONE ENCOUNTER
Called patient and reviewed that I took a look at her labs.  Her vitamin D is low, I will send over her medication to the pharmacy 50,000 units weekly.  If the patient is already taking some, she is to discuss with me on follow-up and/or over the phone.    Additionally there is no evidence of the reason for her increased arthritis or inflammatory markers.  This may be more of arthritis may need to consider PT and/ more tylenol or multimodal approach.     DO Peri Mckeon [facility]  6/2/2024 3:13 PM

## 2024-06-04 ENCOUNTER — OFFICE VISIT (OUTPATIENT)
Dept: PHYSICAL THERAPY | Facility: CLINIC | Age: 74
End: 2024-06-04
Payer: COMMERCIAL

## 2024-06-04 DIAGNOSIS — G89.29 CHRONIC BILATERAL LOW BACK PAIN WITHOUT SCIATICA: Primary | ICD-10-CM

## 2024-06-04 DIAGNOSIS — R26.89 BALANCE PROBLEM: ICD-10-CM

## 2024-06-04 DIAGNOSIS — M54.50 CHRONIC BILATERAL LOW BACK PAIN WITHOUT SCIATICA: Primary | ICD-10-CM

## 2024-06-04 DIAGNOSIS — R29.898 WEAKNESS OF BOTH LOWER EXTREMITIES: ICD-10-CM

## 2024-06-04 PROCEDURE — 97112 NEUROMUSCULAR REEDUCATION: CPT

## 2024-06-04 PROCEDURE — 97110 THERAPEUTIC EXERCISES: CPT

## 2024-06-04 NOTE — PROGRESS NOTES
"Daily Note     Today's date: 2024  Patient name: Enedina Howard  : 1950  MRN: [id number]  Referring provider: Evita Dunlap*  Dx:   Encounter Diagnosis     ICD-10-CM    1. Chronic bilateral low back pain without sciatica  M54.50     G89.29       2. Balance problem  R26.89       3. Weakness of both lower extremities  R29.898           Start Time: 1000  Stop Time: 1045  Total time in clinic (min): 45 minutes    Subjective: Pt reports that she did not have much pain over the weekend and it only returned this morning.       Objective: See treatment diary below      Assessment: Tolerated treatment well. Began treatment with bike to improve LE muscular endurance and strength.  Continued to yoga ball roll out with good stretch noted. Added hip strengthening next.  Pt had challenge during marches today though was able to make it through 2 sets.  Added activities to promote improve balance including [place] board taps as well as standing marches with no HHA. Patient demonstrated fatigue post treatment, exhibited good technique with therapeutic exercises, and would benefit from continued PT      Plan: Continue per plan of care.       POC Expires Auth Status Start Date Expiration Date PT Visit Limit     No auth   BOMN   Date - re-eval    Used 16 17 13 14 15   Remaining          Diagnosis: Low back pain   Precautions:    Primary Goals:    *asterisks by exercise = given for HEP   Manuals                                            There Ex        LTR        SKTC        DLTC        YB roll out  x20 X20 forward, lat X20 forward, lat X20 forward, lat   HS stretch        Calf stretch                        Bike 5' 5' 5' 5' 5'   Neuro Re-Ed        Glute squeeze    20x5\"    Quad sets        SLR  2x10 B   1x10 B   Clamshells        Bridge        Multifidi activation   X25 ball press  10x10\"   Pelvic tilts         Seated marches  2x10 seated 2x10     Standing marches  " "2x10 balance      Heel raises   20x5\" 20x5\"    Toe raises   20x5\" 20x5\" 20x5\"   Adduction 20x5\" 20x5\" Seated adductor squeeze 20x5\" Seated adductor squeeze 20x5\" 20x5\"   Wobble board    X20 ea    HS curls        Pallof press  2x10 yellow ball      Seated glute press        Scap squeeze        AB  X20 GTB                       Re-evaluation         Ther Act         Education                Step ups                 Modalities                                                                           "

## 2024-06-07 ENCOUNTER — OFFICE VISIT (OUTPATIENT)
Dept: PHYSICAL THERAPY | Facility: CLINIC | Age: 74
End: 2024-06-07
Payer: COMMERCIAL

## 2024-06-07 DIAGNOSIS — M54.50 CHRONIC BILATERAL LOW BACK PAIN WITHOUT SCIATICA: Primary | ICD-10-CM

## 2024-06-07 DIAGNOSIS — R26.89 BALANCE PROBLEM: ICD-10-CM

## 2024-06-07 DIAGNOSIS — R29.898 WEAKNESS OF BOTH LOWER EXTREMITIES: ICD-10-CM

## 2024-06-07 DIAGNOSIS — G89.29 CHRONIC BILATERAL LOW BACK PAIN WITHOUT SCIATICA: Primary | ICD-10-CM

## 2024-06-07 PROCEDURE — 97112 NEUROMUSCULAR REEDUCATION: CPT

## 2024-06-07 PROCEDURE — 97110 THERAPEUTIC EXERCISES: CPT

## 2024-06-07 NOTE — PROGRESS NOTES
"Daily Note     Today's date: 2024  Patient name: Enedina Howard  : 1950  MRN: [id number]  Referring provider: Evita Dunlap*  Dx:   Encounter Diagnosis     ICD-10-CM    1. Chronic bilateral low back pain without sciatica  M54.50     G89.29       2. Balance problem  R26.89       3. Weakness of both lower extremities  R29.898           Start Time: 1008  Stop Time: 1035  Total time in clinic (min): 27 minutes    Subjective: Pt reports that she is feeling pretty good coming into treatment today.        Objective: See treatment diary below      Assessment: Tolerated treatment well. Pt arrived 8 min late to treatment today and had to leave early, so treatment time was decreased.  Began with bike to work on LE endurance.  Added stretching to decrease tightness in low back with good tolerance.  Moved onto strengthening for hips and core.  Added multifidi ball press today with good muscle activation noted. Patient demonstrated fatigue post treatment, exhibited good technique with therapeutic exercises, and would benefit from continued PT      Plan: Continue per plan of care.       POC Expires Auth Status Start Date Expiration Date PT Visit Limit     No auth   BOMN   Date - re-eval    Used 16 17 18 14 15   Remaining          Diagnosis: Low back pain   Precautions:    Primary Goals:    *asterisks by exercise = given for HEP   Manuals                                            There Ex        LTR        SKTC        DLTC        YB roll out  x20 X20 forward, lat X20 forward, lat X20 forward, lat   HS stretch        Calf stretch                        Bike 5' 5' 5' 5' 5'   Neuro Re-Ed        Glute squeeze    20x5\"    Quad sets        SLR  2x10 B   1x10 B   Clamshells        Bridge        Multifidi activation     10x10\"   Pelvic tilts         Seated marches  2x10 seated 2x10     Standing marches  2x10 balance      Heel raises    20x5\"    Toe raises    20x5\" 20x5\" " "  Adduction 20x5\" 20x5\" 20x5\" Seated adductor squeeze 20x5\" 20x5\"   Wobble board    X20 ea    HS curls        Pallof press  2x10 yellow ball      Seated glute press        Scap squeeze        AB  X20 GTB      Multifidi press   x30              Re-evaluation         Ther Act         Education                Step ups                 Modalities                                                                             "

## 2024-06-11 ENCOUNTER — OFFICE VISIT (OUTPATIENT)
Dept: PHYSICAL THERAPY | Facility: CLINIC | Age: 74
End: 2024-06-11
Payer: COMMERCIAL

## 2024-06-11 DIAGNOSIS — G89.29 CHRONIC BILATERAL LOW BACK PAIN WITHOUT SCIATICA: Primary | ICD-10-CM

## 2024-06-11 DIAGNOSIS — R26.89 BALANCE PROBLEM: ICD-10-CM

## 2024-06-11 DIAGNOSIS — R29.898 WEAKNESS OF BOTH LOWER EXTREMITIES: ICD-10-CM

## 2024-06-11 DIAGNOSIS — M54.50 CHRONIC BILATERAL LOW BACK PAIN WITHOUT SCIATICA: Primary | ICD-10-CM

## 2024-06-11 PROCEDURE — 97112 NEUROMUSCULAR REEDUCATION: CPT

## 2024-06-11 PROCEDURE — 97110 THERAPEUTIC EXERCISES: CPT

## 2024-06-11 NOTE — PROGRESS NOTES
"Daily Note     Today's date: 2024  Patient name: Enedina Howard  : 1950  MRN: [id number]  Referring provider: Evita Dunlap*  Dx:   Encounter Diagnosis     ICD-10-CM    1. Chronic bilateral low back pain without sciatica  M54.50     G89.29       2. Balance problem  R26.89       3. Weakness of both lower extremities  R29.898           Start Time: 1000  Stop Time: 1045  Total time in clinic (min): 45 minutes    Subjective: Pt reports her low back has been getting tightness and the stretching helps.       Objective: See treatment diary below      Assessment: Tolerated treatment well. Pt began with bike to work on improving muscular endurance.  Pt moved onto strengthening to her tolerance.  She was feeling fatigued today and required rest breaks throughout.  Added core strengthening as well as low back strengthening.  Pt was able to tolerate balance activities on foam today to promote standing on sand. Added hip extension in standing as well with good tolerance though fatigue noted.  Patient demonstrated fatigue post treatment, exhibited good technique with therapeutic exercises, and would benefit from continued PT      Plan: Continue per plan of care.       POC Expires Auth Status Start Date Expiration Date PT Visit Limit     No auth   BOMN   Date - re-eval    Used 16 17 18 19 15   Remaining          Diagnosis: Low back pain   Precautions:    Primary Goals:    *asterisks by exercise = given for HEP   Manuals                                            There Ex        LTR        SKTC        DLTC        YB roll out  x20 X20 forward, lat X20 forward, lat X20 forward, lat   HS stretch        Calf stretch                        Bike 5' 5' 5' 5' 5'   Neuro Re-Ed        Glute squeeze        Quad sets        SLR  2x10 B   1x10 B   Clamshells        Bridge        Multifidi activation     10x10\"   Pelvic tilts         Seated marches  2x10 seated 2x10 2x10  " "  Standing marches  2x10 balance      Heel raises        Toe raises     20x5\"   Adduction 20x5\" 20x5\" 20x5\" 20x5\" 20x5\"   Wobble board    20x ea    HS curls        Pallof press  2x10 yellow ball      Seated glute press        Scap squeeze        AB  X20 GTB      Multifidi press   x30 x30    Standing 3 way    2x10 extenion             Re-evaluation         Ther Act         Education                Step ups        Foam balance    3x30 sec (self selected and feet together)                     Modalities                                                                               "

## 2024-06-14 ENCOUNTER — OFFICE VISIT (OUTPATIENT)
Dept: PHYSICAL THERAPY | Facility: CLINIC | Age: 74
End: 2024-06-14
Payer: COMMERCIAL

## 2024-06-14 DIAGNOSIS — R29.898 WEAKNESS OF BOTH LOWER EXTREMITIES: ICD-10-CM

## 2024-06-14 DIAGNOSIS — G89.29 CHRONIC BILATERAL LOW BACK PAIN WITHOUT SCIATICA: Primary | ICD-10-CM

## 2024-06-14 DIAGNOSIS — M54.50 CHRONIC BILATERAL LOW BACK PAIN WITHOUT SCIATICA: Primary | ICD-10-CM

## 2024-06-14 DIAGNOSIS — R26.89 BALANCE PROBLEM: ICD-10-CM

## 2024-06-14 PROCEDURE — 97112 NEUROMUSCULAR REEDUCATION: CPT

## 2024-06-14 NOTE — PROGRESS NOTES
"Daily Note     Today's date: 2024  Patient name: Enedina Howard  : 1950  MRN: [id number]  Referring provider: Evita Dunlap*  Dx:   Encounter Diagnosis     ICD-10-CM    1. Chronic bilateral low back pain without sciatica  M54.50     G89.29       2. Balance problem  R26.89       3. Weakness of both lower extremities  R29.898           Start Time: 1000  Stop Time: 1045  Total time in clinic (min): 45 minutes    Subjective: Pt reports that she is feeling pretty good today, she reports she did a lot of moving around this morning already.       Objective: See treatment diary below      Assessment: Tolerated treatment well. Began today with stretching for low back and followed up with bike.  Pt was able to increase some strengthening with adding 1# weight to LAQ today.  She had some muscle cramping in the R calf that quickly decreased with stretching.  Patient demonstrated fatigue post treatment, exhibited good technique with therapeutic exercises, and would benefit from continued PT      Plan: Continue per plan of care.       POC Expires Auth Status Start Date Expiration Date PT Visit Limit     No auth   BOMN   Date - re-eval    Used 16 17 18 19 20   Remaining          Diagnosis: Low back pain   Precautions:    Primary Goals:    *asterisks by exercise = given for HEP   Manuals                                            There Ex        LTR        SKTC        DLTC        YB roll out  x20 X20 forward, lat X20 forward, lat X20 forward, lat   HS stretch        Calf stretch                        Bike 5' 5' 5' 5' 5'   Neuro Re-Ed        Glute squeeze        Quad sets        SLR  2x10 B      Clamshells        Bridge        Multifidi activation        Pelvic tilts         Seated marches  2x10 seated 2x10 2x10 x20   Standing marches  2x10 balance      Heel raises     x20   Toe raises     x20   Adduction 20x5\" 20x5\" 20x5\" 20x5\" 20x5\"   Wobble board    20x ea  "   HS curls        Pallof press  2x10 yellow ball      Seated glute press        Scap squeeze        AB  X20 GTB   X20 GTB   Multifidi press   x30 x30    Standing 3 way    2x10 extenion    LAW     2x10 1#                    Re-evaluation         Ther Act         Education                Step ups        Foam balance    3x30 sec (self selected and feet together)                     Modalities

## 2024-06-18 ENCOUNTER — OFFICE VISIT (OUTPATIENT)
Dept: PHYSICAL THERAPY | Facility: CLINIC | Age: 74
End: 2024-06-18
Payer: COMMERCIAL

## 2024-06-18 DIAGNOSIS — M54.50 CHRONIC BILATERAL LOW BACK PAIN WITHOUT SCIATICA: Primary | ICD-10-CM

## 2024-06-18 DIAGNOSIS — R26.89 BALANCE PROBLEM: ICD-10-CM

## 2024-06-18 DIAGNOSIS — R29.898 WEAKNESS OF BOTH LOWER EXTREMITIES: ICD-10-CM

## 2024-06-18 DIAGNOSIS — G89.29 CHRONIC BILATERAL LOW BACK PAIN WITHOUT SCIATICA: Primary | ICD-10-CM

## 2024-06-18 PROCEDURE — 97112 NEUROMUSCULAR REEDUCATION: CPT

## 2024-06-18 NOTE — PROGRESS NOTES
"Daily Note     Today's date: 2024  Patient name: Enedina Howard  : 1950  MRN: [id number]  Referring provider: Evita Dunlap*  Dx:   Encounter Diagnosis     ICD-10-CM    1. Chronic bilateral low back pain without sciatica  M54.50     G89.29       2. Balance problem  R26.89       3. Weakness of both lower extremities  R29.898             Subjective: Pt reports that she is feeling pretty good today.      Objective: See treatment diary below      Assessment: Tolerated treatment well. Began today with stretching for low back and followed up with bike.  Pt was able to increase some strengthening with adding 1# weight to LAQ today.  She had some muscle cramping in the R calf that quickly decreased with stretching. Pt required rest breaks throughout treatment today, especially after pallof press. Added more stretching for lumbar muscles with YB.  Patient demonstrated fatigue post treatment, exhibited good technique with therapeutic exercises, and would benefit from continued PT      Plan: Continue per plan of care.       POC Expires Auth Status Start Date Expiration Date PT Visit Limit     No auth   BOMN   Date    Used 21 17 18 19 20   Remaining          Diagnosis: Low back pain   Precautions:    Primary Goals:    *asterisks by exercise = given for HEP   Manuals                                            There Ex        LTR        SKTC        DLTC        YB roll out X20 fwd/lateral  X20 forward, lat X20 forward, lat X20 forward, lat   HS stretch        Calf stretch 10x10\"                       Bike 5'  5' 5' 5'   Neuro Re-Ed        Glute squeeze        Quad sets        SLR        Clamshells        Bridge        Multifidi activation        Pelvic tilts         Seated marches 2x10  2x10 2x10 x20   Standing marches        Heel raises     x20   Toe raises     x20   Adduction 20x5\"  20x5\" 20x5\" 20x5\"   Wobble board    20x ea    HS curls        Pallof press 3x10 "       Seated glute press        Scap squeeze        AB     X20 GTB   Multifidi press   x30 x30    Standing 3 way    2x10 extenion    LAQ     2x10 1#                    Re-evaluation         Ther Act         Education                Step ups        Foam balance    3x30 sec (self selected and feet together)                     Modalities

## 2024-06-21 ENCOUNTER — APPOINTMENT (OUTPATIENT)
Dept: PHYSICAL THERAPY | Facility: CLINIC | Age: 74
End: 2024-06-21
Payer: COMMERCIAL

## 2024-06-25 ENCOUNTER — OFFICE VISIT (OUTPATIENT)
Dept: PHYSICAL THERAPY | Facility: CLINIC | Age: 74
End: 2024-06-25
Payer: COMMERCIAL

## 2024-06-25 DIAGNOSIS — G89.29 CHRONIC BILATERAL LOW BACK PAIN WITHOUT SCIATICA: Primary | ICD-10-CM

## 2024-06-25 DIAGNOSIS — R29.898 WEAKNESS OF BOTH LOWER EXTREMITIES: ICD-10-CM

## 2024-06-25 DIAGNOSIS — M54.50 CHRONIC BILATERAL LOW BACK PAIN WITHOUT SCIATICA: Primary | ICD-10-CM

## 2024-06-25 DIAGNOSIS — R26.89 BALANCE PROBLEM: ICD-10-CM

## 2024-06-25 PROCEDURE — 97110 THERAPEUTIC EXERCISES: CPT

## 2024-06-25 PROCEDURE — 97112 NEUROMUSCULAR REEDUCATION: CPT

## 2024-06-25 NOTE — PROGRESS NOTES
"Discharge     Today's date: 2024  Patient name: Enedina Howard  : 1950  MRN: [id number]  Referring provider: Evita Dunlap*  Dx:   Encounter Diagnosis     ICD-10-CM    1. Chronic bilateral low back pain without sciatica  M54.50     G89.29       2. Balance problem  R26.89       3. Weakness of both lower extremities  R29.898           Start Time: 0945  Stop Time: 1030  Total time in clinic (min): 45 minutes    Subjective: Pt reports that she is ready to work on strengthening at her home for a while.       Objective: See treatment diary below      Assessment: Enedina has been compliant with attending physical therapy and completing home exercise program since initial evaluation.  Enedina has made improvements in objective data since initial evaluation and has achieved all goals.  Patient reports having returned to their prior level of function. Patient provided with updated Home Exercise Program, all questions answered, and verbalized understanding, agreeing to plan of care. Thus it was mutually decided to discontinue this episode of care and transition to Home Exercise Program.        Plan:  Discharge to HEP      POC Expires Auth Status Start Date Expiration Date PT Visit Limit     No auth   BOMN   Date    Used 21 17 18 19 20   Remaining          Diagnosis: Low back pain   Precautions:    Primary Goals:    *asterisks by exercise = given for HEP   Manuals                                            There Ex        LTR        SKTC        DLTC        YB roll out X20 fwd/lateral  X20 forward, lat X20 forward, lat X20 forward, lat   HS stretch        Calf stretch 10x10\"                       Bike 5' 5' 5' 5' 5'   Neuro Re-Ed        Glute squeeze        Quad sets        SLR  reviewed      Clamshells        Bridge        Multifidi activation  x20      Pelvic tilts         Seated marches 2x10 x20 2x10 2x10 x20   Standing marches        Heel raises  x20   " "x20   Toe raises  x20   x20   Adduction 20x5\" 20x5\" 20x5\" 20x5\" 20x5\"   Wobble board    20x ea    HS curls        Pallof press 3x10       Seated glute press        Scap squeeze        AB     X20 GTB   Multifidi press  x20 x30 x30    Standing 3 way    2x10 extenion    LAQ  x20   2x10 1#                    Re-evaluation         Ther Act         Education                Step ups        Foam balance    3x30 sec (self selected and feet together)                     Modalities                                                                                   "

## 2024-06-28 ENCOUNTER — APPOINTMENT (OUTPATIENT)
Dept: PHYSICAL THERAPY | Facility: CLINIC | Age: 74
End: 2024-06-28
Payer: COMMERCIAL

## 2024-07-06 ENCOUNTER — HOSPITAL ENCOUNTER (OUTPATIENT)
Dept: VASCULAR ULTRASOUND | Facility: HOSPITAL | Age: 74
Discharge: HOME | End: 2024-07-06
Attending: SURGERY
Payer: COMMERCIAL

## 2024-07-06 DIAGNOSIS — I73.9 PAD (PERIPHERAL ARTERY DISEASE) (HCC): ICD-10-CM

## 2024-07-06 PROCEDURE — 93923 UPR/LXTR ART STDY 3+ LVLS: CPT

## 2024-07-06 PROCEDURE — 93925 LOWER EXTREMITY STUDY: CPT

## 2024-07-08 PROCEDURE — 93925 LOWER EXTREMITY STUDY: CPT | Performed by: SURGERY

## 2024-07-08 PROCEDURE — 93922 UPR/L XTREMITY ART 2 LEVELS: CPT | Performed by: SURGERY

## 2024-10-09 ENCOUNTER — NURSE TRIAGE (OUTPATIENT)
Age: 74
End: 2024-10-09

## 2024-10-09 DIAGNOSIS — N30.00 ACUTE CYSTITIS WITHOUT HEMATURIA: Primary | ICD-10-CM

## 2024-10-09 DIAGNOSIS — R35.0 URINARY FREQUENCY: ICD-10-CM

## 2024-10-09 DIAGNOSIS — R30.0 BURNING WITH URINATION: ICD-10-CM

## 2024-10-09 RX ORDER — CEPHALEXIN 500 MG/1
500 CAPSULE ORAL EVERY 12 HOURS SCHEDULED
Qty: 10 CAPSULE | Refills: 0 | Status: SHIPPED | OUTPATIENT
Start: 2024-10-09 | End: 2024-10-14

## 2024-10-09 NOTE — TELEPHONE ENCOUNTER
I spoke with Enedina and I offered her an appointment and she declined.  She stated that she is very familiar with the symptoms and wants to know if you will place and order for a UA C&S.  She stated that she has a lot going on and her best friend was just admitted to the hospital.  I advised her that I would send you the message.

## 2024-10-09 NOTE — TELEPHONE ENCOUNTER
Please call the patient and inform her that I have sent a urinalysis to be obtained.  I also have sent a antibiotic over to the pharmacy, but she needs to do a UA prior to starting the medication or the urinalysis will likely come up normal.  We need the urinalysis/culture to confirm that the antibiotic that I have sent is going to work well.  She might be able to get over to [facility] or one of the other labs that is later before starting medication tonight

## 2024-10-09 NOTE — TELEPHONE ENCOUNTER
"Reason for Disposition   Urinating more frequently than usual (i.e., frequency)    Answer Assessment - Initial Assessment Questions  1. SYMPTOM: \"What's the main symptom you're concerned about?\" (e.g., frequency, incontinence)      Frequency,burning while urinating    2. ONSET: \"When did the    start?\"     9/6   3. PAIN: \"Is there any pain?\" If Yes, ask: \"How bad is it?\" (Scale: 1-10; mild, moderate, severe)      8  4. CAUSE: \"What do you think is causing the symptoms?\"      Uti   5. OTHER SYMPTOMS: \"Do you have any other symptoms?\" (e.g., fever, flank pain, blood in urine, pain with urination)      Pain with urination and lower abd pressure,Patient denies flank pain ,blood in urine.Patient afebrile   Patient declined an office visit,requesting a lab order for a urine culture    Protocols used: Urinary Symptoms-ADULT-[place]    "

## 2024-11-04 ENCOUNTER — EVALUATION (OUTPATIENT)
Dept: PHYSICAL THERAPY | Facility: CLINIC | Age: 74
End: 2024-11-04
Payer: COMMERCIAL

## 2024-11-04 DIAGNOSIS — G89.29 CHRONIC LEFT SHOULDER PAIN: ICD-10-CM

## 2024-11-04 DIAGNOSIS — R53.1 GENERALIZED WEAKNESS: ICD-10-CM

## 2024-11-04 DIAGNOSIS — G89.29 CHRONIC RIGHT SHOULDER PAIN: Primary | ICD-10-CM

## 2024-11-04 DIAGNOSIS — M25.511 CHRONIC RIGHT SHOULDER PAIN: Primary | ICD-10-CM

## 2024-11-04 DIAGNOSIS — M25.512 CHRONIC LEFT SHOULDER PAIN: ICD-10-CM

## 2024-11-04 PROCEDURE — 97162 PT EVAL MOD COMPLEX 30 MIN: CPT

## 2024-11-04 NOTE — PROGRESS NOTES
PT Evaluation     Today's date: 2024  Patient name: Enedina Howard  : 1950  MRN: [id number]  Referring provider: Evita Dunlap*  Dx:   Encounter Diagnosis     ICD-10-CM    1. Chronic right shoulder pain  M25.511     G89.29       2. Chronic left shoulder pain  M25.512     G89.29       3. Generalized weakness  R53.1           Start Time: 1400  Stop Time: 1445  Total time in clinic (min): 45 minutes    Assessment  Impairments: abnormal coordination, abnormal muscle firing, abnormal muscle tone, abnormal or restricted ROM, activity intolerance, impaired physical strength, pain with function and poor body mechanics  Symptom irritability: moderate    Assessment details: Enedina Howard is a pleasant 74 y.o. female who presents with B shoulder pain and generalized weakness.  The patient's greatest concerns are worry over not knowing what's wrong, concern at no signs of improvement, fear of not being able to keep active, and future ill health (and wanting to prevent it).      No further referral appears necessary at this time based upon examination results.    Primary movement impairment diagnosis of decreased ROM resulting in pathoanatomical symptoms of decreased strength and limiting her ability to care for self, carry, dress independently, drive, exercise or recreation, get out of a chair, lift, perform household chores, perform yard work, reach overhead, shop, sleep, squat to  objects from the floor, turn to look over shoulder, and wash behind the back.    Primary Impairments:  1) Decreased ROM cervical and cervical  2) decreased postural strength    Etiologic factors include none recalled by the patient.    Understanding of Dx/Px/POC: good     Prognosis: good  Prognosis details: Positive prognostic indicators include positive attitude toward recovery and good understanding of diagnosis and treatment plan options.  Negative prognostic indicators include chronicity of symptoms, anxiety,  high symptom irritability, multiple concurrent orthopedic problems, and obesity.      Goals  Impairment Goals 4-6 weeks  - Decrease pain to 3/10  - Improve shoulder AROM to equal to the unaffected upper extremity  - Increase shoulder strength to 4+/5 throughout  - Increase scapular strength to 4+/5 throughout    Functional Goals 6-8 weeks  - Return to Prior Level of Function  - Patient will be independent with HEP  - Patient will be able to reach overhead without increased pain/compensation/difficulty  - Patient will be able to reach behind back without increased pain/compensation/difficulty   - Patient will be able to wash hair without increased pain/compensation/difficulty   - Patient will be able to lift >20 pounds with proper mechanics         Plan  Patient would benefit from: skilled physical therapy  Planned modality interventions: Modalities PRN.    Planned therapy interventions: activity modification, manual therapy, neuromuscular re-education, patient education, therapeutic activities, therapeutic exercise, graded activity, home exercise program, behavior modification and self care    Frequency: 2x week  Duration in weeks: 8  Treatment plan discussed with: patient      Subjective Evaluation    History of Present Illness  Mechanism of injury: Mechanism of injury: HOME LIFE: live alone, 1 flight (19) steps,   HOBBIES/EXERCISE: reading, watching documentaries/movies, meet with friends,   PLOF:  R knee replacement about 5 years ago, scoliosis,   HISTORY OF CURRENT INJURY:  She began with shoulder pain that came on quickly about 1 year ago.  She had an x-ray last year when it had happened.    PAIN LOCATION/DESCRIPTORS: R from shoulder to elbow, numbness in B hands,   AGGRAVATING FACTORS:  donning clothing, reaching overhead, dishes away,   EASING FACTORS: medication, heating pad, topical cream  DAY PATTERN: morning  IMAGING: bone scan, x-ray (shoulders),    Enedina denies a new onset of Bladder incontinence,  Bowel dysfunction, Dizziness, Dysphagia, Dysarthria, Drop attacks, Diplopia, Nausea, Ataxia, Recent unexplained weight loss, Clumsy or unsteadiness, Constant night pain, History of cancer, Tingling, Numbness, and Saddle anesthesia .  PATIENT GOALS: decrease pain, reach with less pain, driving without pain, decrease fatigue      Patient Goals  Patient goals for therapy: increased strength, independence with ADLs/IADLs, increased motion, improved balance and decreased pain    Pain  Current pain ratin  At best pain rating: 3  At worst pain rating: 10  Quality: radiating, knife-like, sharp, dull ache and discomfort  Relieving factors: heat, rest, support and medications  Aggravating factors: overhead activity, lifting, keyboarding and eating        Objective     Postural Observations  Seated posture: poor  Standing posture: fair      Palpation     Additional Palpation Details  TTP: UT, cervical PSMs, rhomboids, bicep,     Cervical/Thoracic Screen   Cervical range of motion within normal limits with the following exceptions: SB R: 35  SB L: 20  Flex: 30  Ext: 30   Rot R: 65  Rot L:  70    Active Range of Motion   Left Shoulder   Flexion: 120 degrees     Right Shoulder   Flexion: 102 degrees   Abduction: with pain    Passive Range of Motion     Additional Passive Range of Motion Details  Unable to formally assess due to guarding    Joint Play     Right Shoulder  Hypomobile in the anterior capsule, posterior capsule, inferior capsule and cervical spine.     Strength/Myotome Testing     Right Shoulder     Planes of Motion   Flexion: 3-   Abduction: 3-   External rotation at 0°: 3-   Internal rotation at 0°: 3-     Tests     Right Shoulder   Positive painful arc.                POC Expires    Auth Status no   Unit limit 3   Expiration date    PT/OT Limit        Diagnosis:  Shoulder   Precautions:  Fall risk   Comparable signs    Primary Impairments:    Patient Goals Reach overhead   Date        Used 1        Remaining        Manual Therapy           GHJ mobs           Cervical mobs           Thoracic mobs           PROM                      Exercise Diary              Therapeutic Exercise             Pulleys           Table slides           Wall slides           UT stretch           LS stretch                      UBE             Neuromuscular Re-education             Scap squeeze           Flx Iso           Ext Iso           AB iso           ER iso           IR iso        Rows        SAPD        No monies                       Re-Eval             Therapeutic Activities             Education                                                                    Modalities

## 2024-11-04 NOTE — LETTER
2024    Evita Dunlap MD   [employer] [facility]  [address]  [place] [place] [address]    Patient: Enedina Howard   YOB: 1950   Date of Visit: 2024     Encounter Diagnosis     ICD-10-CM    1. Chronic right shoulder pain  M25.511     G89.29       2. Chronic left shoulder pain  M25.512     G89.29       3. Generalized weakness  R53.1           Dear Dr. Dunlap:    Thank you for your recent referral of Enedina Howard. Please review the attached evaluation summary from Enedina's recent visit.     Please verify that you agree with the plan of care by signing the attached order.     If you have any questions or concerns, please do not hesitate to call.     I sincerely appreciate the opportunity to share in the care of one of your patients and hope to have another opportunity to work with you in the near future.       Sincerely,    Merced Vieira, PT      Referring Provider:      I certify that I have read the below Plan of Care and certify the need for these services furnished under this plan of treatment while under my care.                    Evita Dunlap MD   [employer] [facility]  [address]  [place] [address]  Via Fax: [phone]          PT Evaluation     Today's date: 2024  Patient name: Enedina Howard  : 1950  MRN: [id number]  Referring provider: Evita Dunlap*  Dx:   Encounter Diagnosis     ICD-10-CM    1. Chronic right shoulder pain  M25.511     G89.29       2. Chronic left shoulder pain  M25.512     G89.29       3. Generalized weakness  R53.1           Start Time: 1400  Stop Time: 1445  Total time in clinic (min): 45 minutes    Assessment  Impairments: abnormal coordination, abnormal muscle firing, abnormal muscle tone, abnormal or restricted ROM, activity intolerance, impaired physical strength, pain with function and poor body mechanics  Symptom irritability: moderate    Assessment details: Enedina Howard is a pleasant 74 y.o. female who  presents with B shoulder pain and generalized weakness.  The patient's greatest concerns are worry over not knowing what's wrong, concern at no signs of improvement, fear of not being able to keep active, and future ill health (and wanting to prevent it).      No further referral appears necessary at this time based upon examination results.    Primary movement impairment diagnosis of decreased ROM resulting in pathoanatomical symptoms of decreased strength and limiting her ability to care for self, carry, dress independently, drive, exercise or recreation, get out of a chair, lift, perform household chores, perform yard work, reach overhead, shop, sleep, squat to  objects from the floor, turn to look over shoulder, and wash behind the back.    Primary Impairments:  1) Decreased ROM cervical and cervical  2) decreased postural strength    Etiologic factors include none recalled by the patient.    Understanding of Dx/Px/POC: good     Prognosis: good  Prognosis details: Positive prognostic indicators include positive attitude toward recovery and good understanding of diagnosis and treatment plan options.  Negative prognostic indicators include chronicity of symptoms, anxiety, high symptom irritability, multiple concurrent orthopedic problems, and obesity.      Goals  Impairment Goals 4-6 weeks  - Decrease pain to 3/10  - Improve shoulder AROM to equal to the unaffected upper extremity  - Increase shoulder strength to 4+/5 throughout  - Increase scapular strength to 4+/5 throughout    Functional Goals 6-8 weeks  - Return to Prior Level of Function  - Patient will be independent with HEP  - Patient will be able to reach overhead without increased pain/compensation/difficulty  - Patient will be able to reach behind back without increased pain/compensation/difficulty   - Patient will be able to wash hair without increased pain/compensation/difficulty   - Patient will be able to lift >20 pounds with proper mechanics          Plan  Patient would benefit from: skilled physical therapy  Planned modality interventions: Modalities PRN.    Planned therapy interventions: activity modification, manual therapy, neuromuscular re-education, patient education, therapeutic activities, therapeutic exercise, graded activity, home exercise program, behavior modification and self care    Frequency: 2x week  Duration in weeks: 8  Treatment plan discussed with: patient      Subjective Evaluation    History of Present Illness  Mechanism of injury: Mechanism of injury: HOME LIFE: live alone, 1 flight (19) steps,   HOBBIES/EXERCISE: reading, watching documentaries/movies, meet with friends,   PLOF:  R knee replacement about 5 years ago, scoliosis,   HISTORY OF CURRENT INJURY:  She began with shoulder pain that came on quickly about 1 year ago.  She had an x-ray last year when it had happened.    PAIN LOCATION/DESCRIPTORS: R from shoulder to elbow, numbness in B hands,   AGGRAVATING FACTORS:  donning clothing, reaching overhead, dishes away,   EASING FACTORS: medication, heating pad, topical cream  DAY PATTERN: morning  IMAGING: bone scan, x-ray (shoulders),    Enedina denies a new onset of Bladder incontinence, Bowel dysfunction, Dizziness, Dysphagia, Dysarthria, Drop attacks, Diplopia, Nausea, Ataxia, Recent unexplained weight loss, Clumsy or unsteadiness, Constant night pain, History of cancer, Tingling, Numbness, and Saddle anesthesia .  PATIENT GOALS: decrease pain, reach with less pain, driving without pain, decrease fatigue      Patient Goals  Patient goals for therapy: increased strength, independence with ADLs/IADLs, increased motion, improved balance and decreased pain    Pain  Current pain ratin  At best pain rating: 3  At worst pain rating: 10  Quality: radiating, knife-like, sharp, dull ache and discomfort  Relieving factors: heat, rest, support and medications  Aggravating factors: overhead activity, lifting, keyboarding and  eating        Objective     Postural Observations  Seated posture: poor  Standing posture: fair      Palpation     Additional Palpation Details  TTP: UT, cervical PSMs, rhomboids, bicep,     Cervical/Thoracic Screen   Cervical range of motion within normal limits with the following exceptions: SB R: 35  SB L: 20  Flex: 30  Ext: 30   Rot R: 65  Rot L:  70    Active Range of Motion   Left Shoulder   Flexion: 120 degrees     Right Shoulder   Flexion: 102 degrees   Abduction: with pain    Passive Range of Motion     Additional Passive Range of Motion Details  Unable to formally assess due to guarding    Joint Play     Right Shoulder  Hypomobile in the anterior capsule, posterior capsule, inferior capsule and cervical spine.     Strength/Myotome Testing     Right Shoulder     Planes of Motion   Flexion: 3-   Abduction: 3-   External rotation at 0°: 3-   Internal rotation at 0°: 3-     Tests     Right Shoulder   Positive painful arc.                POC Expires 12/4   Auth Status no   Unit limit 3   Expiration date    PT/OT Limit        Diagnosis:  Shoulder   Precautions:  Fall risk   Comparable signs    Primary Impairments:    Patient Goals Reach overhead   Date 11/4       Used 1       Remaining        Manual Therapy           GHJ mobs           Cervical mobs           Thoracic mobs           PROM                      Exercise Diary              Therapeutic Exercise             Pulleys           Table slides           Wall slides           UT stretch           LS stretch                      UBE             Neuromuscular Re-education             Scap squeeze           Flx Iso           Ext Iso           AB iso           ER iso           IR iso        Rows        SAPD        No monies                       Re-Eval             Therapeutic Activities             Education                                                                    Modalities

## 2024-11-11 ENCOUNTER — TELEPHONE (OUTPATIENT)
Dept: FAMILY MEDICINE CLINIC | Facility: CLINIC | Age: 74
End: 2024-11-11

## 2024-11-11 ENCOUNTER — OFFICE VISIT (OUTPATIENT)
Dept: PHYSICAL THERAPY | Facility: CLINIC | Age: 74
End: 2024-11-11
Payer: COMMERCIAL

## 2024-11-11 DIAGNOSIS — Z83.3 FAMILY HISTORY OF DIABETES MELLITUS: Primary | ICD-10-CM

## 2024-11-11 DIAGNOSIS — Z13.1 SCREENING FOR DIABETES MELLITUS: ICD-10-CM

## 2024-11-11 DIAGNOSIS — M25.511 CHRONIC RIGHT SHOULDER PAIN: Primary | ICD-10-CM

## 2024-11-11 DIAGNOSIS — R53.1 GENERALIZED WEAKNESS: ICD-10-CM

## 2024-11-11 DIAGNOSIS — R79.89 LOW VITAMIN D LEVEL: ICD-10-CM

## 2024-11-11 DIAGNOSIS — G89.29 CHRONIC LEFT SHOULDER PAIN: ICD-10-CM

## 2024-11-11 DIAGNOSIS — G89.29 CHRONIC RIGHT SHOULDER PAIN: Primary | ICD-10-CM

## 2024-11-11 DIAGNOSIS — M25.512 CHRONIC LEFT SHOULDER PAIN: ICD-10-CM

## 2024-11-11 PROCEDURE — 97110 THERAPEUTIC EXERCISES: CPT

## 2024-11-11 PROCEDURE — 97140 MANUAL THERAPY 1/> REGIONS: CPT

## 2024-11-11 PROCEDURE — 97112 NEUROMUSCULAR REEDUCATION: CPT

## 2024-11-11 NOTE — TELEPHONE ENCOUNTER
Pt stopped in office requesting labs to be put in because she is worried she has diabetes. She stated she thinks this because of family history. I suggested making an appointment but she wants labs to be put in before making one

## 2024-11-11 NOTE — PROGRESS NOTES
Daily Note     Today's date: 2024  Patient name: Enedina Howard  : 1950  MRN: [id number]  Referring provider: Evita Dunlap*  Dx:   Encounter Diagnosis     ICD-10-CM    1. Chronic right shoulder pain  M25.511     G89.29       2. Chronic left shoulder pain  M25.512     G89.29       3. Generalized weakness  R53.1                      Subjective: Pt reports that she is very fatigued coming into treatment, her R shoulder has been hurting more than her L.       Objective: See treatment diary below      Assessment: Tolerated treatment well. Began today with bike to work on endurance for LE as well as improve blood flow prior to stretching.  Moved to yoga ball for stretching the shoulder.  Added table slides with good tolerance, though fatigue an discomfort on the R more as compared to L.  He required increased cuing to ensure propter activation during chin tucks  Patient demonstrated fatigue post treatment, exhibited good technique with therapeutic exercises, and would benefit from continued PT      Plan: Continue per plan of care.      POC Expires    Auth Status no   Unit limit 3   Expiration date    PT/OT Limit        Diagnosis:  Shoulder   Precautions:  Fall risk   Comparable signs    Primary Impairments:    Patient Goals Reach overhead   Date       Used 1 2      Remaining        Manual Therapy           GHJ mobs           Cervical mobs           Thoracic mobs           PROM                      Exercise Diary              Therapeutic Exercise             Pulleys           Table slides  X20 for, lat ea         Wall slides           UT stretch           LS stretch                      UBE             Neuromuscular Re-education             Scap squeeze  x20         Chin tucks  3x10      Flx Iso           Ext Iso           AB iso           ER iso           IR iso        Rows        SAPD        No monies                       Re-Eval             Therapeutic Activities              Education                                                                    Modalities

## 2024-11-11 NOTE — TELEPHONE ENCOUNTER
Order placed for patient's diabetes labs.  I placed a CMP which will look at fasting sugar and an A1c that looks at the 3-month average.  I cannot promise that the A1c will be covered, because she does not have an elevated fasting sugar documented, but it is the most definitive.  I added a vitamin D due to prior low values.     Please call and inform the patient of information above.     DO Peri Mckeon [facility]  11/11/2024 5:12 PM

## 2024-11-13 ENCOUNTER — OFFICE VISIT (OUTPATIENT)
Dept: PHYSICAL THERAPY | Facility: CLINIC | Age: 74
End: 2024-11-13
Payer: COMMERCIAL

## 2024-11-13 DIAGNOSIS — G89.29 CHRONIC LEFT SHOULDER PAIN: ICD-10-CM

## 2024-11-13 DIAGNOSIS — M25.512 CHRONIC LEFT SHOULDER PAIN: ICD-10-CM

## 2024-11-13 DIAGNOSIS — M25.511 CHRONIC RIGHT SHOULDER PAIN: Primary | ICD-10-CM

## 2024-11-13 DIAGNOSIS — R53.1 GENERALIZED WEAKNESS: ICD-10-CM

## 2024-11-13 DIAGNOSIS — G89.29 CHRONIC RIGHT SHOULDER PAIN: Primary | ICD-10-CM

## 2024-11-13 PROCEDURE — 97112 NEUROMUSCULAR REEDUCATION: CPT

## 2024-11-13 PROCEDURE — 97110 THERAPEUTIC EXERCISES: CPT

## 2024-11-13 NOTE — PROGRESS NOTES
Daily Note     Today's date: 2024  Patient name: Enedina Howard  : 1950  MRN: [id number]  Referring provider: Evita Dunlap*  Dx:   Encounter Diagnosis     ICD-10-CM    1. Chronic right shoulder pain  M25.511     G89.29       2. Chronic left shoulder pain  M25.512     G89.29       3. Generalized weakness  R53.1                      Subjective: Patient states she is feeling ok, no sig issues after last session.       Objective: See treatment diary below      Assessment: Cont with outlined POC as charted below. Added pulley's today to tolerance which patient noted felt good. Smaller ROM on R due to reported cyst. Added vertical towel roll to scap retraction for tactile cue to reduce UT with some carryover. Also added bilateral ER iso which she did well and exhibited improved form with scap retractors. She did report some numbness in R UE with table slides but this decreased when she performed scaption. Patient demonstrated fatigue post treatment and would benefit from continued PT      Plan: Progress treatment as tolerated.         POC Expires    Auth Status no   Unit limit 3   Expiration date    PT/OT Limit        Diagnosis:  Shoulder   Precautions:  Fall risk   Comparable signs    Primary Impairments:    Patient Goals Reach overhead   Date      Used 1 2 3     Remaining        Manual Therapy           GHJ mobs           Cervical mobs           Thoracic mobs           PROM                      Exercise Diary              Therapeutic Exercise             Pulleys    2' scaption        Table slides  X20 for, lat ea  x20 ea fwd/lat       Wall slides           UT stretch           LS stretch                      UBE             Neuromuscular Re-education             Scap squeeze  x20  with vertical roll  X20        Chin tucks  3x10 3x10      Flx Iso           Ext Iso           AB iso           ER iso           IR iso        Rows        SAPD        No monies    iso with purple  "band 5\" x20                    Re-Eval             Therapeutic Activities             Education                                                                    Modalities                                     "

## 2024-11-14 ENCOUNTER — RESULTS FOLLOW-UP (OUTPATIENT)
Dept: FAMILY MEDICINE CLINIC | Facility: CLINIC | Age: 74
End: 2024-11-14

## 2024-11-14 ENCOUNTER — APPOINTMENT (OUTPATIENT)
Dept: LAB | Facility: CLINIC | Age: 74
End: 2024-11-14
Payer: COMMERCIAL

## 2024-11-14 DIAGNOSIS — Z83.3 FAMILY HISTORY OF DIABETES MELLITUS: ICD-10-CM

## 2024-11-14 DIAGNOSIS — Z13.1 SCREENING FOR DIABETES MELLITUS: ICD-10-CM

## 2024-11-14 DIAGNOSIS — R30.0 BURNING WITH URINATION: ICD-10-CM

## 2024-11-14 DIAGNOSIS — R35.0 URINARY FREQUENCY: ICD-10-CM

## 2024-11-14 DIAGNOSIS — R79.89 LOW VITAMIN D LEVEL: ICD-10-CM

## 2024-11-14 DIAGNOSIS — N30.00 ACUTE CYSTITIS WITHOUT HEMATURIA: ICD-10-CM

## 2024-11-14 LAB
25(OH)D3 SERPL-MCNC: 22.2 NG/ML (ref 30–100)
ALBUMIN SERPL BCG-MCNC: 4.2 G/DL (ref 3.5–5)
ALP SERPL-CCNC: 66 U/L (ref 34–104)
ALT SERPL W P-5'-P-CCNC: 11 U/L (ref 7–52)
ANION GAP SERPL CALCULATED.3IONS-SCNC: 10 MMOL/L (ref 4–13)
AST SERPL W P-5'-P-CCNC: 18 U/L (ref 13–39)
BACTERIA UR QL AUTO: ABNORMAL /HPF
BILIRUB SERPL-MCNC: 0.78 MG/DL (ref 0.2–1)
BILIRUB UR QL STRIP: NEGATIVE
BUN SERPL-MCNC: 14 MG/DL (ref 5–25)
CALCIUM SERPL-MCNC: 9.7 MG/DL (ref 8.4–10.2)
CHLORIDE SERPL-SCNC: 101 MMOL/L (ref 96–108)
CLARITY UR: CLEAR
CO2 SERPL-SCNC: 28 MMOL/L (ref 21–32)
COLOR UR: YELLOW
CREAT SERPL-MCNC: 0.66 MG/DL (ref 0.6–1.3)
EST. AVERAGE GLUCOSE BLD GHB EST-MCNC: 111 MG/DL
GFR SERPL CREATININE-BSD FRML MDRD: 87 ML/MIN/1.73SQ M
GLUCOSE P FAST SERPL-MCNC: 90 MG/DL (ref 65–99)
GLUCOSE UR STRIP-MCNC: NEGATIVE MG/DL
GRAN CASTS #/AREA URNS LPF: ABNORMAL /[LPF]
HBA1C MFR BLD: 5.5 %
HGB UR QL STRIP.AUTO: NEGATIVE
HYALINE CASTS #/AREA URNS LPF: ABNORMAL /LPF
KETONES UR STRIP-MCNC: NEGATIVE MG/DL
LEUKOCYTE ESTERASE UR QL STRIP: ABNORMAL
MAGNESIUM SERPL-MCNC: 1.9 MG/DL (ref 1.9–2.7)
NITRITE UR QL STRIP: NEGATIVE
NON-SQ EPI CELLS URNS QL MICRO: ABNORMAL /HPF
PH UR STRIP.AUTO: 6.5 [PH]
POTASSIUM SERPL-SCNC: 4.3 MMOL/L (ref 3.5–5.3)
PROT SERPL-MCNC: 7.6 G/DL (ref 6.4–8.4)
PROT UR STRIP-MCNC: ABNORMAL MG/DL
RBC #/AREA URNS AUTO: ABNORMAL /HPF
SODIUM SERPL-SCNC: 139 MMOL/L (ref 135–147)
SP GR UR STRIP.AUTO: 1.02 (ref 1–1.03)
UROBILINOGEN UR STRIP-ACNC: <2 MG/DL
WBC #/AREA URNS AUTO: ABNORMAL /HPF

## 2024-11-14 PROCEDURE — 87086 URINE CULTURE/COLONY COUNT: CPT

## 2024-11-14 PROCEDURE — 82306 VITAMIN D 25 HYDROXY: CPT

## 2024-11-14 PROCEDURE — 36415 COLL VENOUS BLD VENIPUNCTURE: CPT

## 2024-11-14 PROCEDURE — 83735 ASSAY OF MAGNESIUM: CPT

## 2024-11-14 PROCEDURE — 83036 HEMOGLOBIN GLYCOSYLATED A1C: CPT

## 2024-11-14 PROCEDURE — 81001 URINALYSIS AUTO W/SCOPE: CPT

## 2024-11-14 PROCEDURE — 80053 COMPREHEN METABOLIC PANEL: CPT

## 2024-11-14 NOTE — RESULT ENCOUNTER NOTE
Please paul and inform the patient that her most recent labs including her kidneys, liver, electrolytes, sugars, and magnesium are normal.  If she has any other issues or concerns I would recommend returning for follow up in the office.     Sincerely,     Dr. Marx DO

## 2024-11-15 LAB — BACTERIA UR CULT: NORMAL

## 2024-11-17 ENCOUNTER — RESULTS FOLLOW-UP (OUTPATIENT)
Dept: FAMILY MEDICINE CLINIC | Facility: CLINIC | Age: 74
End: 2024-11-17

## 2024-11-17 DIAGNOSIS — R79.89 LOW VITAMIN D LEVEL: Primary | ICD-10-CM

## 2024-11-17 NOTE — PROGRESS NOTES
Please call Mrs. Howard, and inform her that her electrolytes, kidney function, liver function, glucose, A1c which is a evaluation of her sugars over the last 3 months were within normal limits.  She does have evidence of continued low vitamin D.  I will be sending over a supplement to the pharmacy for her, 1000 units daily.  She should discontinue the 50,000 unit dosage if she is still taking it.      Lastly her urine was obtained, it showed a mild amount of inflammation, but no specific UTI.  If she is still having urinary symptoms, we may want to consider following up or repeating the urine culture.  Please let me know when you give her a call.         Dr. Marx DO

## 2024-11-18 DIAGNOSIS — R35.0 URINARY FREQUENCY: ICD-10-CM

## 2024-11-18 DIAGNOSIS — R30.0 BURNING WITH URINATION: ICD-10-CM

## 2024-11-18 DIAGNOSIS — N30.00 ACUTE CYSTITIS WITHOUT HEMATURIA: Primary | ICD-10-CM

## 2024-11-18 NOTE — PROGRESS NOTES
She will need a follow up urine culture.  She will need to go again and have it done.  Last one was negative for UTI recently and I need to ensure proper antibiotic if it comes up positive on Follow up.  I have placed orders.  Patient is to obtain the urine sample then I will send an empiric antibiotic.     DO Peri [facility]  11/18/2024 1:19 PM

## 2024-11-20 ENCOUNTER — OFFICE VISIT (OUTPATIENT)
Dept: PHYSICAL THERAPY | Facility: CLINIC | Age: 74
End: 2024-11-20
Payer: COMMERCIAL

## 2024-11-20 ENCOUNTER — APPOINTMENT (OUTPATIENT)
Dept: LAB | Facility: CLINIC | Age: 74
End: 2024-11-20
Payer: COMMERCIAL

## 2024-11-20 ENCOUNTER — OFFICE VISIT (OUTPATIENT)
Dept: FAMILY MEDICINE CLINIC | Facility: CLINIC | Age: 74
End: 2024-11-20
Payer: COMMERCIAL

## 2024-11-20 VITALS
HEIGHT: 61 IN | HEART RATE: 82 BPM | OXYGEN SATURATION: 98 % | SYSTOLIC BLOOD PRESSURE: 120 MMHG | BODY MASS INDEX: 38.55 KG/M2 | DIASTOLIC BLOOD PRESSURE: 80 MMHG

## 2024-11-20 DIAGNOSIS — M79.7 FIBROMYALGIA: ICD-10-CM

## 2024-11-20 DIAGNOSIS — R79.9 ABNORMAL FINDING OF BLOOD CHEMISTRY, UNSPECIFIED: ICD-10-CM

## 2024-11-20 DIAGNOSIS — R20.2 NUMBNESS AND TINGLING OF BOTH FEET: ICD-10-CM

## 2024-11-20 DIAGNOSIS — R39.15 URINARY URGENCY: ICD-10-CM

## 2024-11-20 DIAGNOSIS — M25.512 CHRONIC LEFT SHOULDER PAIN: ICD-10-CM

## 2024-11-20 DIAGNOSIS — G89.29 CHRONIC LEFT SHOULDER PAIN: ICD-10-CM

## 2024-11-20 DIAGNOSIS — G47.19 EXCESSIVE DAYTIME SLEEPINESS: ICD-10-CM

## 2024-11-20 DIAGNOSIS — R20.0 NUMBNESS AND TINGLING IN BOTH HANDS: ICD-10-CM

## 2024-11-20 DIAGNOSIS — R53.82 CHRONIC FATIGUE: Primary | ICD-10-CM

## 2024-11-20 DIAGNOSIS — M25.511 CHRONIC RIGHT SHOULDER PAIN: Primary | ICD-10-CM

## 2024-11-20 DIAGNOSIS — R20.2 NUMBNESS AND TINGLING IN BOTH HANDS: ICD-10-CM

## 2024-11-20 DIAGNOSIS — K21.9 GASTROESOPHAGEAL REFLUX DISEASE, UNSPECIFIED WHETHER ESOPHAGITIS PRESENT: ICD-10-CM

## 2024-11-20 DIAGNOSIS — S50.812D: ICD-10-CM

## 2024-11-20 DIAGNOSIS — R53.1 GENERALIZED WEAKNESS: ICD-10-CM
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
## 2024-11-20 DIAGNOSIS — R53.82 CHRONIC FATIGUE: ICD-10-CM

## 2024-11-20 DIAGNOSIS — G89.29 CHRONIC RIGHT SHOULDER PAIN: Primary | ICD-10-CM

## 2024-11-20 DIAGNOSIS — R20.0 NUMBNESS AND TINGLING OF BOTH FEET: ICD-10-CM

## 2024-11-20 DIAGNOSIS — R35.0 URINARY FREQUENCY: ICD-10-CM

## 2024-11-20 LAB
BASOPHILS # BLD AUTO: 0.07 THOUSANDS/ÂΜL (ref 0–0.1)
BASOPHILS NFR BLD AUTO: 1 % (ref 0–1)
EOSINOPHIL # BLD AUTO: 0.38 THOUSAND/ÂΜL (ref 0–0.61)
EOSINOPHIL NFR BLD AUTO: 4 % (ref 0–6)
ERYTHROCYTE [DISTWIDTH] IN BLOOD BY AUTOMATED COUNT: 13.7 % (ref 11.6–15.1)
FERRITIN SERPL-MCNC: 44 NG/ML (ref 11–307)
FOLATE SERPL-MCNC: 8.6 NG/ML
HCT VFR BLD AUTO: 41.7 % (ref 34.8–46.1)
HGB BLD-MCNC: 13.3 G/DL (ref 11.5–15.4)
IMM GRANULOCYTES # BLD AUTO: 0.01 THOUSAND/UL (ref 0–0.2)
IMM GRANULOCYTES NFR BLD AUTO: 0 % (ref 0–2)
IRON SATN MFR SERPL: 21 % (ref 15–50)
IRON SERPL-MCNC: 75 UG/DL (ref 50–212)
LYMPHOCYTES # BLD AUTO: 2.26 THOUSANDS/ÂΜL (ref 0.6–4.47)
LYMPHOCYTES NFR BLD AUTO: 25 % (ref 14–44)
MCH RBC QN AUTO: 29.2 PG (ref 26.8–34.3)
MCHC RBC AUTO-ENTMCNC: 31.9 G/DL (ref 31.4–37.4)
MCV RBC AUTO: 92 FL (ref 82–98)
MONOCYTES # BLD AUTO: 0.51 THOUSAND/ÂΜL (ref 0.17–1.22)
MONOCYTES NFR BLD AUTO: 6 % (ref 4–12)
NEUTROPHILS # BLD AUTO: 5.96 THOUSANDS/ÂΜL (ref 1.85–7.62)
NEUTS SEG NFR BLD AUTO: 64 % (ref 43–75)
NRBC BLD AUTO-RTO: 0 /100 WBCS
PLATELET # BLD AUTO: 382 THOUSANDS/UL (ref 149–390)
PMV BLD AUTO: 9.1 FL (ref 8.9–12.7)
RBC # BLD AUTO: 4.55 MILLION/UL (ref 3.81–5.12)
TIBC SERPL-MCNC: 353 UG/DL (ref 250–450)
UIBC SERPL-MCNC: 278 UG/DL (ref 155–355)
WBC # BLD AUTO: 9.19 THOUSAND/UL (ref 4.31–10.16)

## 2024-11-20 PROCEDURE — G2211 COMPLEX E/M VISIT ADD ON: HCPCS | Performed by: FAMILY MEDICINE

## 2024-11-20 PROCEDURE — 83550 IRON BINDING TEST: CPT

## 2024-11-20 PROCEDURE — 82728 ASSAY OF FERRITIN: CPT

## 2024-11-20 PROCEDURE — 85025 COMPLETE CBC W/AUTO DIFF WBC: CPT

## 2024-11-20 PROCEDURE — 97110 THERAPEUTIC EXERCISES: CPT

## 2024-11-20 PROCEDURE — 83918 ORGANIC ACIDS TOTAL QUANT: CPT

## 2024-11-20 PROCEDURE — 83540 ASSAY OF IRON: CPT

## 2024-11-20 PROCEDURE — 99214 OFFICE O/P EST MOD 30 MIN: CPT | Performed by: FAMILY MEDICINE

## 2024-11-20 PROCEDURE — 97112 NEUROMUSCULAR REEDUCATION: CPT

## 2024-11-20 PROCEDURE — 36415 COLL VENOUS BLD VENIPUNCTURE: CPT

## 2024-11-20 PROCEDURE — 82746 ASSAY OF FOLIC ACID SERUM: CPT

## 2024-11-20 RX ORDER — MUPIROCIN 20 MG/G
OINTMENT TOPICAL 3 TIMES DAILY
Qty: 30 G | Refills: 0 | Status: SHIPPED | OUTPATIENT
Start: 2024-11-20

## 2024-11-20 RX ORDER — ESOMEPRAZOLE MAGNESIUM 40 MG/1
40 CAPSULE, DELAYED RELEASE ORAL
Qty: 90 CAPSULE | Refills: 1 | Status: SHIPPED | OUTPATIENT
Start: 2024-11-20 | End: 2025-05-19

## 2024-11-20 RX ORDER — DULOXETIN HYDROCHLORIDE 20 MG/1
20 CAPSULE, DELAYED RELEASE ORAL EVERY OTHER DAY
Qty: 90 CAPSULE | Refills: 1 | Status: SHIPPED | OUTPATIENT
Start: 2024-11-20

## 2024-11-20 RX ORDER — CEPHALEXIN 500 MG/1
500 CAPSULE ORAL EVERY 12 HOURS SCHEDULED
Qty: 10 CAPSULE | Refills: 0 | Status: SHIPPED | OUTPATIENT
Start: 2024-11-20 | End: 2024-11-25

## 2024-11-20 NOTE — PROGRESS NOTES
Outpatient Note- Follow up     HPI:     Enedina Howard , 74 y.o. female  presents today for follow-up of excessive fatigue.  The patient has been experiencing tiredness, fatigue, and overall feeling wiped out.  Patient does not understand why she is feeling so tired.  She does have chronic history of COPD, IBS, GERD, PAD/vascular issues, DJD, scoliosis, and fibromyalgia.  The patient states that she does not understand why she is so tired.  We recently obtained labs that looked at her electrolytes, kidney function, liver function, A1c, inflammatory markers all of which were within normal limits.  Within the last 6 months she has had other labs looking at her blood counts and thyroid, both of which were normal she has been having the symptoms for over a year now.    The patient's symptoms include excessive daytime tiredness, fatigue, feeling washed out, increased urination overnight, always thirsty, numbness and tingling in hands and feet, and frequent UTIs.  We spent the majority of the time going through different causes for each of these issues including interstitial cystitis, GONZALO, diabetes, possible adrenal issues, radicular pain.     Past Medical History:   Diagnosis Date    Abnormal ECG 2010 ?    Arthritis     Asthma     Chronic pain disorder     Colon polyp     COPD (chronic obstructive pulmonary disease) (HCC)     Coronary artery disease     Fibromyalgia, primary     GERD (gastroesophageal reflux disease)     High blood pressure     History of transfusion     Hyperlipidemia     Hypertension     Inflammatory bowel disease 2002    Irritable bowel syndrome     Kidney stone     Myocardial infarction (HCC) ?    Silent heart attack    Pneumonia 2023    PONV (postoperative nausea and vomiting)     Urinary tract infection Past 4 years    Frequent    Varicella     Visual impairment 2023    Cataracts, dry, macular degeneration      ROS:   Review of Systems   See HPI    OBJECTIVE  Vitals:    11/20/24 1216   BP: 120/80    Pulse: 82   SpO2: 98%        Physical Exam   Minimal PE performed.    Patient has full sensation to the bottom of her right foot.    She has mild changes to her ankle, but overall no significant pitting edema.    Pulses are good despite history of stenting    ASSESSMENT AND PLAN   Diagnoses and all orders for this visit:    Chronic fatigue  Excessive daytime sleepiness  Numbness and tingling in both hands  Numbness and tingling of both feet  Abnormal finding of blood chemistry, unspecified  Patient presents today with continued excessive fatigue, daytime tiredness, numbness and tingling, and overall malaise.  Unknown etiology of her symptoms.  I reviewed the most recent labs on 11/14/2024.  She has no evidence of diabetes, her A1c is 5.5, her electrolytes, kidney function, liver function, fasting sugar, urine culture was negative.  Since I did not place orders for an iron panel, CBC, B12 or folate, will obtain these to look into causes for numbness and tingling.  There could be B12/folate deficiency, low blood counts, and decreased iron which could be causing some restless leg or altered sensation.  We will obtain these and she will discuss with her family member whom is a physician about any other diagnosis that I may not have thought of.  I offered sleep study, patient declined.  Previously evaluated and refused follow up sleep study.   -     Iron Panel (Includes Ferritin, Iron Sat%, Iron, and TIBC); Future  -     CBC and differential; Future  -     Methylmalonic acid, serum; Future  -     Folate; Future  -     DULoxetine (CYMBALTA) 20 mg capsule; Take 1 capsule (20 mg total) by mouth every other day    Urinary frequency  Urinary urgency  Patient has history of frequent UTIs.  She feels that she has 1 now although she recently had a urinalysis and UA that was grossly normal.  She feels that a antibiotic will help relieve some of the symptoms and pain she is having.  Keflex was previously used and was helpful.   Orders placed.  Patient has standing order for UA.  -     cephalexin (KEFLEX) 500 mg capsule; Take 1 capsule (500 mg total) by mouth every 12 (twelve) hours for 5 days  -     UA w Reflex to Microscopic w Reflex to Culture; Standing    Gastroesophageal reflux disease, unspecified whether esophagitis present  Patient has a history of GERD.  She is currently taking Nexium 40 mg daily.  This helps with quality of life and symptom control.  Since she is on this frequently, will obtain iron studies, vitamin D, calcium, and magnesium was already checked.  -     esomeprazole (NexIUM) 40 MG capsule; Take 1 capsule (40 mg total) by mouth daily in the early morning    Fibromyalgia  Patient has history of fibromyalgia.  She was previously given Cymbalta 20 mg.  If she does not take that she gets electric-like shocks.  She needs refills of her medication.  -     DULoxetine (CYMBALTA) 20 mg capsule; Take 1 capsule (20 mg total) by mouth every other day    Cat scratch of left forearm, subsequent encounter  Patient presents today with several cat scratches.  She would likely benefit from a topical ointment to see if it improves healing.  The patient does have thinner skin, and multiple scratches from her cat, I recommended that she try to avoid this the best she can or wear clothing that might help to reduce the scratches in general  -     mupirocin (BACTROBAN) 2 % ointment; Apply topically 3 (three) times a day    DO Peri Mckeon [facility]  11/20/2024 1:20 PM

## 2024-11-20 NOTE — PROGRESS NOTES
Outpatient Note- Follow up     HPI:     Enedina Howard , 74 y.o. female  presents today for ***        Past Medical History:   Diagnosis Date   • Abnormal ECG 2010 ?   • Arthritis    • Asthma    • Chronic pain disorder    • Colon polyp    • COPD (chronic obstructive pulmonary disease) (HCC)    • Coronary artery disease    • Fibromyalgia, primary    • GERD (gastroesophageal reflux disease)    • High blood pressure    • History of transfusion    • Hyperlipidemia    • Hypertension    • Inflammatory bowel disease 2002   • Irritable bowel syndrome    • Kidney stone    • Myocardial infarction (HCC) ?    Silent heart attack   • Pneumonia 2023   • PONV (postoperative nausea and vomiting)    • Urinary tract infection Past 4 years    Frequent   • Varicella    • Visual impairment 2023    Cataracts, dry, macular degeneration          ROS:     Review of Systems       OBJECTIVE  Vitals:    11/20/24 1216   BP: 120/80   Pulse: 82   SpO2: 98%        Physical Exam       ASSESSMENT AND PLAN   There are no diagnoses linked to this encounter.         DO Peri Mckeon [facility]  11/20/2024 1:00 PM

## 2024-11-20 NOTE — PROGRESS NOTES
"Daily Note     Today's date: 2024  Patient name: Enedina Howard  : 1950  MRN: [id number]  Referring provider: Evita Dunlap*  Dx:   Encounter Diagnosis     ICD-10-CM    1. Chronic right shoulder pain  M25.511     G89.29       2. Chronic left shoulder pain  M25.512     G89.29       3. Generalized weakness  R53.1                      Subjective: Pt reports that she isn't feeling the best today, reporting that her whole body doesn't feel good.       Objective: See treatment diary below      Assessment: Tolerated treatment well. Began today with pulleys, though pt required some breaks throughout due to discomfort.  Moved to table slides, taking time to perform 2 sets.  Able to add some isometric strengthening with some more challenge noted into ER as compared to IR.  Patient demonstrated fatigue post treatment, exhibited good technique with therapeutic exercises, and would benefit from continued PT      Plan: Continue per plan of care.        POC Expires    Auth Status no   Unit limit 3   Expiration date    PT/OT Limit        Diagnosis:  Shoulder   Precautions:  Fall risk   Comparable signs    Primary Impairments:    Patient Goals Reach overhead   Date     Used 1 2 3 4    Remaining        Manual Therapy           GHJ mobs           Cervical mobs           Thoracic mobs           PROM                      Exercise Diary              Therapeutic Exercise             Pulleys    2' scaption   2'/2'     Table slides  X20 for, lat ea  x20 ea fwd/lat  2x20 ea fwd/lat     Wall slides           UT stretch           LS stretch                      UBE             Neuromuscular Re-education             Scap squeeze  x20  with vertical roll  X20        Chin tucks  3x10 3x10      Flx Iso           Ext Iso           AB iso           ER iso      20x5\" PTB     IR iso    20x5\"     Rows        SAPD        No monies    iso with purple band 5\" x20                    Re-Eval           "   Therapeutic Activities             Education                                                                    Modalities

## 2024-11-21 ENCOUNTER — RESULTS FOLLOW-UP (OUTPATIENT)
Dept: FAMILY MEDICINE CLINIC | Facility: CLINIC | Age: 74
End: 2024-11-21

## 2024-11-21 NOTE — TELEPHONE ENCOUNTER
----- Message from Alin Marx DO sent at 11/21/2024  2:30 PM EST -----  Please call Mrs. Howard and inform her that I have reviewed her follow up labs and they are normal.      I would like her to consider another sleep study at home to see if this is the issue. Also if she has any other changes then she needs to come back into the office.     Thanks,     Dr. Marx DO

## 2024-11-21 NOTE — RESULT ENCOUNTER NOTE
Please call Mrs. Howard and inform her that I have reviewed her follow up labs and they are normal.      I would like her to consider another sleep study at home to see if this is the issue. Also if she has any other changes then she needs to come back into the office.     Thanks,     Dr. Marx DO

## 2024-11-22 ENCOUNTER — APPOINTMENT (OUTPATIENT)
Dept: PHYSICAL THERAPY | Facility: CLINIC | Age: 74
End: 2024-11-22
Payer: COMMERCIAL

## 2024-11-23 LAB — METHYLMALONATE SERPL-SCNC: 164 NMOL/L (ref 0–378)

## 2024-11-24 DIAGNOSIS — R79.89 LOW VITAMIN D LEVEL: ICD-10-CM

## 2024-11-26 RX ORDER — CHOLECALCIFEROL (VITAMIN D3) 25 MCG
1 TABLET ORAL DAILY
Qty: 90 TABLET | Refills: 1 | Status: SHIPPED | OUTPATIENT
Start: 2024-11-26

## 2024-11-27 ENCOUNTER — OFFICE VISIT (OUTPATIENT)
Dept: PHYSICAL THERAPY | Facility: CLINIC | Age: 74
End: 2024-11-27
Payer: COMMERCIAL

## 2024-11-27 DIAGNOSIS — G89.29 CHRONIC LEFT SHOULDER PAIN: ICD-10-CM

## 2024-11-27 DIAGNOSIS — R53.1 GENERALIZED WEAKNESS: ICD-10-CM

## 2024-11-27 DIAGNOSIS — G89.29 CHRONIC RIGHT SHOULDER PAIN: Primary | ICD-10-CM

## 2024-11-27 DIAGNOSIS — M25.512 CHRONIC LEFT SHOULDER PAIN: ICD-10-CM

## 2024-11-27 DIAGNOSIS — M25.511 CHRONIC RIGHT SHOULDER PAIN: Primary | ICD-10-CM

## 2024-11-27 PROCEDURE — 97112 NEUROMUSCULAR REEDUCATION: CPT

## 2024-11-27 PROCEDURE — 97110 THERAPEUTIC EXERCISES: CPT

## 2024-11-27 NOTE — PROGRESS NOTES
"Daily Note     Today's date: 2024  Patient name: Enedina Howard  : 1950  MRN: [id number]  Referring provider: Evita Dunlap*  Dx:   Encounter Diagnosis     ICD-10-CM    1. Chronic right shoulder pain  M25.511     G89.29       2. Chronic left shoulder pain  M25.512     G89.29       3. Generalized weakness  R53.1                      Subjective: Patient reports that she is a little more sore in the shoulders today.       Objective: See treatment diary below      Assessment: Tolerated treatment well. Began today with pulleys to decrease tightness with pt having some discomfort at end range originally.  Moved to ball rolls with no increase in pain noted.  She was able to get into some strengthening activities with external and internal rotation.  She ended treatment with decreased pain.  Patient demonstrated fatigue post treatment, exhibited good technique with therapeutic exercises, and would benefit from continued PT      Plan: Continue per plan of care.        POC Expires    Auth Status no   Unit limit 3   Expiration date    PT/OT Limit        Diagnosis:  Shoulder   Precautions:  Fall risk   Comparable signs    Primary Impairments:    Patient Goals Reach overhead   Date    Used 1 2 3 4    Remaining        Manual Therapy           GHJ mobs           Cervical mobs           Thoracic mobs           PROM                      Exercise Diary              Therapeutic Exercise             Pulleys    2' scaption   2'/2'  2'/2'   Table slides  X20 for, lat ea  x20 ea fwd/lat  2x20 ea fwd/lat  2x20 ea fwd/lat   Wall slides           UT stretch           LS stretch                      UBE             Neuromuscular Re-education             Scap squeeze  x20  with vertical roll  X20     x20   Chin tucks  3x10 3x10   2x10   Flx Iso        2x10   Ext Iso        2x10   AB iso           ER iso      20x5\" PTB  2x10   IR iso    20x5\"     Rows        SAPD        No monies    iso " "with purple band 5\" x20     2x10 YTB               Re-Eval             Therapeutic Activities             Education                                                                    Modalities                                         "

## 2024-12-02 ENCOUNTER — APPOINTMENT (OUTPATIENT)
Dept: RADIOLOGY | Facility: AMBULARY SURGERY CENTER | Age: 74
End: 2024-12-02
Attending: STUDENT IN AN ORGANIZED HEALTH CARE EDUCATION/TRAINING PROGRAM
Payer: COMMERCIAL

## 2024-12-02 ENCOUNTER — OFFICE VISIT (OUTPATIENT)
Dept: OBGYN CLINIC | Facility: CLINIC | Age: 74
End: 2024-12-02
Payer: COMMERCIAL

## 2024-12-02 ENCOUNTER — APPOINTMENT (OUTPATIENT)
Dept: PHYSICAL THERAPY | Facility: CLINIC | Age: 74
End: 2024-12-02
Payer: COMMERCIAL

## 2024-12-02 VITALS
HEIGHT: 61 IN | SYSTOLIC BLOOD PRESSURE: 126 MMHG | WEIGHT: 191 LBS | HEART RATE: 85 BPM | DIASTOLIC BLOOD PRESSURE: 81 MMHG | BODY MASS INDEX: 36.06 KG/M2

## 2024-12-02 DIAGNOSIS — M25.511 RIGHT SHOULDER PAIN, UNSPECIFIED CHRONICITY: ICD-10-CM

## 2024-12-02 DIAGNOSIS — M54.12 RADICULOPATHY, CERVICAL REGION: ICD-10-CM

## 2024-12-02 DIAGNOSIS — M19.011 PRIMARY OSTEOARTHRITIS OF RIGHT SHOULDER: Primary | ICD-10-CM

## 2024-12-02 DIAGNOSIS — G89.29 CHRONIC RIGHT SHOULDER PAIN: ICD-10-CM

## 2024-12-02 DIAGNOSIS — M25.511 CHRONIC RIGHT SHOULDER PAIN: ICD-10-CM

## 2024-12-02 PROCEDURE — 99204 OFFICE O/P NEW MOD 45 MIN: CPT | Performed by: STUDENT IN AN ORGANIZED HEALTH CARE EDUCATION/TRAINING PROGRAM

## 2024-12-02 PROCEDURE — 73030 X-RAY EXAM OF SHOULDER: CPT

## 2024-12-02 PROCEDURE — 20610 DRAIN/INJ JOINT/BURSA W/O US: CPT | Performed by: STUDENT IN AN ORGANIZED HEALTH CARE EDUCATION/TRAINING PROGRAM

## 2024-12-02 RX ORDER — TRIAMCINOLONE ACETONIDE 40 MG/ML
40 INJECTION, SUSPENSION INTRA-ARTICULAR; INTRAMUSCULAR
Status: COMPLETED | OUTPATIENT
Start: 2024-12-02 | End: 2024-12-02

## 2024-12-02 RX ORDER — BUPIVACAINE HYDROCHLORIDE 2.5 MG/ML
4 INJECTION, SOLUTION INFILTRATION; PERINEURAL
Status: COMPLETED | OUTPATIENT
Start: 2024-12-02 | End: 2024-12-02

## 2024-12-02 RX ADMIN — BUPIVACAINE HYDROCHLORIDE 4 ML: 2.5 INJECTION, SOLUTION INFILTRATION; PERINEURAL at 10:00

## 2024-12-02 RX ADMIN — TRIAMCINOLONE ACETONIDE 40 MG: 40 INJECTION, SUSPENSION INTRA-ARTICULAR; INTRAMUSCULAR at 10:00

## 2024-12-02 NOTE — PROGRESS NOTES
Ortho Sports Medicine Shoulder New Patient Visit     Assesment:   74 y.o. female right shoulder primary osteoarthritis.    Plan:    Enedina is a pleasant 74-year-old female who presents to clinic today for initial evaluation of right shoulder pain.  After review of her history and imaging, as well as a thorough physical exam, I believe her pain is due to moderate osteoarthritic changes of her glenohumeral joint, along with cervical radiculopathy as result of her degenerative joint disease of her cervical spine.  At this time, I offered her a cortisone injection for her glenohumeral joint with pain management.  After discussing the risks and benefits of these injections, Enedina agreed to the injection.  She tolerated the injection well with no immediate complications. Given that this pain could also be the result of her degenerative joint disease of her cervical spine, I placed a referral to my colleague Dr. Austin Tinajero in pain management to discuss other treatment options.  All the patient's questions and concerns were addressed at today's visit at length. She will follow-up with me as needed.    Conservative treatment:    Ice to shoulder 1-2 times daily, for 20 minutes at a time.  PT for ROM and strengthening to shoulder, rotator cuff, scapular stabilizers.  Home exercise program for shoulder, including ROM and strenthening.  Instructions given to patient of what exercises to perform.  Let pain guide return to activities.      Imaging:    All imaging from today was reviewed by myself and explained to the patient.       Injection:    The risks and benefits of the injection (which include but are not limited to: infection, bleeding,damage to nerve/artery, need for further intervention), as well as the risks and benefits of all alternative treatments were explained and understood.  The patient elected to proceed with injection. The procedure was done with aseptic technique, and the patient tolerated the procedure well  "with no complications.  A corticosteroid injection of the glenohumeral joint was performed.  The patient should take 1-2 days off of activity, with gradual return to activity as tolerated.  Ice to the shoulder 1-2 times daily for 20 minutes, for next 24-48 hrs.    Large joint arthrocentesis: R glenohumeral  Universal Protocol:  procedure performed by consultantConsent: Verbal consent obtained.  Risks and benefits: risks, benefits and alternatives were discussed  Consent given by: patient  Time out: Immediately prior to procedure a \"time out\" was called to verify the correct patient, procedure, equipment, support staff and site/side marked as required.  Timeout called at: 12/2/2024 10:33 AM.  Patient understanding: patient states understanding of the procedure being performed  Patient consent: the patient's understanding of the procedure matches consent given  Site marked: the operative site was marked  Patient identity confirmed: verbally with patient  Supporting Documentation  Indications: pain and diagnostic evaluation   Procedure Details  Location: shoulder - R glenohumeral  Preparation: Patient was prepped and draped in the usual sterile fashion  Needle size: 22 G  Ultrasound guidance: no  Approach: posterior  Medications administered: 4 mL bupivacaine 0.25 %; 40 mg triamcinolone acetonide 40 mg/mL    Patient tolerance: patient tolerated the procedure well with no immediate complications           Surgery:     No surgery is recommended at this point, continue with conservative treatment plan as noted.      Follow up:    Return if symptoms worsen or fail to improve.        Chief Complaint   Patient presents with    Right Shoulder - Pain       History of Present Illness:    The patient is a 74 y.o., right hand dominant female whose occupation is retired, referred to me by themself, seen in clinic for evaluation of right shoulder pain.  She states pains been going on since September 2023 with no specific mechanism " of injury, however she does state that she has had previous falls in the past.  Describes the pain as a sharp stabbing pain that can be a 10 out of 10.  She states that the pain is intermittent, but most commonly brought on by driving.  She states she is also getting treated for degenerative joint disease of her cervical spine and was prescribed gabapentin which she states provides minimal relief.  She also states that she has been in physical therapy with minimal relief.  She denies any numbness tingling at this time.    Shoulder Surgical History:  None    Past Medical, Social and Family History:  Past Medical History:   Diagnosis Date    Abnormal ECG 2010 ?    Arthritis     Asthma     Chronic pain disorder     Colon polyp     COPD (chronic obstructive pulmonary disease) (HCC)     Coronary artery disease     Fibromyalgia, primary     GERD (gastroesophageal reflux disease)     High blood pressure     History of transfusion     Hyperlipidemia     Hypertension     Inflammatory bowel disease 2002    Irritable bowel syndrome     Kidney stone     Myocardial infarction (HCC) ?    Silent heart attack    Pneumonia 2023    PONV (postoperative nausea and vomiting)     Urinary tract infection Past 4 years    Frequent    Varicella     Visual impairment 2023    Cataracts, dry, macular degeneration     Past Surgical History:   Procedure Laterality Date    BARIATRIC SURGERY      COLONOSCOPY      GASTROSTOMY      HYSTERECTOMY Bilateral     complete  age 55    IR IVC FILTER REMOVAL  12/07/2015    IR LOWER EXTREMITY ANGIOGRAM  08/15/2022    JOINT REPLACEMENT      bilateral knee    LAPAROSCOPIC GASTRIC BANDING      POPLITEAL ARTERY STENT      2016    DC SLCTV CATHJ 3RD+ ORD SLCTV ABDL PEL/LXTR BRNCH Right 08/15/2022    Procedure: Aortogram RLE runoff L CFA access w/ 6F sheath and mynx closure R popliteal PTA w/ 4x60mm  balloon;  Surgeon: Nicky Shin MD;  Location: [facility];  Service: Vascular    REPLACEMENT TOTAL KNEE       2016    TONSILLECTOMY      US GUIDED VASCULAR ACCESS  12/07/2015     Allergies   Allergen Reactions    Erythromycin Nausea Only     Nausea      Levofloxacin     Codeine Other (See Comments)     RINGING IN EARS AND DIFFICULTY SLEEPING    Trospium Other (See Comments)     Dry mouth     Current Outpatient Medications on File Prior to Visit   Medication Sig Dispense Refill    aspirin (ECOTRIN LOW STRENGTH) 81 mg EC tablet Take 81 mg by mouth daily      Aspirin-Acetaminophen-Caffeine (EXCEDRIN PO) Take by mouth daily      Azelastine HCl 137 MCG/SPRAY SOLN 1 SPRAY INTO EACH NOSTRIL 2 (TWO) TIMES A DAY USE IN EACH NOSTRIL AS DIRECTED 30 mL 1    cholestyramine (QUESTRAN) 4 g packet MIX 1 PACKET WITH LIQUID AND  DRINK DAILY AT BEDTIME 60 each 5    clobetasol (TEMOVATE) 0.05 % cream Apply topically 2 (two) times a day 45 g 2    diazepam (VALIUM) 2 mg tablet Take 2 mg by mouth daily at bedtime      DULoxetine (CYMBALTA) 20 mg capsule Take 1 capsule (20 mg total) by mouth every other day 90 capsule 1    esomeprazole (NexIUM) 40 MG capsule Take 1 capsule (40 mg total) by mouth daily in the early morning 90 capsule 1    mupirocin (BACTROBAN) 2 % ointment Apply topically 3 (three) times a day 30 g 0    tiotropium (Spiriva HandiHaler) 18 mcg inhalation capsule Place 18 mcg into inhaler and inhale      valsartan (DIOVAN) 40 mg tablet Take 0.5 tablets (20 mg total) by mouth daily 90 tablet 1    Cholecalciferol (Vitamin D3) 25 MCG TABS TAKE 1 TABLET BY MOUTH EVERY DAY (Patient not taking: Reported on 12/2/2024) 90 tablet 1    ergocalciferol (VITAMIN D2) 50,000 units Take 1 capsule (50,000 Units total) by mouth once a week (Patient not taking: Reported on 12/2/2024) 12 capsule 0     No current facility-administered medications on file prior to visit.     Social History     Socioeconomic History    Marital status: Legally      Spouse name: Not on file    Number of children: Not on file    Years of education: Not on file     "Highest education level: Not on file   Occupational History    Not on file   Tobacco Use    Smoking status: Never    Smokeless tobacco: Never   Vaping Use    Vaping status: Never Used   Substance and Sexual Activity    Alcohol use: Never    Drug use: Never    Sexual activity: Not Currently     Partners: Male     Birth control/protection: Post-menopausal   Other Topics Concern    Not on file   Social History Narrative    Not on file     Social Drivers of Health     Financial Resource Strain: Low Risk  (11/9/2023)    Overall Financial Resource Strain (CARDIA)     Difficulty of Paying Living Expenses: Not hard at all   Food Insecurity: Not on file   Transportation Needs: No Transportation Needs (11/9/2023)    PRAPARE - Transportation     Lack of Transportation (Medical): No     Lack of Transportation (Non-Medical): No   Physical Activity: Not on file   Stress: Not on file   Social Connections: Not on file   Intimate Partner Violence: Not on file   Housing Stability: Not on file         I have reviewed the past medical, surgical, social and family history, medications and allergies as documented in the EMR.    Review of systems: ROS is negative other than that noted in the HPI.  Constitutional: Negative for fatigue and fever.   HENT: Negative for sore throat.    Respiratory: Negative for shortness of breath.    Cardiovascular: Negative for chest pain.   Gastrointestinal: Negative for abdominal pain.   Endocrine: Negative for cold intolerance and heat intolerance.   Genitourinary: Negative for flank pain.   Musculoskeletal: Negative for back pain.   Skin: Negative for rash.   Allergic/Immunologic: Negative for immunocompromised state.   Neurological: Negative for dizziness.   Psychiatric/Behavioral: Negative for agitation.      Physical Exam:    Blood pressure 126/81, pulse 85, height 5' 1\" (1.549 m), weight 86.6 kg (191 lb).    General/Constitutional: NAD, well developed, well nourished  HENT: Normocephalic, " atraumatic  CV: Intact distal pulses, regular rate  Resp: No respiratory distress or labored breathing  Lymphatic: No lymphadenopathy palpated  Neuro: Alert and Oriented x 3, no focal deficits  Psych: Normal mood, normal affect, normal judgement, normal behavior  Skin: Warm, dry, no rashes, no erythema      Shoulder focused exam:     Visual inspection of the shoulder demonstrates normal contour without atrophy  No evidence of scapular dyskinesia or atrophy.  No scapular winging  Active and passive range of motion demonstrates forward flexion to 180, abduction to 120, external rotation is 60 with the arm the side, internal rotation to T12.  Rotator cuff strength is 5/5 to resisted forward flexion, 5/5 resisted abduction, 5/5 resisted external rotation, 5/5 resisted internal rotation  No tenderness to palpation at the distal clavicle, AC joint, acromion, or scapular spine  No pain with cross-body adduction  - Neer's test, - modified Kaminski impingement test  Negative external rotation lag sign  Negative belly press, negative lift-off  - speed's and Yergason's test  - tenderness to palpation at the bicipital groove  - Gueydan's test        UE NV Exam: +2 Radial pulses bilaterally  Sensation intact to light touch C5-T1 bilaterally, Radial/median/ulnar nerve motor intact      Bilateral elbow, wrist, and and forearm ROM full, painless with passive ROM, no ttp or crepitance throughout extremities below shoulder joint    Cervical ROM is full without pain, numbness or tingling      Shoulder Imaging    X-rays of the right shoulder were reviewed, which demonstrate moderate osteoarthritis with joint space narrowing, osteophytosis, sclerotic changes, as well as subchondral cysts along the humeral head.  I do not currently have a radiology reading from [facility], but will check the result once the reading is performed.        Scribe Attestation      I,:  Edward Thomas am acting as a scribe while in the presence of the attending  physician.:       I,:  Eduard Shine, DO personally performed the services described in this documentation    as scribed in my presence.:

## 2024-12-04 ENCOUNTER — OFFICE VISIT (OUTPATIENT)
Dept: PHYSICAL THERAPY | Facility: CLINIC | Age: 74
End: 2024-12-04
Payer: COMMERCIAL

## 2024-12-04 DIAGNOSIS — M25.511 CHRONIC RIGHT SHOULDER PAIN: Primary | ICD-10-CM

## 2024-12-04 DIAGNOSIS — G89.29 CHRONIC LEFT SHOULDER PAIN: ICD-10-CM

## 2024-12-04 DIAGNOSIS — G89.29 CHRONIC RIGHT SHOULDER PAIN: Primary | ICD-10-CM

## 2024-12-04 DIAGNOSIS — R53.1 GENERALIZED WEAKNESS: ICD-10-CM

## 2024-12-04 DIAGNOSIS — M25.512 CHRONIC LEFT SHOULDER PAIN: ICD-10-CM

## 2024-12-04 PROCEDURE — 97530 THERAPEUTIC ACTIVITIES: CPT

## 2024-12-04 PROCEDURE — 97110 THERAPEUTIC EXERCISES: CPT

## 2024-12-04 NOTE — PROGRESS NOTES
Discharge     Today's date: 2024  Patient name: Enedina Howard  : 1950  MRN: [id number]  Referring provider: Evita Dunlap*  Dx:   Encounter Diagnosis     ICD-10-CM    1. Chronic right shoulder pain  M25.511     G89.29       2. Chronic left shoulder pain  M25.512     G89.29       3. Generalized weakness  R53.1           Start Time: 1045  Stop Time: 1130  Total time in clinic (min): 45 minutes    Subjective: Pt reports that she would like to discharge to her HEP today due to increase in winter weather and ice.  She reports that she would like to work on keeping her motion and strength throughout the winter and return in the spring as she needs.        Objective: See treatment diary below      Assessment: Enedina has been compliant with attending physical therapy and completing home exercise program since initial evaluation.  Enedina has made improvements in objective data since initial evaluation and has achieved all goals.  Patient reports having returned to their prior level of function. Patient provided with updated Home Exercise Program, all questions answered, and verbalized understanding, agreeing to plan of care. Thus it was mutually decided to discontinue this episode of care and transition to Home Exercise Program.         Plan:  Discharge to [facility]       POC Expires    Auth Status no   Unit limit 3   Expiration date    PT/OT Limit        Diagnosis:  Shoulder   Precautions:  Fall risk   Comparable signs    Primary Impairments:    Patient Goals Reach overhead   Date    Used 6 2 3 4 5   Remaining        Manual Therapy           GHJ mobs           Cervical mobs           Thoracic mobs           PROM                      Exercise Diary              Therapeutic Exercise             Pulleys 2'/2'   2' scaption   2'/2'  2'/2'   Table slides 2x20 ea fwd/lat, with lifts X20 for, lat ea  x20 ea fwd/lat  2x20 ea fwd/lat  2x20 ea fwd/lat   Wall slides           UT  "stretch           LS stretch                      UBE             Neuromuscular Re-education             Scap squeeze  x20  with vertical roll  X20     x20   Chin tucks  3x10 3x10   2x10   Flx Iso        2x10   Ext Iso        2x10   AB iso           ER iso      20x5\" PTB  2x10   IR iso    20x5\"     Rows        SAPD        No monies    iso with purple band 5\" x20     2x10 YTB               Re-Eval             Therapeutic Activities             Education                                                                    Modalities                                           "

## 2025-02-10 ENCOUNTER — TELEPHONE (OUTPATIENT)
Age: 75
End: 2025-02-10

## 2025-02-10 DIAGNOSIS — F51.01 PRIMARY INSOMNIA: ICD-10-CM

## 2025-02-10 DIAGNOSIS — M79.7 FIBROMYALGIA: Primary | ICD-10-CM

## 2025-02-10 RX ORDER — DIAZEPAM 5 MG/1
TABLET ORAL
Qty: 30 TABLET | Refills: 0 | Status: SHIPPED | OUTPATIENT
Start: 2025-02-10

## 2025-02-10 NOTE — TELEPHONE ENCOUNTER
"Pt called to request to have Dr Marx send an Rx for the medication her orthopedic doctor, Dr Dunlap, used to prescribe for her.  Dr Dunlap retired and pt said she discussed with Dr Marx having him prescribe it for her.   It was last filled on 12/18/24.  The medication is Diazepam 5 mg and the instructions read Take 1/2 to 1 tablet at bedtime.  Please send Rx to [facility] on [facility].  She would also like refills on the Rx.  Please call pt to let her know when it has been sent in.  Thank you!    Pt also wanted to apologize to Dr Marx for putting off her routine appt, but she has been \"having fun with her family, and it has been wonderful.\"  She declined to schedule something now, but she is planning on scheduling the appt in the spring.  "

## 2025-03-26 ENCOUNTER — APPOINTMENT (OUTPATIENT)
Dept: LAB | Facility: CLINIC | Age: 75
End: 2025-03-26
Payer: COMMERCIAL

## 2025-03-26 DIAGNOSIS — R35.0 URINARY FREQUENCY: ICD-10-CM

## 2025-03-26 DIAGNOSIS — R39.15 URINARY URGENCY: ICD-10-CM

## 2025-03-26 LAB
BACTERIA UR QL AUTO: ABNORMAL /HPF
BILIRUB UR QL STRIP: ABNORMAL
CLARITY UR: ABNORMAL
COLOR UR: ABNORMAL
GLUCOSE UR STRIP-MCNC: NEGATIVE MG/DL
HGB UR QL STRIP.AUTO: ABNORMAL
KETONES UR STRIP-MCNC: NEGATIVE MG/DL
LEUKOCYTE ESTERASE UR QL STRIP: ABNORMAL
MUCOUS THREADS UR QL AUTO: ABNORMAL
NITRITE UR QL STRIP: POSITIVE
NON-SQ EPI CELLS URNS QL MICRO: ABNORMAL /HPF
PH UR STRIP.AUTO: 6 [PH]
PROT UR STRIP-MCNC: ABNORMAL MG/DL
RBC #/AREA URNS AUTO: ABNORMAL /HPF
SP GR UR STRIP.AUTO: 1.02 (ref 1–1.03)
UROBILINOGEN UR STRIP-ACNC: 6 MG/DL
WBC #/AREA URNS AUTO: ABNORMAL /HPF
WBC CLUMPS # UR AUTO: PRESENT /UL

## 2025-03-26 PROCEDURE — 87086 URINE CULTURE/COLONY COUNT: CPT

## 2025-03-26 PROCEDURE — 81001 URINALYSIS AUTO W/SCOPE: CPT

## 2025-03-26 PROCEDURE — 87186 SC STD MICRODIL/AGAR DIL: CPT

## 2025-03-26 PROCEDURE — 87077 CULTURE AEROBIC IDENTIFY: CPT

## 2025-03-28 ENCOUNTER — TELEPHONE (OUTPATIENT)
Age: 75
End: 2025-03-28

## 2025-03-28 DIAGNOSIS — M79.7 FIBROMYALGIA: ICD-10-CM

## 2025-03-28 DIAGNOSIS — R30.0 DYSURIA: ICD-10-CM

## 2025-03-28 DIAGNOSIS — F51.01 PRIMARY INSOMNIA: ICD-10-CM

## 2025-03-28 DIAGNOSIS — R39.9 UTI SYMPTOMS: Primary | ICD-10-CM

## 2025-03-28 DIAGNOSIS — R35.0 URINARY FREQUENCY: ICD-10-CM

## 2025-03-28 LAB — BACTERIA UR CULT: ABNORMAL

## 2025-03-28 RX ORDER — DIAZEPAM 5 MG/1
TABLET ORAL
Qty: 30 TABLET | Refills: 0 | Status: SHIPPED | OUTPATIENT
Start: 2025-03-28

## 2025-03-28 RX ORDER — CEPHALEXIN 500 MG/1
500 CAPSULE ORAL EVERY 12 HOURS SCHEDULED
Qty: 10 CAPSULE | Refills: 0 | Status: SHIPPED | OUTPATIENT
Start: 2025-03-28 | End: 2025-04-02

## 2025-03-28 NOTE — TELEPHONE ENCOUNTER
Pt called in stating that she dropped off a urine sample at the lab this weeks. Results are in and would like PCP to review. Pt states that she has a UTI and is looking for medication to be sent to pharmacy. Pt states that she also needs a refill on her diazepam. Please advise.

## 2025-03-28 NOTE — TELEPHONE ENCOUNTER
Called patient and reviewed culture with her.  It is sensitive to Keflex, she has used this frequently before and has not had no major issues or allergies.  Will place this patient on Keflex for the next 5 days.

## 2025-03-28 NOTE — TELEPHONE ENCOUNTER
Pt called back and is requesting a call back from the PCP in regards to +UA and wants a script to be called in for it. She is having frequency, burning, and pain.  Please advise.

## 2025-04-03 ENCOUNTER — CONSULT (OUTPATIENT)
Dept: PAIN MEDICINE | Facility: CLINIC | Age: 75
End: 2025-04-03
Payer: COMMERCIAL

## 2025-04-03 VITALS
SYSTOLIC BLOOD PRESSURE: 137 MMHG | BODY MASS INDEX: 36.44 KG/M2 | WEIGHT: 193 LBS | HEART RATE: 82 BPM | HEIGHT: 61 IN | DIASTOLIC BLOOD PRESSURE: 98 MMHG

## 2025-04-03 DIAGNOSIS — M51.369 LUMBAR ADJACENT SEGMENT DISEASE WITH SPONDYLOLISTHESIS: ICD-10-CM

## 2025-04-03 DIAGNOSIS — M43.16 LUMBAR ADJACENT SEGMENT DISEASE WITH SPONDYLOLISTHESIS: ICD-10-CM

## 2025-04-03 DIAGNOSIS — M54.12 RADICULOPATHY, CERVICAL REGION: ICD-10-CM

## 2025-04-03 DIAGNOSIS — M51.16 LUMBAR DISC DISEASE WITH RADICULOPATHY: Primary | ICD-10-CM

## 2025-04-03 DIAGNOSIS — E66.01 OBESITY, MORBID (HCC): ICD-10-CM

## 2025-04-03 PROCEDURE — 99204 OFFICE O/P NEW MOD 45 MIN: CPT | Performed by: PHYSICAL MEDICINE & REHABILITATION

## 2025-04-03 RX ORDER — ALBUTEROL SULFATE 90 UG/1
1 INHALANT RESPIRATORY (INHALATION) EVERY 6 HOURS PRN
COMMUNITY
Start: 2025-02-28

## 2025-04-03 RX ORDER — DOXYCYCLINE 100 MG/1
CAPSULE ORAL
COMMUNITY
Start: 2025-02-28

## 2025-04-03 NOTE — PROGRESS NOTES
Assessment  1. Lumbar disc disease with radiculopathy    2. Radiculopathy, cervical region    3. Lumbar adjacent segment disease with spondylolisthesis    4. Obesity, morbid (HCC)        Plan  Ms. Howard is a pleasant 74-year-old female significant past medical history of peripheral arterial disease, hypertension, COPD presents to [facility] as referral from Dr. Eduard Shine regarding suspected cervical radiculopathy.  He did perform a glenohumeral joint injection of the right shoulder which has provided significant improvements in her pain and she is no longer reporting any neck or shoulder issues at this time.  However she is reporting isolated low back pain with radiating symptoms into the right lower extremity currently demonstrating clinical evidence of lumbar radiculopathy into the right leg.  No recent imaging has been done but she has completed conservative measures with therapy in the past.  At this time would proceed with lumbar x-ray and lumbar MRI without contrast to better identify disc and spine pathology contributing to symptoms.  If diagnostic imaging matches clinical presentation would consider a targeted right sided epidural steroid injection.  For now we will await x-ray and MRI results.    My impressions and treatment recommendations were discussed in detail with the patient who verbalized understanding and had no further questions.  Discharge instructions were provided. I personally saw and examined the patient and I agree with the above discussed plan of care.    Orders Placed This Encounter   Procedures    XR spine lumbar minimum 4 views non injury     Standing Status:   Future     Expected Date:   4/3/2025     Expiration Date:   4/3/2029     Scheduling Instructions:      Bring along any outside films relating to this procedure.          MRI lumbar spine wo contrast     Standing Status:   Future     Expected Date:   4/3/2025     Expiration Date:   4/3/2029      "Scheduling Instructions:      There is no preparation for this test. Please leave your jewelry and valuables at home, wedding rings are the exception. All patients will be required to change into a hospital gown and pants.  Street clothes are not permitted in the MRI.  Magnetic nail polish must be removed prior to arrival for your test. Please bring your insurance cards, a form of photo ID and a list of your medications with you. Arrive 15 minutes prior to your appointment time in order to register. Please bring any prior CT or MRI studies of this area that were not performed at a [facility].            To schedule this appointment, please contact Central Scheduling at [phone].            Prior to your appointment, please make sure you complete the MRI Screening Form when you e-Check in for your appointment. This will be available starting 7 days before your appointment [facility]. You may receive an e-mail with an activation code if you do not have a [employer] account. If you do not have access to a device, we will complete your screening at your appointment.     Reason for Exam:   lumbar radiculopathy     For OP exams needed \"URGENT\", choose the appropriate timeframe below and call Central Scheduling at [phone]. No need to speak with a Radiologist.:   Not URGENT     What is the patient's sedation requirement?:   No Sedation     Does the patient need medication for Claustrophobia? If yes, order medication at this point.:   No     Does the patient wear a life vest, have an implanted cardiac device, a stimulation device, a sleep apnea stimulator, or a breast tissue expansion device?:   No     Release to patient through [employer].:   Immediate     New Medications Ordered This Visit   Medications    doxycycline hyclate (VIBRAMYCIN) 100 mg capsule    albuterol (PROVENTIL HFA,VENTOLIN HFA) 90 mcg/act inhaler     Si puff every 6 (six) hours as needed       History of Present Illness    Enedina Howard " is a 74 y.o. female presents to [facility] for initial evaluation regarding mid to low back pain with rating symptoms into the right leg currently rated 9 out of 10 and interfere with activities.  Pain is nearly constant 60 to 95% of the time described as pressure-like, throbbing, dull aching pain.  Also reports lower extremity weakness but denies any recent falls.  Requires a cane for ambulation.  Symptoms are worse with standing, bending, sitting, walking.  Reports minimal relief with topical NSAIDs, gabapentin also provides minimal relief.  Presents today for initial evaluation.    I have personally reviewed and/or updated the patient's past medical history, past surgical history, family history, social history, current medications, allergies, and vital signs today.     Review of Systems   Constitutional:  Negative for fever and unexpected weight change.   HENT:  Negative for trouble swallowing.    Eyes:  Negative for visual disturbance.   Respiratory:  Negative for shortness of breath and wheezing.    Cardiovascular:  Negative for chest pain and palpitations.   Gastrointestinal:  Negative for constipation, diarrhea, nausea and vomiting.   Endocrine: Negative for cold intolerance, heat intolerance and polydipsia.   Genitourinary:  Negative for difficulty urinating and frequency.   Musculoskeletal:  Positive for back pain. Negative for arthralgias, gait problem, joint swelling and myalgias.   Skin:  Negative for rash.   Neurological:  Negative for dizziness, seizures, syncope, weakness and headaches.   Hematological:  Does not bruise/bleed easily.   Psychiatric/Behavioral:  Negative for dysphoric mood.    All other systems reviewed and are negative.      Patient Active Problem List   Diagnosis    DJD (degenerative joint disease) of cervical spine    Arthritis    COPD (chronic obstructive pulmonary disease) ([facility])    PAD (peripheral artery disease) ([facility])    Popliteal artery injury     Gastroesophageal reflux disease    Irritable bowel syndrome with diarrhea    Hx of adenomatous colonic polyps    Age-related macular degeneration    Benign essential hypertension    Cataracts, both eyes    Lichen sclerosus et atrophicus    Nephrolithiasis    Hypokalemia    Difficulty in walking, not elsewhere classified    Dysphagia    Extrapyramidal disease    Family history of Moore syndrome    Fibromyalgia    Generalized muscle weakness    HLD (hyperlipidemia)    Knee pain    Low back pain    Major depressive disorder, single episode, unspecified    Pseudoaneurysm of femoral artery (HCC)    Unilateral primary osteoarthritis, right knee    Status post bariatric surgery    Sleep apnea    Pseudoaneurysm (HCC)    Obesity with body mass index 30 or greater    Mixed anxiety depressive disorder    Obesity, morbid (HCC)       Past Medical History:   Diagnosis Date    Abnormal ECG 2010 ?    Arthritis     Asthma     Chronic pain disorder     Colon polyp     COPD (chronic obstructive pulmonary disease) (HCC)     Coronary artery disease     Fibromyalgia, primary     GERD (gastroesophageal reflux disease)     High blood pressure     History of transfusion     Hyperlipidemia     Hypertension     Inflammatory bowel disease 2002    Irritable bowel syndrome     Kidney stone     Myocardial infarction (HCC) ?    Silent heart attack    Pneumonia 2023    PONV (postoperative nausea and vomiting)     Urinary tract infection Past 4 years    Frequent    Varicella     Visual impairment 2023    Cataracts, dry, macular degeneration       Past Surgical History:   Procedure Laterality Date    BARIATRIC SURGERY      COLONOSCOPY      GASTROSTOMY      HYSTERECTOMY Bilateral     complete  age 55    IR IVC FILTER REMOVAL  12/07/2015    IR LOWER EXTREMITY ANGIOGRAM  08/15/2022    JOINT REPLACEMENT      bilateral knee    LAPAROSCOPIC GASTRIC BANDING      POPLITEAL ARTERY STENT      2016    OK SLCTV CATHJ 3RD+ ORD SLCTV ABDL PEL/LXTR BRNCH Right  08/15/2022    Procedure: Aortogram RLE runoff L CFA access w/ 6F sheath and mynx closure R popliteal PTA w/ 4x60mm  balloon;  Surgeon: Nicky Shin MD;  Location: [facility];  Service: Vascular    REPLACEMENT TOTAL KNEE      2016    TONSILLECTOMY      US GUIDED VASCULAR ACCESS  12/07/2015       Family History   Problem Relation Age of Onset    Ovarian cancer Mother     Cancer Mother     No Known Problems Father     Colon cancer Cousin     Cancer Cousin         colon    Cancer Maternal Grandmother     Coronary artery disease Maternal Grandfather     COPD Maternal Grandfather     Osteoarthritis Paternal Grandmother     Stroke Paternal Grandmother     Arthritis Paternal Grandmother     Heart attack Paternal Grandfather     Hypertension Paternal Grandfather     Heart disease Paternal Grandfather     No Known Problems Son     No Known Problems Son     No Known Problems Maternal Aunt     Vision loss Paternal Aunt        Social History     Occupational History    Not on file   Tobacco Use    Smoking status: Never    Smokeless tobacco: Never   Vaping Use    Vaping status: Never Used   Substance and Sexual Activity    Alcohol use: Never    Drug use: Never    Sexual activity: Not Currently     Partners: Male     Birth control/protection: Post-menopausal       Current Outpatient Medications on File Prior to Visit   Medication Sig    albuterol (PROVENTIL HFA,VENTOLIN HFA) 90 mcg/act inhaler 1 puff every 6 (six) hours as needed    aspirin (ECOTRIN LOW STRENGTH) 81 mg EC tablet Take 81 mg by mouth daily    Aspirin-Acetaminophen-Caffeine (EXCEDRIN PO) Take by mouth daily    Azelastine HCl 137 MCG/SPRAY SOLN 1 SPRAY INTO EACH NOSTRIL 2 (TWO) TIMES A DAY USE IN EACH NOSTRIL AS DIRECTED    cephalexin (KEFLEX) 500 mg capsule Take 1 capsule (500 mg total) by mouth every 12 (twelve) hours for 5 days    cholestyramine (QUESTRAN) 4 g packet MIX 1 PACKET WITH LIQUID AND  DRINK DAILY AT BEDTIME    clobetasol (TEMOVATE) 0.05 %  "cream Apply topically 2 (two) times a day    diazepam (VALIUM) 5 mg tablet Take 1/2 to 1 tablet at bedtime    DULoxetine (CYMBALTA) 20 mg capsule Take 1 capsule (20 mg total) by mouth every other day    esomeprazole (NexIUM) 40 MG capsule Take 1 capsule (40 mg total) by mouth daily in the early morning    valsartan (DIOVAN) 40 mg tablet Take 0.5 tablets (20 mg total) by mouth daily    Cholecalciferol (Vitamin D3) 25 MCG TABS TAKE 1 TABLET BY MOUTH EVERY DAY (Patient not taking: Reported on 12/2/2024)    doxycycline hyclate (VIBRAMYCIN) 100 mg capsule  (Patient not taking: Reported on 4/3/2025)    ergocalciferol (VITAMIN D2) 50,000 units Take 1 capsule (50,000 Units total) by mouth once a week (Patient not taking: Reported on 12/2/2024)    mupirocin (BACTROBAN) 2 % ointment Apply topically 3 (three) times a day (Patient not taking: Reported on 4/3/2025)    tiotropium (Spiriva HandiHaler) 18 mcg inhalation capsule Place 18 mcg into inhaler and inhale     No current facility-administered medications on file prior to visit.       Allergies   Allergen Reactions    Erythromycin Nausea Only     Nausea      Levofloxacin     Codeine Other (See Comments)     RINGING IN EARS AND DIFFICULTY SLEEPING    Trospium Other (See Comments)     Dry mouth       Physical Exam    /98 (BP Location: Left arm, Patient Position: Sitting, Cuff Size: Standard)   Pulse 82   Ht 5' 1\" (1.549 m)   Wt 87.5 kg (193 lb)   BMI 36.47 kg/m²     Constitutional: normal, well developed, well nourished, alert, in no distress and non-toxic and no overt pain behavior.  Eyes: anicteric  HEENT: grossly intact  Neck: supple, symmetric, trachea midline and no masses   Pulmonary:even and unlabored  Cardiovascular:No edema or pitting edema present  Skin:Normal without rashes or lesions and well hydrated  Psychiatric:Mood and affect appropriate  Neurologic:Cranial Nerves II-XII grossly intact  Musculoskeletal:normal gait, tenderness to palpation right side " lumbar paraspinals, decreased active and passive range of motion lumbar flexion and extension limited by pain, MMT 5 out of 5 bilateral lower extremities, sensation grossly tact to light touch, positive straight leg raise in the seated position radicular pain into the right leg    Imaging

## 2025-04-08 DIAGNOSIS — K21.9 GASTROESOPHAGEAL REFLUX DISEASE, UNSPECIFIED WHETHER ESOPHAGITIS PRESENT: ICD-10-CM

## 2025-04-09 RX ORDER — ESOMEPRAZOLE MAGNESIUM 40 MG/1
CAPSULE, DELAYED RELEASE ORAL
Qty: 90 CAPSULE | Refills: 1 | Status: SHIPPED | OUTPATIENT
Start: 2025-04-09

## 2025-04-10 ENCOUNTER — OFFICE VISIT (OUTPATIENT)
Dept: URGENT CARE | Facility: CLINIC | Age: 75
End: 2025-04-10
Payer: COMMERCIAL

## 2025-04-10 VITALS
RESPIRATION RATE: 16 BRPM | OXYGEN SATURATION: 94 % | TEMPERATURE: 97.7 F | HEART RATE: 85 BPM | DIASTOLIC BLOOD PRESSURE: 90 MMHG | SYSTOLIC BLOOD PRESSURE: 135 MMHG

## 2025-04-10 DIAGNOSIS — H61.23 BILATERAL IMPACTED CERUMEN: Primary | ICD-10-CM

## 2025-04-10 PROCEDURE — S9083 URGENT CARE CENTER GLOBAL: HCPCS

## 2025-04-10 PROCEDURE — 99213 OFFICE O/P EST LOW 20 MIN: CPT

## 2025-04-10 PROCEDURE — 69210 REMOVE IMPACTED EAR WAX UNI: CPT

## 2025-04-10 NOTE — PATIENT INSTRUCTIONS
Use over-the-counter Debrox drops in the following manner: 5 drops in each ear daily for 5 consecutive days every month.     Follow-up with PCP or come back to [facility] for additional attempts at removal of ear wax.    Go to the ED for any severely worsening symptoms.

## 2025-04-10 NOTE — PROGRESS NOTES
[facility]        NAME: Enedina Howard is a 74 y.o. female  : 1950    MRN: [id number]  DATE: April 10, 2025  TIME: 1:26 PM    Assessment and Plan   Bilateral impacted cerumen [H61.23]  1. Bilateral impacted cerumen  carbamide peroxide (DEBROX) 6.5 % otic solution    Ear cerumen removal            Patient Instructions     Use over-the-counter Debrox drops in the following manner: 5 drops in each ear daily for 5 consecutive days every month.     Follow-up with PCP or come back to [facility] for additional attempts at removal of ear wax.    Go to the ED for any severely worsening symptoms.     If tests are performed, our office will contact you with results only if changes need to made to the care plan discussed with you at the visit. You can review your full results on [facility].      Chief Complaint     Chief Complaint   Patient presents with    Ear Fullness     Pt states her left ear has become clogged, not sure if it is wax or she is getting deafness. States she became dizzy yesterday from turning her head. Also states she recently has a post nasal drip          History of Present Illness       74-year-old female presenting with muffled hearing of bilateral ears. Occasionally feels dizzy if moving her head too fast. No fevers, headaches, or ear drainage. Reports history of cerumen impaction. No attempts at removal prior to arrival.         Review of Systems   Review of Systems   Constitutional:  Negative for fever.   HENT:  Positive for ear pain and hearing loss (muffled). Negative for ear discharge, facial swelling and tinnitus.    Respiratory:  Negative for shortness of breath.    Cardiovascular:  Negative for chest pain.   Neurological:  Positive for dizziness. Negative for headaches.         Current Medications       Current Outpatient Medications:     carbamide peroxide (DEBROX) 6.5 % otic solution, Administer 5 drops into both ears 2 (two) times a day, Disp: 15 mL, Rfl: 0     albuterol (PROVENTIL HFA,VENTOLIN HFA) 90 mcg/act inhaler, 1 puff every 6 (six) hours as needed, Disp: , Rfl:     aspirin (ECOTRIN LOW STRENGTH) 81 mg EC tablet, Take 81 mg by mouth daily, Disp: , Rfl:     Aspirin-Acetaminophen-Caffeine (EXCEDRIN PO), Take by mouth daily, Disp: , Rfl:     Azelastine HCl 137 MCG/SPRAY SOLN, 1 SPRAY INTO EACH NOSTRIL 2 (TWO) TIMES A DAY USE IN EACH NOSTRIL AS DIRECTED, Disp: 30 mL, Rfl: 1    Cholecalciferol (Vitamin D3) 25 MCG TABS, TAKE 1 TABLET BY MOUTH EVERY DAY (Patient not taking: Reported on 12/2/2024), Disp: 90 tablet, Rfl: 1    cholestyramine (QUESTRAN) 4 g packet, MIX 1 PACKET WITH LIQUID AND  DRINK DAILY AT BEDTIME, Disp: 60 each, Rfl: 5    clobetasol (TEMOVATE) 0.05 % cream, Apply topically 2 (two) times a day, Disp: 45 g, Rfl: 2    diazepam (VALIUM) 5 mg tablet, Take 1/2 to 1 tablet at bedtime, Disp: 30 tablet, Rfl: 0    doxycycline hyclate (VIBRAMYCIN) 100 mg capsule, , Disp: , Rfl:     DULoxetine (CYMBALTA) 20 mg capsule, Take 1 capsule (20 mg total) by mouth every other day, Disp: 90 capsule, Rfl: 1    ergocalciferol (VITAMIN D2) 50,000 units, Take 1 capsule (50,000 Units total) by mouth once a week (Patient not taking: Reported on 12/2/2024), Disp: 12 capsule, Rfl: 0    esomeprazole (NexIUM) 40 MG capsule, TAKE 1 CAPSULE BY MOUTH DAILY IN THE EARLY IN THE MORNING, Disp: 90 capsule, Rfl: 1    mupirocin (BACTROBAN) 2 % ointment, Apply topically 3 (three) times a day (Patient not taking: Reported on 4/3/2025), Disp: 30 g, Rfl: 0    tiotropium (Spiriva HandiHaler) 18 mcg inhalation capsule, Place 18 mcg into inhaler and inhale, Disp: , Rfl:     valsartan (DIOVAN) 40 mg tablet, Take 0.5 tablets (20 mg total) by mouth daily, Disp: 90 tablet, Rfl: 1    Current Allergies     Allergies as of 04/10/2025 - Reviewed 04/10/2025   Allergen Reaction Noted    Erythromycin Nausea Only 06/16/2017    Levofloxacin  01/25/2016    Codeine Other (See Comments) 04/19/2018    Trospium Other  (See Comments) 09/01/2023            The following portions of the patient's history were reviewed and updated as appropriate: allergies, current medications, past family history, past medical history, past social history, past surgical history and problem list.     Past Medical History:   Diagnosis Date    Abnormal ECG 2010 ?    Arthritis     Asthma     Chronic pain disorder     Colon polyp     COPD (chronic obstructive pulmonary disease) (HCC)     Coronary artery disease     Fibromyalgia, primary     GERD (gastroesophageal reflux disease)     High blood pressure     History of transfusion     Hyperlipidemia     Hypertension     Inflammatory bowel disease 2002    Irritable bowel syndrome     Kidney stone     Myocardial infarction (HCC) ?    Silent heart attack    Pneumonia 2023    PONV (postoperative nausea and vomiting)     Urinary tract infection Past 4 years    Frequent    Varicella     Visual impairment 2023    Cataracts, dry, macular degeneration       Past Surgical History:   Procedure Laterality Date    BARIATRIC SURGERY      COLONOSCOPY      GASTROSTOMY      HYSTERECTOMY Bilateral     complete  age 55    IR IVC FILTER REMOVAL  12/07/2015    IR LOWER EXTREMITY ANGIOGRAM  08/15/2022    JOINT REPLACEMENT      bilateral knee    LAPAROSCOPIC GASTRIC BANDING      POPLITEAL ARTERY STENT      2016    [place] SLCTV CATHJ 3RD+ ORD SLCTV ABDL PEL/LXTR BRNCH Right 08/15/2022    Procedure: Aortogram RLE runoff L CFA access w/ 6F sheath and mynx closure R popliteal PTA w/ 4x60mm  balloon;  Surgeon: Nicky Shin MD;  Location: [facility];  Service: Vascular    REPLACEMENT TOTAL KNEE      2016    TONSILLECTOMY      US GUIDED VASCULAR ACCESS  12/07/2015       Family History   Problem Relation Age of Onset    Ovarian cancer Mother     Cancer Mother     No Known Problems Father     Colon cancer Cousin     Cancer Cousin         colon    Cancer Maternal Grandmother     Coronary artery disease Maternal Grandfather     COPD  "Maternal Grandfather     Osteoarthritis Paternal Grandmother     Stroke Paternal Grandmother     Arthritis Paternal Grandmother     Heart attack Paternal Grandfather     Hypertension Paternal Grandfather     Heart disease Paternal Grandfather     No Known Problems Son     No Known Problems Son     No Known Problems Maternal Aunt     Vision loss Paternal Aunt          Medications have been verified.        Objective   /90   Pulse 85   Temp 97.7 °F (36.5 °C)   Resp 16   SpO2 94%        Physical Exam     Physical Exam  Vitals and nursing note reviewed.   Constitutional:       General: She is not in acute distress.     Appearance: She is not ill-appearing.   HENT:      Head: Normocephalic and atraumatic.      Right Ear: There is impacted cerumen.      Left Ear: There is impacted cerumen.      Mouth/Throat:      Mouth: Mucous membranes are moist.   Cardiovascular:      Rate and Rhythm: Normal rate.   Pulmonary:      Effort: Pulmonary effort is normal.   Musculoskeletal:         General: Normal range of motion.   Skin:     General: Skin is warm and dry.   Neurological:      Mental Status: She is alert and oriented to person, place, and time.      Gait: Gait normal.         Ear cerumen removal    Date/Time: 4/10/2025 1:10 PM    Performed by: LEON Sabillon  Authorized by: LEON Sabillon  Universal Protocol:  Consent: Verbal consent obtained.  Risks and benefits: risks, benefits and alternatives were discussed  Consent given by: patient  Time out: Immediately prior to procedure a \"time out\" was called to verify the correct patient, procedure, equipment, support staff and site/side marked as required.  Timeout called at: 4/10/2025 1:05 PM.  Patient understanding: patient states understanding of the procedure being performed  Patient identity confirmed: verbally with patient    Patient location:  Clinic  Indications / Diagnosis:  Cerumen impaction, bilateral  Procedure details:     Local anesthetic:  " None    Location:  Both ears    Procedure type: irrigation with instrumentation      Instrumentation: curette    Post-procedure details:     Complication:  None    Hearing quality:  Improved    Patient tolerance of procedure:  Tolerated well, no immediate complications  Comments:      Cerumen removed from left ear without difficulty. Ear examined after procedure. No sign of AOM or OME. Unable to remove cerumen from right ear. Patient will administer debrox and follow-up for cerumen removal.

## 2025-04-18 ENCOUNTER — OFFICE VISIT (OUTPATIENT)
Dept: URGENT CARE | Facility: CLINIC | Age: 75
End: 2025-04-18
Payer: COMMERCIAL

## 2025-04-18 VITALS
RESPIRATION RATE: 20 BRPM | TEMPERATURE: 99.2 F | BODY MASS INDEX: 36.44 KG/M2 | SYSTOLIC BLOOD PRESSURE: 129 MMHG | OXYGEN SATURATION: 95 % | DIASTOLIC BLOOD PRESSURE: 78 MMHG | HEIGHT: 61 IN | HEART RATE: 107 BPM | WEIGHT: 193 LBS

## 2025-04-18 DIAGNOSIS — J01.00 ACUTE MAXILLARY SINUSITIS, RECURRENCE NOT SPECIFIED: Primary | ICD-10-CM

## 2025-04-18 DIAGNOSIS — J40 BRONCHITIS: ICD-10-CM

## 2025-04-18 PROCEDURE — 99213 OFFICE O/P EST LOW 20 MIN: CPT | Performed by: PHYSICIAN ASSISTANT

## 2025-04-18 PROCEDURE — S9083 URGENT CARE CENTER GLOBAL: HCPCS | Performed by: PHYSICIAN ASSISTANT

## 2025-04-18 RX ORDER — DOXYCYCLINE 100 MG/1
100 TABLET ORAL 2 TIMES DAILY
Qty: 20 TABLET | Refills: 0 | Status: SHIPPED | OUTPATIENT
Start: 2025-04-18 | End: 2025-04-28

## 2025-04-18 RX ORDER — AMOXICILLIN 500 MG/1
CAPSULE ORAL
COMMUNITY
Start: 2025-04-09

## 2025-04-18 RX ORDER — PREDNISONE 10 MG/1
TABLET ORAL
Qty: 20 TABLET | Refills: 0 | Status: SHIPPED | OUTPATIENT
Start: 2025-04-18

## 2025-04-18 NOTE — PROGRESS NOTES
"[facility]        NAME: Enedina Howard is a 74 y.o. female  : 1950    MRN: [id number]  DATE: 2025  TIME: 2:04 PM    /78   Pulse (!) 107   Temp 99.2 °F (37.3 °C)   Resp 20   Ht 5' 1\" (1.549 m)   Wt 87.5 kg (193 lb)   SpO2 95%   BMI 36.47 kg/m²     Assessment and Plan   Acute maxillary sinusitis, recurrence not specified [J01.00]  1. Acute maxillary sinusitis, recurrence not specified  doxycycline (ADOXA) 100 MG tablet    predniSONE 10 mg tablet      2. Bronchitis  doxycycline (ADOXA) 100 MG tablet    predniSONE 10 mg tablet            Patient Instructions       Follow up with PCP in 3-5 days.  Proceed to  ER if symptoms worsen.    Chief Complaint     Chief Complaint   Patient presents with    Cold Like Symptoms    Fever    Cough     Flu like s/s x congestion, knee pain and cough.          History of Present Illness       Pt with nasal congestion sinus pain  some body aches joint aches minor cough x 3 days    Fever  Associated symptoms include congestion and coughing.   Cough        Review of Systems   Review of Systems   Constitutional: Negative.    HENT:  Positive for congestion, sinus pressure and sinus pain.    Eyes: Negative.    Respiratory:  Positive for cough.    Cardiovascular: Negative.    Gastrointestinal: Negative.    Endocrine: Negative.    Genitourinary: Negative.    Musculoskeletal: Negative.    Skin: Negative.    Allergic/Immunologic: Negative.    Neurological: Negative.    Hematological: Negative.    Psychiatric/Behavioral: Negative.     All other systems reviewed and are negative.        Current Medications       Current Outpatient Medications:     amoxicillin (AMOXIL) 500 mg capsule, take 4 capsules 1 hour before dental appointment, Disp: , Rfl:     doxycycline (ADOXA) 100 MG tablet, Take 1 tablet (100 mg total) by mouth 2 (two) times a day for 10 days, Disp: 20 tablet, Rfl: 0    predniSONE 10 mg tablet, 4 tabs po qd x 2 days then 3 tabs po qd x 2 days then 2 " tabs po qd x 2 days then 1 tab po qd x 2 days, Disp: 20 tablet, Rfl: 0    albuterol (PROVENTIL HFA,VENTOLIN HFA) 90 mcg/act inhaler, 1 puff every 6 (six) hours as needed, Disp: , Rfl:     aspirin (ECOTRIN LOW STRENGTH) 81 mg EC tablet, Take 81 mg by mouth daily, Disp: , Rfl:     Aspirin-Acetaminophen-Caffeine (EXCEDRIN PO), Take by mouth daily, Disp: , Rfl:     Azelastine HCl 137 MCG/SPRAY SOLN, 1 SPRAY INTO EACH NOSTRIL 2 (TWO) TIMES A DAY USE IN EACH NOSTRIL AS DIRECTED, Disp: 30 mL, Rfl: 1    carbamide peroxide (DEBROX) 6.5 % otic solution, Administer 5 drops into both ears 2 (two) times a day, Disp: 15 mL, Rfl: 0    Cholecalciferol (Vitamin D3) 25 MCG TABS, TAKE 1 TABLET BY MOUTH EVERY DAY (Patient not taking: Reported on 12/2/2024), Disp: 90 tablet, Rfl: 1    cholestyramine (QUESTRAN) 4 g packet, MIX 1 PACKET WITH LIQUID AND  DRINK DAILY AT BEDTIME, Disp: 60 each, Rfl: 5    clobetasol (TEMOVATE) 0.05 % cream, Apply topically 2 (two) times a day, Disp: 45 g, Rfl: 2    diazepam (VALIUM) 5 mg tablet, Take 1/2 to 1 tablet at bedtime, Disp: 30 tablet, Rfl: 0    doxycycline hyclate (VIBRAMYCIN) 100 mg capsule, , Disp: , Rfl:     DULoxetine (CYMBALTA) 20 mg capsule, Take 1 capsule (20 mg total) by mouth every other day, Disp: 90 capsule, Rfl: 1    ergocalciferol (VITAMIN D2) 50,000 units, Take 1 capsule (50,000 Units total) by mouth once a week (Patient not taking: Reported on 12/2/2024), Disp: 12 capsule, Rfl: 0    esomeprazole (NexIUM) 40 MG capsule, TAKE 1 CAPSULE BY MOUTH DAILY IN THE EARLY IN THE MORNING, Disp: 90 capsule, Rfl: 1    mupirocin (BACTROBAN) 2 % ointment, Apply topically 3 (three) times a day (Patient not taking: Reported on 4/3/2025), Disp: 30 g, Rfl: 0    tiotropium (Spiriva HandiHaler) 18 mcg inhalation capsule, Place 18 mcg into inhaler and inhale, Disp: , Rfl:     valsartan (DIOVAN) 40 mg tablet, Take 0.5 tablets (20 mg total) by mouth daily, Disp: 90 tablet, Rfl: 1    Current Allergies      Allergies as of 04/18/2025 - Reviewed 04/18/2025   Allergen Reaction Noted    Erythromycin Nausea Only 06/16/2017    Levofloxacin  01/25/2016    Codeine Other (See Comments) 04/19/2018    Trospium Other (See Comments) 09/01/2023            The following portions of the patient's history were reviewed and updated as appropriate: allergies, current medications, past family history, past medical history, past social history, past surgical history and problem list.     Past Medical History:   Diagnosis Date    Abnormal ECG 2010 ?    Arthritis     Asthma     Chronic pain disorder     Colon polyp     COPD (chronic obstructive pulmonary disease) (HCC)     Coronary artery disease     Fibromyalgia, primary     GERD (gastroesophageal reflux disease)     High blood pressure     History of transfusion     Hyperlipidemia     Hypertension     Inflammatory bowel disease 2002    Irritable bowel syndrome     Kidney stone     Myocardial infarction (HCC) ?    Silent heart attack    Pneumonia 2023    PONV (postoperative nausea and vomiting)     Urinary tract infection Past 4 years    Frequent    Varicella     Visual impairment 2023    Cataracts, dry, macular degeneration       Past Surgical History:   Procedure Laterality Date    BARIATRIC SURGERY      COLONOSCOPY      GASTROSTOMY      HYSTERECTOMY Bilateral     complete  age 55    IR IVC FILTER REMOVAL  12/07/2015    IR LOWER EXTREMITY ANGIOGRAM  08/15/2022    JOINT REPLACEMENT      bilateral knee    LAPAROSCOPIC GASTRIC BANDING      POPLITEAL ARTERY STENT      2016    CT SLCTV CATHJ 3RD+ ORD SLCTV ABDL PEL/LXTR BRNCH Right 08/15/2022    Procedure: Aortogram RLE runoff L CFA access w/ 6F sheath and mynx closure R popliteal PTA w/ 4x60mm  balloon;  Surgeon: Nicky Shin MD;  [facility];  Service: Vascular    REPLACEMENT TOTAL KNEE      2016    TONSILLECTOMY      US GUIDED VASCULAR ACCESS  12/07/2015       Family History   Problem Relation Age of Onset    Ovarian  "cancer Mother     Cancer Mother     No Known Problems Father     Colon cancer Cousin     Cancer Cousin         colon    Cancer Maternal Grandmother     Coronary artery disease Maternal Grandfather     COPD Maternal Grandfather     Osteoarthritis Paternal Grandmother     Stroke Paternal Grandmother     Arthritis Paternal Grandmother     Heart attack Paternal Grandfather     Hypertension Paternal Grandfather     Heart disease Paternal Grandfather     No Known Problems Son     No Known Problems Son     No Known Problems Maternal Aunt     Vision loss Paternal Aunt          Medications have been verified.        Objective   /78   Pulse (!) 107   Temp 99.2 °F (37.3 °C)   Resp 20   Ht 5' 1\" (1.549 m)   Wt 87.5 kg (193 lb)   SpO2 95%   BMI 36.47 kg/m²        Physical Exam     Physical Exam  Vitals and nursing note reviewed.   Constitutional:       Appearance: Normal appearance. She is normal weight.      Comments: Pt declines covid and flu testing    HENT:      Head: Normocephalic and atraumatic.      Right Ear: Tympanic membrane, ear canal and external ear normal.      Left Ear: Tympanic membrane, ear canal and external ear normal.      Nose: Congestion and rhinorrhea present.      Comments: Boggy mucosa  yellow d/c      Mouth/Throat:      Mouth: Mucous membranes are moist.      Pharynx: Oropharynx is clear.   Eyes:      Extraocular Movements: Extraocular movements intact.      Conjunctiva/sclera: Conjunctivae normal.      Pupils: Pupils are equal, round, and reactive to light.   Cardiovascular:      Rate and Rhythm: Normal rate and regular rhythm.      Pulses: Normal pulses.      Heart sounds: Normal heart sounds.   Pulmonary:      Effort: Pulmonary effort is normal.      Comments: Minor coarse sounds cleared with cough   Abdominal:      General: Bowel sounds are normal.      Palpations: Abdomen is soft.   Musculoskeletal:         General: Normal range of motion.      Cervical back: Normal range of motion and " neck supple.      Comments: Bilat knees aching  no swelling no erythema  no warmth    Skin:     General: Skin is warm.      Capillary Refill: Capillary refill takes less than 2 seconds.   Neurological:      Mental Status: She is alert and oriented to person, place, and time.   Psychiatric:         Mood and Affect: Mood normal.

## 2025-04-24 ENCOUNTER — VBI (OUTPATIENT)
Dept: ADMINISTRATIVE | Facility: OTHER | Age: 75
End: 2025-04-24

## 2025-04-24 NOTE — TELEPHONE ENCOUNTER
04/24/25 3:09 PM     Chart reviewed for Mammogram ; nothing is submitted to the patient's insurance at this time.     Nasra David   [facility]

## 2025-04-30 ENCOUNTER — TELEPHONE (OUTPATIENT)
Age: 75
End: 2025-04-30

## 2025-04-30 DIAGNOSIS — I73.9 PAD (PERIPHERAL ARTERY DISEASE) (HCC): Primary | ICD-10-CM

## 2025-04-30 DIAGNOSIS — I10 BENIGN ESSENTIAL HYPERTENSION: ICD-10-CM

## 2025-04-30 DIAGNOSIS — M79.7 FIBROMYALGIA: ICD-10-CM

## 2025-04-30 DIAGNOSIS — F51.01 PRIMARY INSOMNIA: ICD-10-CM

## 2025-04-30 RX ORDER — VALSARTAN 40 MG/1
20 TABLET ORAL DAILY
Qty: 90 TABLET | Refills: 1 | Status: SHIPPED | OUTPATIENT
Start: 2025-04-30

## 2025-04-30 NOTE — TELEPHONE ENCOUNTER
Bed: Y01  Expected date:   Expected time:   Means of arrival:   Comments:  PZ PELVIC   Caller: Enedina    Doctor: Dr. Saunders Doctor    Reason for call: patient states every year she has testing (vas arterial duplex) she would like to do her test but there isnt one ordered. Please order and let patient know so she can schedule.     Call back#: [phone]

## 2025-04-30 NOTE — TELEPHONE ENCOUNTER
Reason for call:   [x] Refill   [] Prior Auth  [] Other:     Office:   [x] PCP/Provider -  Alin Marx / [facility]   [] Specialty/Provider -     Medication: valsartan (DIOVAN) 40 mg tablet     Dose/Frequency: Take 0.5 tablets (20 mg total) by mouth daily,     Quantity: 45      Medication: diazepam (VALIUM) 5 mg tablet     Dose/Frequency: : Take 1/2 to 1 tablet at bedtime,     Quantity: 30      Pharmacy: [facility]/pharmacy #[id number] - [place] - [address] [place] [address].      Local Pharmacy   Does the patient have enough for 3 days?   [] Yes   [x] No - Send as HP to POD

## 2025-05-01 RX ORDER — DIAZEPAM 5 MG/1
TABLET ORAL
Qty: 30 TABLET | Refills: 0 | Status: SHIPPED | OUTPATIENT
Start: 2025-05-01

## 2025-05-06 ENCOUNTER — DOCUMENTATION (OUTPATIENT)
Dept: ADMINISTRATIVE | Facility: OTHER | Age: 75
End: 2025-05-06

## 2025-05-06 NOTE — PROGRESS NOTES
05/06/25 3:21 PM    Annual Wellness Visit outreach is not required, patient has an upcoming appointment with the PCP office.    Thank you.  Alia Babb MA  [facility]

## 2025-05-27 ENCOUNTER — RA CDI HCC (OUTPATIENT)
Dept: OTHER | Facility: HOSPITAL | Age: 75
End: 2025-05-27

## 2025-06-03 ENCOUNTER — OFFICE VISIT (OUTPATIENT)
Dept: FAMILY MEDICINE CLINIC | Facility: CLINIC | Age: 75
End: 2025-06-03
Payer: COMMERCIAL

## 2025-06-03 VITALS
OXYGEN SATURATION: 97 % | HEART RATE: 102 BPM | BODY MASS INDEX: 36.06 KG/M2 | DIASTOLIC BLOOD PRESSURE: 78 MMHG | SYSTOLIC BLOOD PRESSURE: 122 MMHG | WEIGHT: 191 LBS | HEIGHT: 61 IN

## 2025-06-03 DIAGNOSIS — Z13.1 SCREENING FOR DIABETES MELLITUS: ICD-10-CM

## 2025-06-03 DIAGNOSIS — Z13.9 ENCOUNTER FOR RISK AND FUNCTIONAL ASSESSMENT OF PATIENT: ICD-10-CM

## 2025-06-03 DIAGNOSIS — I10 BENIGN ESSENTIAL HYPERTENSION: ICD-10-CM

## 2025-06-03 DIAGNOSIS — M51.369 DEGENERATION OF INTERVERTEBRAL DISC OF LUMBAR REGION, UNSPECIFIED WHETHER PAIN PRESENT: Primary | ICD-10-CM

## 2025-06-03 DIAGNOSIS — K58.0 IRRITABLE BOWEL SYNDROME WITH DIARRHEA: ICD-10-CM

## 2025-06-03 DIAGNOSIS — E78.2 MIXED HYPERLIPIDEMIA: ICD-10-CM

## 2025-06-03 DIAGNOSIS — F51.01 PRIMARY INSOMNIA: ICD-10-CM

## 2025-06-03 DIAGNOSIS — R73.01 ELEVATED FASTING BLOOD SUGAR: ICD-10-CM

## 2025-06-03 DIAGNOSIS — R29.898 UPPER EXTREMITY WEAKNESS: ICD-10-CM

## 2025-06-03 DIAGNOSIS — R53.82 CHRONIC FATIGUE: ICD-10-CM

## 2025-06-03 DIAGNOSIS — Z13.0 SCREENING FOR DEFICIENCY ANEMIA: ICD-10-CM

## 2025-06-03 DIAGNOSIS — M19.90 ARTHRITIS: ICD-10-CM

## 2025-06-03 DIAGNOSIS — J41.0 SIMPLE CHRONIC BRONCHITIS (HCC): ICD-10-CM

## 2025-06-03 DIAGNOSIS — Z91.81 PERSONAL HISTORY OF FALL: ICD-10-CM

## 2025-06-03 DIAGNOSIS — M79.7 FIBROMYALGIA: ICD-10-CM

## 2025-06-03 DIAGNOSIS — Z83.3 FAMILY HISTORY OF DIABETES MELLITUS: ICD-10-CM

## 2025-06-03 PROCEDURE — 99214 OFFICE O/P EST MOD 30 MIN: CPT | Performed by: FAMILY MEDICINE

## 2025-06-03 PROCEDURE — G2211 COMPLEX E/M VISIT ADD ON: HCPCS | Performed by: FAMILY MEDICINE

## 2025-06-03 PROCEDURE — G0439 PPPS, SUBSEQ VISIT: HCPCS | Performed by: FAMILY MEDICINE

## 2025-06-03 RX ORDER — DIAZEPAM 5 MG/1
TABLET ORAL
Qty: 30 TABLET | Refills: 2 | Status: SHIPPED | OUTPATIENT
Start: 2025-06-03

## 2025-06-03 RX ORDER — TIOTROPIUM BROMIDE 18 UG/1
18 CAPSULE ORAL; RESPIRATORY (INHALATION) DAILY
Qty: 30 CAPSULE | Refills: 5 | Status: SHIPPED | OUTPATIENT
Start: 2025-06-03

## 2025-06-03 RX ORDER — CELECOXIB 100 MG/1
100 CAPSULE ORAL 2 TIMES DAILY
Qty: 60 CAPSULE | Refills: 2 | Status: SHIPPED | OUTPATIENT
Start: 2025-06-03

## 2025-06-03 NOTE — ASSESSMENT & PLAN NOTE
History of mixed hyperlipidemia.  She is not on any medication for cholesterol.  She would like to avoid this if possible due to the possible  Orders:    Lipid panel; Future

## 2025-06-03 NOTE — PROGRESS NOTES
Name: Enedina Howard      : 1950      MRN: [id number]  Encounter Provider: Alin Marx DO  Encounter Date: 6/3/2025   Encounter department: [facility]  :  Assessment & Plan  Degeneration of intervertebral disc of lumbar region, unspecified whether pain present  Arthritis  Upper extremity weakness  Encounter for risk and functional assessment of patient  Personal history of fall  Patient has chronic pain associated with arthritis, DDD of the lumbar region, and has had multiple falls.  Patient is interested in following up with physical therapy to improve strength, mobility, and work on functional assessment/risk.  Patient's symptoms of severe pain in multiple joints should be addressed, unfortunately she has attempted several different medications in the past.  She does not believe that she is attempted Celebrex, kidney function is within normal limits.  Will continue to have the patient monitor her pain while taking Celebrex 100 mg twice a day  Orders:    Ambulatory Referral to Physical Therapy; Future    celecoxib (CeleBREX) 100 mg capsule; Take 1 capsule (100 mg total) by mouth 2 (two) times a day    Comprehensive metabolic panel; Future    Vitamin D 25 hydroxy; Future    Simple chronic bronchitis (HCC)  Patient is interested in following up with inhalers that she had previously.  The Spiriva seems to be working well for the patient, she is unable to take an inhaled steroid.  Will continue with Spiriva and albuterol as needed.  Patient does not require any refill of albuterol.  Patient has a diagnosis of COPD.  Orders:    tiotropium (Spiriva HandiHaler) 18 mcg inhalation capsule; Place 1 capsule (18 mcg total) into inhaler and inhale daily    Comprehensive metabolic panel; Future    Primary insomnia  Fibromyalgia  Chronic fatigue  Patient's insomnia and fibromyalgia are directly related to each other.  Unfortunately due to the pain and multiple joint issues, she has  difficulty falling asleep.  The Valium 5 mg 1/2 to 1 tablet at bedtime helps her to relax and go to sleep, unfortunately does not keep her asleep if she wakes up.  Again we will attempt Celebrex to help with her overall pain and discomfort and hopefully improve her sleep.    Orders:    diazepam (VALIUM) 5 mg tablet; Take 1/2 to 1 tablet at bedtime    celecoxib (CeleBREX) 100 mg capsule; Take 1 capsule (100 mg total) by mouth 2 (two) times a day    CBC and differential; Future    Comprehensive metabolic panel; Future    TSH, 3rd generation with Free T4 reflex; Future    Benign essential hypertension  Stable.  Patient to continue on Diovan 20 mg daily.  She has been doing very well on this medication since starting it.  She has had no significant complications or kidney dysfunction  Orders:    Comprehensive metabolic panel; Future    Mixed hyperlipidemia  History of mixed hyperlipidemia.  She is not on any medication for cholesterol.  She would like to avoid this if possible due to the possible  Orders:    Lipid panel; Future    Irritable bowel syndrome with diarrhea  Follows with GI.  She has been monitoring her oral intake and avoiding medications that may make it flare.  Nexium 40 mg also helps to reduce risk of issues and pain.  The cholestyramine has also helped her to avoid excessive diarrhea.  This is through her GI       Screening for deficiency anemia  Screening for diabetes mellitus  Family history of diabetes mellitus  Elevated fasting blood sugar  Patient is to obtain follow-up labs for yearly evaluation.  Will obtain CBC to look for any evidence of anemia, A1c obtained to screen for diabetes and due to family history of type 2 diabetes.  History of elevated fasting sugar.    Orders:    CBC and differential; Future    Hemoglobin A1C; Future    Body mass index (BMI) 36.0-36.9, adult  Patient is to evaluate for vitamin D.  Orders:    Vitamin D 25 hydroxy; Future       Preventive health issues were discussed  with patient, and age appropriate screening tests were ordered as noted in patient's After Visit Summary. Personalized health advice and appropriate referrals for health education or preventive services given if needed, as noted in patient's After Visit Summary.    History of Present Illness     Overall patient presents today to review chronic conditions and for Medicare wellness visit.  Overall she feels that the medication regiment she is on currently is controlling her symptoms well.  She does require several refills for her medications including Spiriva and Valium.  She is requesting that the Valium be sent for 3 months since this was previously performed by her orthopedic physician before.  This significantly helps with the underlying DDD and fibromyalgia.  Additionally patient has a known history of COPD.  She is interested in having the Spiriva refilled to the pharmacy.  She had this previously prescribed by another provider, but is interested in starting to get it through [facility] and our office.    Overall the patient seems to be doing well with her blood pressure.  122/78 on presentation today.  She is taking the Diovan 20 mg and has not required any further titration.  She is tolerating the medication well and there has been no complications.       Patient Care Team:  Alin Marx DO as PCP - General (Family Medicine)  MD Nicky Herring MD    Review of Systems   Constitutional:  Negative for chills, fever and unexpected weight change.        Still having night sweats intermittently   HENT:  Positive for congestion, hearing loss, postnasal drip and rhinorrhea. Negative for ear discharge, ear pain, sinus pressure, sinus pain and sore throat.    Eyes:  Positive for visual disturbance (wears glasses).   Respiratory:  Negative for cough, chest tightness and shortness of breath.    Cardiovascular:  Negative for chest pain and palpitations.   Gastrointestinal:  Positive for diarrhea.  Negative for abdominal pain, constipation, nausea and vomiting.   Genitourinary:  Negative for dysuria and frequency.   Allergic/Immunologic: Positive for environmental allergies.   Neurological:  Negative for dizziness, light-headedness and headaches.   Psychiatric/Behavioral:  Negative for self-injury and suicidal ideas.      Medical History Reviewed by provider this encounter:       Annual Wellness Visit Questionnaire   Enedina is here for her Subsequent Wellness visit.     Health Risk Assessment:   Patient rates overall health as excellent. Patient feels that their physical health rating is same. Patient is very satisfied with their life. Eyesight was rated as slightly worse. Hearing was rated as same. Patient feels that their emotional and mental health rating is same. Patients states they are never, rarely angry. Patient states they are sometimes unusually tired/fatigued. Pain experienced in the last 7 days has been a lot. Patient's pain rating has been 9/10. Patient states that she has experienced no weight loss or gain in last 6 months. I have been experiencing pain in both upper arms.    Depression Screening:   PHQ-9 Score: 1      Fall Risk Screening:   In the past year, patient has experienced: no history of falling in past year      Urinary Incontinence Screening:   Patient has leaked urine accidently in the last six months.     Home Safety:  Patient has trouble with stairs inside or outside of their home. Patient has working smoke alarms and has working carbon monoxide detector. Home safety hazards include: none.     Nutrition:   Current diet is Regular. Vegetarian, eating fish frequently, veggies      Medications:   Patient is currently taking over-the-counter supplements. OTC medications include: see medication list. Patient is able to manage medications.     Activities of Daily Living (ADLs)/Instrumental Activities of Daily Living (IADLs):   Walk and transfer into and out of bed and chair?: Yes  Dress  and groom yourself?: Yes    Bathe or shower yourself?: Yes    Feed yourself? Yes  Do your laundry/housekeeping?: Yes  Manage your money, pay your bills and track your expenses?: Yes  Make your own meals?: Yes    Do your own shopping?: Yes    Previous Hospitalizations:   Any hospitalizations or ED visits within the last 12 months?: No    How many hospitalizations have you had in the last year?: 1-2    Advance Care Planning:   Living will: No    Durable POA for healthcare: Yes    Advanced directive: Yes    Advanced directive counseling given: Yes    ACP document given: Yes      Comments: rosey Hough    Preventive Screenings      Cardiovascular Screening:    General: Screening Not Indicated and History Lipid Disorder      Diabetes Screening:     General: Screening Current      Colorectal Cancer Screening:     General: Screening Current      Breast Cancer Screening:     General: Patient Declines      Cervical Cancer Screening:    General: Screening Not Indicated and Patient Declines      Osteoporosis Screening:    General: Patient Declines      Abdominal Aortic Aneurysm (AAA) Screening:        General: Screening Not Indicated      Lung Cancer Screening:     General: Screening Not Indicated      Hepatitis C Screening:    General: Screening Current    Immunizations:  - Immunizations due: Zoster (Shingrix)    Screening, Brief Intervention, and Referral to Treatment (SBIRT)     Screening  Typical number of drinks in a day: 0  Typical number of drinks in a week: 0  Interpretation: Low risk drinking behavior.    Single Item Drug Screening:  How often have you used an illegal drug (including marijuana) or a prescription medication for non-medical reasons in the past year? never    Single Item Drug Screen Score: 0  Interpretation: Negative screen for possible drug use disorder    Social Drivers of Health     Financial Resource Strain: Low Risk  (11/9/2023)    Overall Financial Resource Strain (REZA)      Difficulty of Paying Living Expenses: Not hard at all   Food Insecurity: No Food Insecurity (6/2/2025)    Nursing - Inadequate Food Risk Classification     Worried About Running Out of Food in the Last Year: Never true     Ran Out of Food in the Last Year: Never true   Transportation Needs: No Transportation Needs (6/2/2025)    PRAPARE - Transportation     Lack of Transportation (Medical): No     Lack of Transportation (Non-Medical): No   Housing Stability: Low Risk  (6/2/2025)    Housing Stability Vital Sign     Unable to Pay for Housing in the Last Year: No     Number of Times Moved in the Last Year: 0     Homeless in the Last Year: No   Utilities: Not At Risk (6/2/2025)    [facility] Utilities     Threatened with loss of utilities: No     No results found.    Objective   There were no vitals taken for this visit.    Physical Exam  Constitutional:       General: She is not in acute distress.     Appearance: Normal appearance. She is obese. She is not ill-appearing, toxic-appearing or diaphoretic.   HENT:      Head: Normocephalic and atraumatic.      Nose: Nose normal. No congestion or rhinorrhea.     Eyes:      General:         Right eye: No discharge.         Left eye: No discharge.       Cardiovascular:      Rate and Rhythm: Normal rate and regular rhythm.      Heart sounds: Normal heart sounds. No murmur heard.     No friction rub. No gallop.   Pulmonary:      Effort: Pulmonary effort is normal. No respiratory distress.      Breath sounds: Normal breath sounds. No stridor. No wheezing, rhonchi or rales.   Abdominal:      General: Abdomen is flat. There is distension (mild).      Tenderness: There is no abdominal tenderness.     Skin:     General: Skin is warm.      Capillary Refill: Capillary refill takes less than 2 seconds.     Neurological:      Mental Status: She is alert.         Answers submitted by the patient for this visit:  Annual Physical (Submitted on 6/2/2025)  Diet/Nutrition choices: well balanced diet,  limited junk food, adequate whole grain intake  Exercise choices: walking, 3-4 times a week on average, 30-60 minutes on average  Sleep choices: 4-6 hours of sleep on average  Hearing choices: decreased hearing right ear, decreased hearing left ear, decreased hearing bilateral ears  Vision choices: most recent eye exam < 1 year ago, wears glasses  Dental choices: regular dental visits, brushes teeth twice daily, floss regularly  Do you currently have an OB/GYN provider that you routinely follow with?: No  Menopausal status: postmenopausal  Any history of sexual transmitted disease/infection?: No  Contraception: menopause  Do you have an advance directive/living will?: No  Do you have a durable power of  (POA)?: No

## 2025-06-03 NOTE — ASSESSMENT & PLAN NOTE
Patient's insomnia and fibromyalgia are directly related to each other.  Unfortunately due to the pain and multiple joint issues, she has difficulty falling asleep.  The Valium 5 mg 1/2 to 1 tablet at bedtime helps her to relax and go to sleep, unfortunately does not keep her asleep if she wakes up.  Again we will attempt Celebrex to help with her overall pain and discomfort and hopefully improve her sleep.    Orders:    diazepam (VALIUM) 5 mg tablet; Take 1/2 to 1 tablet at bedtime    celecoxib (CeleBREX) 100 mg capsule; Take 1 capsule (100 mg total) by mouth 2 (two) times a day    CBC and differential; Future    Comprehensive metabolic panel; Future    TSH, 3rd generation with Free T4 reflex; Future

## 2025-06-03 NOTE — ASSESSMENT & PLAN NOTE
Patient is interested in following up with inhalers that she had previously.  The Spiriva seems to be working well for the patient, she is unable to take an inhaled steroid.  Will continue with Spiriva and albuterol as needed.  Patient does not require any refill of albuterol.  Patient has a diagnosis of COPD.  Orders:    tiotropium (Spiriva HandiHaler) 18 mcg inhalation capsule; Place 1 capsule (18 mcg total) into inhaler and inhale daily    Comprehensive metabolic panel; Future

## 2025-06-03 NOTE — ASSESSMENT & PLAN NOTE
Stable.  Patient to continue on Diovan 20 mg daily.  She has been doing very well on this medication since starting it.  She has had no significant complications or kidney dysfunction  Orders:    Comprehensive metabolic panel; Future

## 2025-06-03 NOTE — ASSESSMENT & PLAN NOTE
Follows with GI.  She has been monitoring her oral intake and avoiding medications that may make it flare.  Nexium 40 mg also helps to reduce risk of issues and pain.  The cholestyramine has also helped her to avoid excessive diarrhea.  This is through her GI

## 2025-06-03 NOTE — ASSESSMENT & PLAN NOTE
Patient has chronic pain associated with arthritis, DDD of the lumbar region, and has had multiple falls.  Patient is interested in following up with physical therapy to improve strength, mobility, and work on functional assessment/risk.  Patient's symptoms of severe pain in multiple joints should be addressed, unfortunately she has attempted several different medications in the past.  She does not believe that she is attempted Celebrex, kidney function is within normal limits.  Will continue to have the patient monitor her pain while taking Celebrex 100 mg twice a day  Orders:    Ambulatory Referral to Physical Therapy; Future    celecoxib (CeleBREX) 100 mg capsule; Take 1 capsule (100 mg total) by mouth 2 (two) times a day    Comprehensive metabolic panel; Future    Vitamin D 25 hydroxy; Future

## 2025-06-03 NOTE — PATIENT INSTRUCTIONS

## 2025-07-09 ENCOUNTER — HOSPITAL ENCOUNTER (OUTPATIENT)
Dept: RADIOLOGY | Facility: HOSPITAL | Age: 75
Discharge: HOME | End: 2025-07-09
Payer: COMMERCIAL

## 2025-07-09 ENCOUNTER — HOSPITAL ENCOUNTER (OUTPATIENT)
Dept: NON INVASIVE DIAGNOSTICS | Facility: CLINIC | Age: 75
Discharge: HOME | End: 2025-07-09
Attending: PHYSICIAN ASSISTANT
Payer: COMMERCIAL

## 2025-07-09 DIAGNOSIS — M43.16 LUMBAR ADJACENT SEGMENT DISEASE WITH SPONDYLOLISTHESIS: ICD-10-CM

## 2025-07-09 DIAGNOSIS — M51.16 LUMBAR DISC DISEASE WITH RADICULOPATHY: ICD-10-CM

## 2025-07-09 DIAGNOSIS — M51.369 LUMBAR ADJACENT SEGMENT DISEASE WITH SPONDYLOLISTHESIS: ICD-10-CM

## 2025-07-09 DIAGNOSIS — M54.12 RADICULOPATHY, CERVICAL REGION: ICD-10-CM

## 2025-07-09 DIAGNOSIS — I73.9 PAD (PERIPHERAL ARTERY DISEASE) (HCC): ICD-10-CM

## 2025-07-09 PROCEDURE — 93923 UPR/LXTR ART STDY 3+ LVLS: CPT | Performed by: SURGERY

## 2025-07-09 PROCEDURE — 72110 X-RAY EXAM L-2 SPINE 4/>VWS: CPT

## 2025-07-09 PROCEDURE — 93925 LOWER EXTREMITY STUDY: CPT

## 2025-07-09 PROCEDURE — 93925 LOWER EXTREMITY STUDY: CPT | Performed by: SURGERY

## 2025-07-09 PROCEDURE — 93923 UPR/LXTR ART STDY 3+ LVLS: CPT

## 2025-07-10 ENCOUNTER — TELEPHONE (OUTPATIENT)
Age: 75
End: 2025-07-10

## 2025-07-10 ENCOUNTER — TELEPHONE (OUTPATIENT)
Dept: VASCULAR SURGERY | Facility: CLINIC | Age: 75
End: 2025-07-10

## 2025-07-10 DIAGNOSIS — F41.9 ANXIETY: Primary | ICD-10-CM

## 2025-07-10 NOTE — TELEPHONE ENCOUNTER
Attempted to contact patient to schedule appointment(s) listed below.  Requested patient call [phone] to schedule appointment(s).    Patient's appointment(s) are due on or after 9/2025.    Dopplers  [] Abdominal Aorta Iliac (AOIL)  [] Carotid (CV)   [] Celiac and/or Mesenteric  [] Endovascular Aortic Repair (EVAR)   [] Hemodialysis Access (HD)   [x] Lower Limb Arterial (CASH)  [] Lower Limb Venous (LEV)  [] Lower Limb Venous Duplex with Reflux (LEVDR)  [] Renal Artery  [] Upper Limb Arterial (UEA)    [] Upper Limb Venous (UEV)              [] ELBA and Waveform analysis     Advanced Imaging   [] CTA head/neck    [] CTA abdomen    [] CTA abdomen & pelvis    [] CT abdomen with/ without contrast  [] CT abdomen with contrast  [] CT abdomen without contrast    [] CT abdomen & pelvis with/ without contrast  [] CT abdomen & pelvis with contrast  [] CT abdomen & pelvis without contrast    Office Visit   [] New patient, patient last seen over 3 years ago  [x] Risk factor modification (RFM)   [x] Follow up   [] Lost to follow up (LTFU)   Called patient & LMOM to schedule 1 yr rfm ov fu/RR CASH 7/9/25

## 2025-07-10 NOTE — TELEPHONE ENCOUNTER
S/w pt and advised of same. Pt asked if she went to an open MRI would we be able to have her ref sent? RN advised wherever the pt feels more comfortable we will gladly fax the ref. Pt verbalized understanding and apprec of call.

## 2025-07-10 NOTE — TELEPHONE ENCOUNTER
Patient is already on a benzodiazepine  May take her Valium prior to the MRI  MRI should take roughly 30 to 35 minutes  Thank you

## 2025-07-10 NOTE — TELEPHONE ENCOUNTER
Patient returning  a call to [facility] 1 yr rfm ov fu/RR CASH 7/9/25  L/S 09/09/24 RD -[facility], she only wants to see L[facility] in [facility], added patient to waitlist.

## 2025-07-10 NOTE — TELEPHONE ENCOUNTER
Caller: Patient     Doctor: Dr. Tinajero      Reason for call: Patient is claustrophobic and wanting to see if she can have something to help take the edge off prior to or have MRI with sedation?     Would also like to see how long the MRI would last?     Please assist        Call back#: [phone]

## 2025-07-11 ENCOUNTER — RESULTS FOLLOW-UP (OUTPATIENT)
Dept: VASCULAR SURGERY | Facility: CLINIC | Age: 75
End: 2025-07-11

## 2025-07-21 ENCOUNTER — TRANSCRIBE ORDERS (OUTPATIENT)
Dept: VASCULAR SURGERY | Facility: CLINIC | Age: 75
End: 2025-07-21

## 2025-07-21 DIAGNOSIS — I73.9 PAD (PERIPHERAL ARTERY DISEASE) (HCC): Primary | ICD-10-CM

## (undated) DEVICE — FLEXCIL HIGH PRESSURE CONTRAST INJECTION LINE: Brand: NAMIC

## (undated) DEVICE — DRESSING GUAZE ADH BORDER 4 X 4 IN

## (undated) DEVICE — Device

## (undated) DEVICE — COVER PROBE INTRAOPERATIVE 6 X 96 IN

## (undated) DEVICE — GUIDEWIRE VASC .035 260CM 15CM TAP ST

## (undated) DEVICE — GLOVE SRG BIOGEL ECLIPSE 6

## (undated) DEVICE — SYRINGE KIT,PACKAGED,,150FT,MK 7(ANGIO-ARTERION, 150ML SYR KIT W/QFT,MC)(60729385): Brand: MEDRAD® MARK 7 ARTERION DISPOSABLE SYRINGE 150 ML WITH QUICK FILL TUBE

## (undated) DEVICE — PINNACLE R/O II INTRODUCER SHEATH WITH RADIOPAQUE MARKER: Brand: PINNACLE

## (undated) DEVICE — DESTINATION PERIPHERAL GUIDING SHEATH: Brand: DESTINATION

## (undated) DEVICE — RADIFOCUS GLIDEWIRE: Brand: GLIDEWIRE

## (undated) DEVICE — FLUID MANAGEMENT KIT - IR

## (undated) DEVICE — STERILE ICS CARDIOVASCULAR PK: Brand: CARDINAL HEALTH

## (undated) DEVICE — PERCUTANEOUS ENTRY THINWALL NEEDLE  ONE-PART: Brand: COOK

## (undated) DEVICE — CATH DIAG 5FR .035 65CM 6S OMMI-FLUSH

## (undated) DEVICE — CATH TEMPO AQUA 4FVER135Â°100CM: Brand: TEMPO AQUA

## (undated) DEVICE — SNAP KOVER: Brand: UNBRANDED

## (undated) DEVICE — 3M™ TEGADERM™ TRANSPARENT FILM DRESSING FRAME STYLE, 1626W, 4 IN X 4-3/4 IN (10 CM X 12 CM), 50/CT 4CT/CASE: Brand: 3M™ TEGADERM™